# Patient Record
Sex: MALE | Race: WHITE | Employment: OTHER | URBAN - METROPOLITAN AREA
[De-identification: names, ages, dates, MRNs, and addresses within clinical notes are randomized per-mention and may not be internally consistent; named-entity substitution may affect disease eponyms.]

---

## 2017-12-02 ENCOUNTER — HOSPITAL ENCOUNTER (OUTPATIENT)
Age: 62
Setting detail: OBSERVATION
Discharge: HOME | End: 2017-12-03
Attending: EMERGENCY MEDICINE | Admitting: SURGERY
Payer: COMMERCIAL

## 2017-12-02 ENCOUNTER — APPOINTMENT (OUTPATIENT)
Dept: GENERAL RADIOLOGY | Age: 62
End: 2017-12-02
Payer: COMMERCIAL

## 2017-12-02 ENCOUNTER — APPOINTMENT (OUTPATIENT)
Dept: CT IMAGING | Age: 62
End: 2017-12-02
Payer: COMMERCIAL

## 2017-12-02 DIAGNOSIS — S01.81XA FACIAL LACERATION, INITIAL ENCOUNTER: ICD-10-CM

## 2017-12-02 DIAGNOSIS — F10.920 ACUTE ALCOHOLIC INTOXICATION WITHOUT COMPLICATION (HCC): Primary | ICD-10-CM

## 2017-12-02 PROBLEM — F10.220 ALCOHOL INTOXICATION IN ALCOHOLISM WITH BLOOD LEVEL OVER 0.3 WITHOUT COMPLICATION (HCC): Status: ACTIVE | Noted: 2017-12-02

## 2017-12-02 LAB
ETHANOL PERCENT: 0.34 %
ETHANOL: 344 MG/DL

## 2017-12-02 PROCEDURE — 71020 XR CHEST STANDARD TWO VW: CPT

## 2017-12-02 PROCEDURE — 99285 EMERGENCY DEPT VISIT HI MDM: CPT

## 2017-12-02 PROCEDURE — 12011 RPR F/E/E/N/L/M 2.5 CM/<: CPT

## 2017-12-02 PROCEDURE — G0378 HOSPITAL OBSERVATION PER HR: HCPCS

## 2017-12-02 PROCEDURE — 72125 CT NECK SPINE W/O DYE: CPT

## 2017-12-02 PROCEDURE — G0480 DRUG TEST DEF 1-7 CLASSES: HCPCS

## 2017-12-02 PROCEDURE — 36415 COLL VENOUS BLD VENIPUNCTURE: CPT

## 2017-12-02 PROCEDURE — 90471 IMMUNIZATION ADMIN: CPT | Performed by: EMERGENCY MEDICINE

## 2017-12-02 PROCEDURE — 90715 TDAP VACCINE 7 YRS/> IM: CPT | Performed by: EMERGENCY MEDICINE

## 2017-12-02 PROCEDURE — 2500000003 HC RX 250 WO HCPCS: Performed by: EMERGENCY MEDICINE

## 2017-12-02 PROCEDURE — 70450 CT HEAD/BRAIN W/O DYE: CPT

## 2017-12-02 PROCEDURE — 70486 CT MAXILLOFACIAL W/O DYE: CPT

## 2017-12-02 PROCEDURE — 6360000002 HC RX W HCPCS: Performed by: EMERGENCY MEDICINE

## 2017-12-02 RX ORDER — SODIUM CHLORIDE 0.9 % (FLUSH) 0.9 %
10 SYRINGE (ML) INJECTION EVERY 12 HOURS SCHEDULED
Status: DISCONTINUED | OUTPATIENT
Start: 2017-12-02 | End: 2017-12-03 | Stop reason: HOSPADM

## 2017-12-02 RX ORDER — METOPROLOL SUCCINATE 50 MG/1
200 TABLET, EXTENDED RELEASE ORAL DAILY
COMMUNITY

## 2017-12-02 RX ORDER — ACETAMINOPHEN 325 MG/1
650 TABLET ORAL EVERY 4 HOURS PRN
Status: DISCONTINUED | OUTPATIENT
Start: 2017-12-02 | End: 2017-12-03 | Stop reason: HOSPADM

## 2017-12-02 RX ORDER — LIDOCAINE HYDROCHLORIDE 10 MG/ML
20 INJECTION, SOLUTION INFILTRATION; PERINEURAL ONCE
Status: COMPLETED | OUTPATIENT
Start: 2017-12-02 | End: 2017-12-02

## 2017-12-02 RX ORDER — SODIUM CHLORIDE 0.9 % (FLUSH) 0.9 %
10 SYRINGE (ML) INJECTION PRN
Status: DISCONTINUED | OUTPATIENT
Start: 2017-12-02 | End: 2017-12-03 | Stop reason: HOSPADM

## 2017-12-02 RX ADMIN — TETANUS TOXOID, REDUCED DIPHTHERIA TOXOID AND ACELLULAR PERTUSSIS VACCINE, ADSORBED 0.5 ML: 5; 2.5; 8; 8; 2.5 SUSPENSION INTRAMUSCULAR at 20:53

## 2017-12-02 RX ADMIN — LIDOCAINE HYDROCHLORIDE 20 ML: 10 INJECTION, SOLUTION INFILTRATION; PERINEURAL at 19:42

## 2017-12-02 ASSESSMENT — ENCOUNTER SYMPTOMS
ABDOMINAL PAIN: 0
COUGH: 0
NAUSEA: 0
SHORTNESS OF BREATH: 0
SORE THROAT: 0
VOMITING: 0

## 2017-12-02 ASSESSMENT — PAIN SCALES - GENERAL
PAINLEVEL_OUTOF10: 6
PAINLEVEL_OUTOF10: 0

## 2017-12-02 NOTE — ED PROVIDER NOTES
[facility]  eMERGENCY dEPARTMENT eNCOUnter   Attending Attestation     Pt Name: Flavio Ngo  MRN: [id number]  [place]frhonda 1955  Date of evaluation: 12/2/17       Flavio Ngo is a 65 y.o. male who presents with Fall (head lac)      History:   Trip and fall, prior to arrival striking his head, complains of intermittent episodes of generalized weakness, not lose consciousness. Only complaining of laceration to the forehead, denies any neck or back pain. Social History     Tobacco History     Smoking Status  Never Smoker    Smokeless Tobacco Use  Never Used          Alcohol History     Alcohol Use Status  Yes Drinks/Week  6 Cans of beer per week Amount  3.6 oz alcohol/wk Comment  pt states he drinks daily          Drug Use     Drug Use Status  No          Sexual Activity     Sexually Active  Not Asked                No current facility-administered medications for this encounter. Current Outpatient Prescriptions   Medication Sig Dispense Refill    metoprolol succinate (TOPROL XL) 50 MG extended release tablet Take 200 mg by mouth daily          Past Medical History:   Diagnosis Date    Hypertension        History reviewed. No pertinent surgical history. Exam: Vitals:   Vitals:    12/02/17 2031 12/02/17 2112 12/03/17 0046 12/03/17 0711   BP: (!) 160/92 (!) 182/83 133/84 (!) 156/82   Pulse: 57 57 61 71   Resp: 18 16 16 20   Temp: 98.3 °F (36.8 °C) 97.9 °F (36.6 °C) 98.4 °F (36.9 °C) 99.1 °F (37.3 °C)   TempSrc: Oral Oral Oral Oral   SpO2: 96% 98% 95% 96%   Weight:       Height:         Plan:  CT head neck, chest x-ray. There is some alcohol use although says is much earlier this morning, but there is concern for alcoholism based on frequent falls, per wife patient is acting at baseline. CT scan of face neck cervical spine are nondiagnostic, no evidence of fracture. Chest x-ray shows no acute process.     Alcohol level at 344, decision to admit in due to mechanism and age, recommend admission for

## 2017-12-02 NOTE — ED PROVIDER NOTES
Start Date End Date Taking? Authorizing Provider   cephALEXin (KEFLEX) 500 MG capsule Take 1 capsule by mouth 4 times daily for 7 days 12/3/17 12/10/17 Yes Terrell Ryan MD   metoprolol succinate (TOPROL XL) 50 MG extended release tablet Take 200 mg by mouth daily    Yes Historical Provider, MD       REVIEW OF SYSTEMS    (2-9 systems for level 4, 10 or more for level 5)      Review of Systems   Constitutional: Negative for chills and fever. HENT: Negative for sore throat. Eyes: Negative for visual disturbance. Respiratory: Negative for cough and shortness of breath. Cardiovascular: Negative for chest pain. Gastrointestinal: Negative for abdominal pain, nausea and vomiting. Genitourinary: Negative for difficulty urinating. Musculoskeletal: Negative for arthralgias and myalgias. Skin: Positive for wound. Neurological: Negative for weakness, numbness and headaches. Psychiatric/Behavioral: Negative for behavioral problems. PHYSICAL EXAM   (up to 7 for level 4, 8 or more for level 5)      INITIAL VITALS:   BP (!) 156/82   Pulse 71   Temp 99.1 °F (37.3 °C) (Oral)   Resp 20   Ht 5' 8\" (1.727 m)   Wt 180 lb (81.6 kg)   SpO2 96%   BMI 27.37 kg/m²     Physical Exam   Constitutional: He is oriented to person, place, and time. He appears well-developed and well-nourished. No distress. HENT:   Head: Normocephalic. Mouth/Throat: Oropharynx is clear and moist. No oropharyngeal exudate. Eyes: EOM are normal. Pupils are equal, round, and reactive to light. Neck: Normal range of motion. No midline tenderness palpation of the C-spine; no step-offs or deformities   Cardiovascular: Normal rate and regular rhythm. Pulmonary/Chest: Effort normal. He has no wheezes. He has no rales. Abdominal: Soft. There is no tenderness. There is no rebound and no guarding. Musculoskeletal: Normal range of motion.    No midline tenderness palpation of the thoracic or lumbar spine; no step-offs or deformities felt; 5 out of 5 strength of upper and lower extremities; no gross sensory deficits; strong distal pulses with good cap refill   Neurological: He is alert and oriented to person, place, and time. He has normal strength. No cranial nerve deficit or sensory deficit. GCS eye subscore is 4. GCS verbal subscore is 5. GCS motor subscore is 6. Skin:   Soft tissue swelling with surrounding ecchymosis above the left eyebrow with associated 2 cm linear laceration   Psychiatric: His behavior is normal.       DIFFERENTIAL  DIAGNOSIS     PLAN (LABS / IMAGING / EKG):  Orders Placed This Encounter   Procedures    CT Head WO Contrast    CT Facial Bones WO Contrast    CT CERVICAL SPINE WO CONTRAST    XR CHEST STANDARD (2 VW)    Ethanol    Cervical collar    Notify physician    Inpatient consult to Trauma Surgery    Inpatient consult to Trauma Surgery    Insert peripheral IV    PATIENT STATUS (DIRECT) Observation    PATIENT STATUS (FROM ED OR OR/PROCEDURAL) Observation    Discharge patient       MEDICATIONS ORDERED:  Orders Placed This Encounter   Medications    lidocaine 1 % injection 20 mL    DISCONTD: sodium chloride flush 0.9 % injection 10 mL    DISCONTD: sodium chloride flush 0.9 % injection 10 mL    DISCONTD: acetaminophen (TYLENOL) tablet 650 mg    Tetanus-Diphth-Acell Pertussis (BOOSTRIX) injection 0.5 mL    cephALEXin (KEFLEX) 500 MG capsule     Sig: Take 1 capsule by mouth 4 times daily for 7 days     Dispense:  28 capsule     Refill:  0       DIAGNOSTIC RESULTS / EMERGENCY DEPARTMENT COURSE / MDM     LABS:  Results for orders placed or performed during the hospital encounter of 12/02/17   Ethanol   Result Value Ref Range    Ethanol 344 (HH) <10 mg/dL    Ethanol percent 0.344 %       IMPRESSION: Patient presents with mechanical fall. States that he landed on his hands and face. He does appear clinically intoxicated even though he states that he only had 'a few drinks' this morning.  Denies any loss of consciousness. Patient is afebrile and hemodynamically stable. He is healthy and nontoxic in appearance. He is neurologically intact. He has no gross motor or sensory deficits. Cranial nerves are intact. He does have some soft tissue swelling to the left eyebrow with surrounding ecchymosis as well as a 2 centimeter laceration that is well approximated. Given his presentation and his age, we'll plan to obtain a CT of his head, neck and face to rule out any acute osseous injury. We'll plan to irrigate his wound and close it with sutures as well. RADIOLOGY:  Xr Chest Standard (2 Vw)    Result Date: 12/2/2017  EXAMINATION: TWO VIEWS OF THE CHEST 12/2/2017 6:31 pm COMPARISON: None. HISTORY: ORDERING SYSTEM PROVIDED HISTORY: fall, mechanism TECHNOLOGIST PROVIDED HISTORY: Reason for exam:->fall, mechanism Ordering Physician Provided Reason for Exam: fall Acuity: Acute Type of Exam: Initial FINDINGS: Cardiomediastinal contour is within normal limits. No focal consolidation. No significant pleural effusion. 1. No acute cardiopulmonary disease. Ct Head Wo Contrast    Result Date: 12/2/2017  EXAMINATION: CT OF THE HEAD WITHOUT CONTRAST  12/2/2017 6:12 pm TECHNIQUE: CT of the head was performed without the administration of intravenous contrast. Dose modulation, iterative reconstruction, and/or weight based adjustment of the mA/kV was utilized to reduce the radiation dose to as low as reasonably achievable. COMPARISON: None. HISTORY: ORDERING SYSTEM PROVIDED HISTORY: fall TECHNOLOGIST PROVIDED HISTORY: Ordering Physician Provided Reason for Exam: PATIENT STATES HE FELL TODAY, LACERATION TO LEFT EYE Acuity: Acute Type of Exam: Initial FINDINGS: BRAIN/VENTRICLES: There is prominence of the ventricles and cortical sulci representing cerebral atrophy. No midline shift. Basal cisterns are normally outlined. There is no evidence for acute infarct. No acute intra or extra-axial hemorrhage.  ORBITS: The 12/2/2017  EXAMINATION: CT OF THE CERVICAL SPINE WITHOUT CONTRAST 12/2/2017 6:12 pm TECHNIQUE: CT of the cervical spine was performed without the administration of intravenous contrast. Multiplanar reformatted images are provided for review. Dose modulation, iterative reconstruction, and/or weight based adjustment of the mA/kV was utilized to reduce the radiation dose to as low as reasonably achievable. COMPARISON: None. HISTORY: ORDERING SYSTEM PROVIDED HISTORY: fall TECHNOLOGIST PROVIDED HISTORY: Ordering Physician Provided Reason for Exam: PATIENT STATES HE FELL TODAY, LACERATION TO LEFT EYE Acuity: Acute Type of Exam: Initial FINDINGS: BONES/ALIGNMENT: No subluxation or dislocation. Straightening of the cervical spine may be due to muscle spasm or positioning. No acute fracture. DEGENERATIVE CHANGES: Multilevel mild disc space narrowing with vertebral body osteophytes. SOFT TISSUES: There is no prevertebral soft tissue swelling. No acute abnormality of the cervical spine. Mild degenerative changes. EKG  None    All EKG's are interpreted by the Emergency Department Physician who either signs or Co-signs this chart in the absence of a cardiologist.    EMERGENCY DEPARTMENT COURSE:  Patient was updated on lab results and imaging studies. Discussed that his alcohol level was over 300. When this value was seen, a cervical collar was placed around the patient's neck. Discussed that his imaging studies were negative for any acute osseous injury. Discussed that given his presentation, we would like to have him admitted to the trauma service for reevaluation at a sober time make sure he doesn't have any cervical injury. Patient is agreeable with the plan and stable for admission. Awaiting transfer to the floor. Patient tolerated laceration repair without difficulty. PROCEDURES:  Laceration Repair Procedure Note    Indication: Laceration    Procedure:  The patient was placed in the appropriate position and

## 2017-12-03 VITALS
HEART RATE: 71 BPM | WEIGHT: 180 LBS | HEIGHT: 68 IN | DIASTOLIC BLOOD PRESSURE: 82 MMHG | RESPIRATION RATE: 20 BRPM | TEMPERATURE: 99.1 F | OXYGEN SATURATION: 96 % | BODY MASS INDEX: 27.28 KG/M2 | SYSTOLIC BLOOD PRESSURE: 156 MMHG
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
## 2017-12-03 PROCEDURE — 2580000003 HC RX 258: Performed by: SURGERY

## 2017-12-03 PROCEDURE — G0378 HOSPITAL OBSERVATION PER HR: HCPCS

## 2017-12-03 RX ORDER — CEPHALEXIN 500 MG/1
500 CAPSULE ORAL 4 TIMES DAILY
Qty: 28 CAPSULE | Refills: 0 | Status: SHIPPED | OUTPATIENT
Start: 2017-12-03 | End: 2017-12-10

## 2017-12-03 RX ADMIN — Medication 10 ML: at 08:22

## 2017-12-03 NOTE — ED NOTES
Report called to floor, received by Sapna Butler. Report pt laceration on left eyebrow was cleaned and had 5 stitches put in and bacitracin and gauze applied. Abrasion on left cheek and right thumb cleaned and bacitracin applied. Report pt ETOH 344, aspen collar applied to pt. Pt used urinal and output was 400 mL. Pt family at bedside. RN verbalized understanding.      Laurie Condon RN  12/02/17 [id number]

## 2017-12-03 NOTE — H&P
General Surgery Consult      Pt Name: Flavia Zambrano  MRN: [id number]  YOB: 1955  Date of evaluation: 12/3/2017  Primary Care Physician: Derrick Sinclair   Patient evaluated at the request of  Dr. Donnell Veras  Reason for evaluation: History of fall    SUBJECTIVE:   History of Chief Complaint:    Flavia Zambrano is a 65 y.o. male who presents with History of fall patient without intoxication patient was walking and fell hitting left side of his face. Patient had a small laceration above the left eye that was sutured in the emergency room. Patient denied any loss of consciousness. When he was interviewed today patient denied any significant complaints. Was tolerating diet no chest pain no abdominal pain some swelling around the left thigh but invasion was not affected. Patient wanted to go home. He was able to ambulate without any difficulty. Patient had workup in the emergency room including CT scan of the head and neck and facial bones which was essentially negative. Some left periorbital swelling noted. Patient's I'll call level was quite high and he was admitted for overnight observation. . Symptom onset has been acute for a time period of few hour(s). Severity is described as mild-moderate. Course of his symptoms over time is acute. Past Medical History   has a past medical history of Hypertension. Past Surgical History   has no past surgical history on file. Medications  Prior to Admission medications    Medication Sig Start Date End Date Taking? Authorizing Provider   cephALEXin (KEFLEX) 500 MG capsule Take 1 capsule by mouth 4 times daily for 7 days 12/3/17 12/10/17 Yes Song Guy MD   metoprolol succinate (TOPROL XL) 50 MG extended release tablet Take 200 mg by mouth daily    Yes Historical Provider, MD     Allergies  has No Known Allergies. Family History  family history is not on file. Social History   reports that he has never smoked.  He has never used smokeless tobacco. He reports that he deficits. CN II-XII are grossly intact. EXTREMITIES: no cyanosis, no clubbing and no edema    LABS:   CBC with Differential:  No results found for: WBC, RBC, HGB, HCT, PLT, MCV, MCH, MCHC, RDW, NRBC, SEGSPCT, BANDSPCT, BLASTSPCT, METASPCT, LYMPHOPCT, PROMYELOPCT, MONOPCT, MYELOPCT, EOSPCT, BASOPCT, ATYLYMREL, MONOSABS, LYMPHSABS, EOSABS, BASOSABS, DIFFTYPE  BMP:  No results found for: NA, K, CL, CO2, BUN, LABALBU, CREATININE, CALCIUM, GFRAA, LABGLOM, GLUCOSE  Hepatic Function Panel:  No results found for: ALKPHOS, ALT, AST, PROT, BILITOT, BILIDIR, IBILI, LABALBU  Calcium:  No results found for: CALCIUM  Magnesium:  No results found for: MG  Phosphorus:  No results found for: PHOS  PT/INR:  No results found for: PROTIME, INR  ABG:  No results found for: PHART, PH, SMR7MVF, PCO2, PO2ART, PO2, NRL4HVZ, HCO3, BEART, BE, THGBART, THB, CBC4PDT, C4HJKEYQ, O2SAT  Urine Culture:  No components found for: CURINE  Blood Culture:  No components found for: CBLOOD, CFUNGUSBL  Stool Culture:  No components found for: CSTOOL    RADIOLOGY:   I have personally reviewed the following films:  Xr Chest Standard (2 Vw)    Result Date: 12/2/2017  EXAMINATION: TWO VIEWS OF THE CHEST 12/2/2017 6:31 pm COMPARISON: None. HISTORY: ORDERING SYSTEM PROVIDED HISTORY: fall, mechanism TECHNOLOGIST PROVIDED HISTORY: Reason for exam:->fall, mechanism Ordering Physician Provided Reason for Exam: fall Acuity: Acute Type of Exam: Initial FINDINGS: Cardiomediastinal contour is within normal limits. No focal consolidation. No significant pleural effusion. 1. No acute cardiopulmonary disease.      Ct Head Wo Contrast    Result Date: 12/2/2017  EXAMINATION: CT OF THE HEAD WITHOUT CONTRAST  12/2/2017 6:12 pm TECHNIQUE: CT of the head was performed without the administration of intravenous contrast. Dose modulation, iterative reconstruction, and/or weight based adjustment of the mA/kV was utilized to reduce the radiation dose to as low as reasonably tissue swelling. SINUSES/MASTOIDS:  Quite prominent mucosal thickening of the maxillary sinuses left greater than right reflective of chronic sinus inflammatory disease. Mastoid air cells are well aerated. No acute fracture is seen. SOFT TISSUES:  Mild left periorbital soft tissue swelling. No acute traumatic injury of the facial bones. Mild left periorbital soft tissue swelling. Chronic bilateral maxillary sinus inflammatory disease. Ct Cervical Spine Wo Contrast    Result Date: 12/2/2017  EXAMINATION: CT OF THE CERVICAL SPINE WITHOUT CONTRAST 12/2/2017 6:12 pm TECHNIQUE: CT of the cervical spine was performed without the administration of intravenous contrast. Multiplanar reformatted images are provided for review. Dose modulation, iterative reconstruction, and/or weight based adjustment of the mA/kV was utilized to reduce the radiation dose to as low as reasonably achievable. COMPARISON: None. HISTORY: ORDERING SYSTEM PROVIDED HISTORY: fall TECHNOLOGIST PROVIDED HISTORY: Ordering Physician Provided Reason for Exam: PATIENT STATES HE FELL TODAY, LACERATION TO LEFT EYE Acuity: Acute Type of Exam: Initial FINDINGS: BONES/ALIGNMENT: No subluxation or dislocation. Straightening of the cervical spine may be due to muscle spasm or positioning. No acute fracture. DEGENERATIVE CHANGES: Multilevel mild disc space narrowing with vertebral body osteophytes. SOFT TISSUES: There is no prevertebral soft tissue swelling. No acute abnormality of the cervical spine. Mild degenerative changes. IMPRESSION:   1. History of EtOH intoxication with history of fall. Small laceration above the left eyebrow with some left periorbital swelling. No evidence of acute fracture. Negative CT scan of the head and neck and the facial bones. does not have any pertinent problems on file. PLAN:   1. No acute general surgical issues. C-collar was discontinued.  No evidence of radiologic or clinical evidence of C-spine trauma. patient will be discharged home today. Patient will follow-up with his family physician towards the end of this coming week and those sutures can be removed and his family [de-identified] office. Follow-up with me as needed. Thank you for this interesting consult and for allowing us to participate in the care of this patient. If you have any questions please don't hesitate to call.           Electronically signed by Kay Qiunones MD  on 12/3/2017 at 6:36 PM
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
## 2022-01-01 ENCOUNTER — APPOINTMENT (OUTPATIENT)
Dept: GENERAL RADIOLOGY | Age: 67
DRG: 377 | End: 2022-01-01
Payer: COMMERCIAL

## 2022-01-01 ENCOUNTER — HOSPITAL ENCOUNTER (INPATIENT)
Age: 67
LOS: 4 days | DRG: 377 | End: 2022-04-01
Attending: EMERGENCY MEDICINE
Payer: COMMERCIAL

## 2022-01-01 ENCOUNTER — APPOINTMENT (OUTPATIENT)
Dept: ULTRASOUND IMAGING | Age: 67
DRG: 377 | End: 2022-01-01
Payer: COMMERCIAL

## 2022-01-01 ENCOUNTER — APPOINTMENT (OUTPATIENT)
Dept: MRI IMAGING | Age: 67
DRG: 377 | End: 2022-01-01
Payer: COMMERCIAL

## 2022-01-01 ENCOUNTER — APPOINTMENT (OUTPATIENT)
Dept: CT IMAGING | Age: 67
DRG: 377 | End: 2022-01-01
Payer: COMMERCIAL

## 2022-01-01 VITALS
TEMPERATURE: 97.5 F | RESPIRATION RATE: 34 BRPM | SYSTOLIC BLOOD PRESSURE: 88 MMHG | HEIGHT: 69 IN | OXYGEN SATURATION: 79 % | BODY MASS INDEX: 38.04 KG/M2 | WEIGHT: 256.84 LBS | DIASTOLIC BLOOD PRESSURE: 54 MMHG | HEART RATE: 128 BPM

## 2022-01-01 DIAGNOSIS — I46.9 CARDIAC ARREST (HCC): Primary | ICD-10-CM

## 2022-01-01 LAB
-: ABNORMAL
ABO/RH: NORMAL
ABSOLUTE EOS #: 0 K/UL (ref 0–0.4)
ABSOLUTE EOS #: 0.13 K/UL (ref 0–0.4)
ABSOLUTE EOS #: 0.19 K/UL (ref 0–0.4)
ABSOLUTE EOS #: <0.03 K/UL (ref 0–0.44)
ABSOLUTE IMMATURE GRANULOCYTE: 0 K/UL (ref 0–0.3)
ABSOLUTE IMMATURE GRANULOCYTE: 0 K/UL (ref 0–0.3)
ABSOLUTE IMMATURE GRANULOCYTE: 0.04 K/UL (ref 0–0.3)
ABSOLUTE IMMATURE GRANULOCYTE: 0.51 K/UL (ref 0–0.3)
ABSOLUTE LYMPH #: 1.03 K/UL (ref 1.1–3.7)
ABSOLUTE LYMPH #: 1.41 K/UL (ref 1–4.8)
ABSOLUTE LYMPH #: 1.51 K/UL (ref 1–4.8)
ABSOLUTE LYMPH #: 4.48 K/UL (ref 1–4.8)
ABSOLUTE MONO #: 0.86 K/UL (ref 0.1–1.2)
ABSOLUTE MONO #: 0.9 K/UL (ref 0.1–0.8)
ABSOLUTE MONO #: 1.03 K/UL (ref 0.1–0.8)
ABSOLUTE MONO #: 1.06 K/UL (ref 0.1–0.8)
ACETAMINOPHEN LEVEL: <5 UG/ML (ref 10–30)
ALBUMIN FLUID: 0.8 G/DL
ALBUMIN SERPL-MCNC: 2.1 G/DL (ref 3.5–5.2)
ALBUMIN SERPL-MCNC: 2.3 G/DL (ref 3.5–5.2)
ALBUMIN SERPL-MCNC: 2.5 G/DL (ref 3.5–5.2)
ALBUMIN SERPL-MCNC: 2.5 G/DL (ref 3.5–5.2)
ALBUMIN SERPL-MCNC: 2.6 G/DL (ref 3.5–5.2)
ALBUMIN/GLOBULIN RATIO: 0.7 (ref 1–2.5)
ALBUMIN/GLOBULIN RATIO: 0.8 (ref 1–2.5)
ALLEN TEST: ABNORMAL
ALP BLD-CCNC: 101 U/L (ref 40–129)
ALP BLD-CCNC: 58 U/L (ref 40–129)
ALP BLD-CCNC: 63 U/L (ref 40–129)
ALP BLD-CCNC: 82 U/L (ref 40–129)
ALP BLD-CCNC: 99 U/L (ref 40–129)
ALT SERPL-CCNC: 16 U/L (ref 5–41)
ALT SERPL-CCNC: 33 U/L (ref 5–41)
ALT SERPL-CCNC: 35 U/L (ref 5–41)
ALT SERPL-CCNC: 37 U/L (ref 5–41)
ALT SERPL-CCNC: 41 U/L (ref 5–41)
AMMONIA: 64 UMOL/L (ref 16–60)
AMMONIA: 65 UMOL/L (ref 16–60)
ANION GAP SERPL CALCULATED.3IONS-SCNC: 10 MMOL/L (ref 9–17)
ANION GAP SERPL CALCULATED.3IONS-SCNC: 13 MMOL/L (ref 9–17)
ANION GAP SERPL CALCULATED.3IONS-SCNC: 13 MMOL/L (ref 9–17)
ANION GAP SERPL CALCULATED.3IONS-SCNC: 14 MMOL/L (ref 9–17)
ANION GAP SERPL CALCULATED.3IONS-SCNC: 16 MMOL/L (ref 9–17)
ANION GAP SERPL CALCULATED.3IONS-SCNC: 16 MMOL/L (ref 9–17)
ANION GAP SERPL CALCULATED.3IONS-SCNC: 19 MMOL/L (ref 9–17)
ANION GAP SERPL CALCULATED.3IONS-SCNC: 20 MMOL/L (ref 9–17)
ANION GAP: 17 MMOL/L (ref 7–16)
ANION GAP: 21 MMOL/L (ref 7–16)
ANTIBODY SCREEN: NEGATIVE
ARM BAND NUMBER: NORMAL
AST SERPL-CCNC: 38 U/L
AST SERPL-CCNC: 81 U/L
AST SERPL-CCNC: 85 U/L
AST SERPL-CCNC: 88 U/L
AST SERPL-CCNC: 92 U/L
BASOPHILS # BLD: 0 % (ref 0–2)
BASOPHILS # BLD: 1 % (ref 0–2)
BASOPHILS ABSOLUTE: 0 K/UL (ref 0–0.2)
BASOPHILS ABSOLUTE: 0 K/UL (ref 0–0.2)
BASOPHILS ABSOLUTE: 0.13 K/UL (ref 0–0.2)
BASOPHILS ABSOLUTE: <0.03 K/UL (ref 0–0.2)
BILIRUB SERPL-MCNC: 0.89 MG/DL (ref 0.3–1.2)
BILIRUB SERPL-MCNC: 1.34 MG/DL (ref 0.3–1.2)
BILIRUB SERPL-MCNC: 1.47 MG/DL (ref 0.3–1.2)
BILIRUB SERPL-MCNC: 1.53 MG/DL (ref 0.3–1.2)
BILIRUB SERPL-MCNC: 2.08 MG/DL (ref 0.3–1.2)
BILIRUBIN DIRECT: 0.78 MG/DL
BILIRUBIN DIRECT: 0.87 MG/DL
BILIRUBIN DIRECT: 0.89 MG/DL
BILIRUBIN DIRECT: 0.93 MG/DL
BILIRUBIN URINE: NEGATIVE
BILIRUBIN, INDIRECT: 0.47 MG/DL (ref 0–1)
BILIRUBIN, INDIRECT: 0.58 MG/DL (ref 0–1)
BILIRUBIN, INDIRECT: 0.6 MG/DL (ref 0–1)
BILIRUBIN, INDIRECT: 1.3 MG/DL (ref 0–1)
BLD PROD TYP BPU: NORMAL
BLOOD BANK BLOOD PRODUCT EXPIRATION DATE: NORMAL
BLOOD BANK ISBT PRODUCT BLOOD TYPE: 5100
BLOOD BANK ISBT PRODUCT BLOOD TYPE: 5100
BLOOD BANK ISBT PRODUCT BLOOD TYPE: 9500
BLOOD BANK ISBT PRODUCT BLOOD TYPE: 9500
BLOOD BANK UNIT TYPE AND RH: NORMAL
BPU ID: NORMAL
BUN BLDV-MCNC: 12 MG/DL (ref 8–23)
BUN BLDV-MCNC: 13 MG/DL (ref 8–23)
BUN BLDV-MCNC: 15 MG/DL (ref 8–23)
BUN BLDV-MCNC: 18 MG/DL (ref 8–23)
BUN BLDV-MCNC: 23 MG/DL (ref 8–23)
BUN BLDV-MCNC: 29 MG/DL (ref 8–23)
BUN BLDV-MCNC: 33 MG/DL (ref 8–23)
BUN BLDV-MCNC: 34 MG/DL (ref 8–23)
CALCIUM IONIZED: 0.98 MMOL/L (ref 1.13–1.33)
CALCIUM IONIZED: 0.99 MMOL/L (ref 1.13–1.33)
CALCIUM IONIZED: 1.01 MMOL/L (ref 1.13–1.33)
CALCIUM IONIZED: 1.05 MMOL/L (ref 1.13–1.33)
CALCIUM SERPL-MCNC: 7 MG/DL (ref 8.6–10.4)
CALCIUM SERPL-MCNC: 7.2 MG/DL (ref 8.6–10.4)
CALCIUM SERPL-MCNC: 7.3 MG/DL (ref 8.6–10.4)
CALCIUM SERPL-MCNC: 7.3 MG/DL (ref 8.6–10.4)
CALCIUM SERPL-MCNC: 7.5 MG/DL (ref 8.6–10.4)
CALCIUM SERPL-MCNC: 8 MG/DL (ref 8.6–10.4)
CARBOXYHEMOGLOBIN: 2.3 % (ref 0–5)
CASE NUMBER:: NORMAL
CASTS UA: ABNORMAL /LPF (ref 0–8)
CHLORIDE BLD-SCNC: 100 MMOL/L (ref 98–107)
CHLORIDE BLD-SCNC: 101 MMOL/L (ref 98–107)
CHLORIDE BLD-SCNC: 103 MMOL/L (ref 98–107)
CHLORIDE BLD-SCNC: 104 MMOL/L (ref 98–107)
CHLORIDE BLD-SCNC: 104 MMOL/L (ref 98–107)
CHLORIDE BLD-SCNC: 105 MMOL/L (ref 98–107)
CHLORIDE BLD-SCNC: 108 MMOL/L (ref 98–107)
CHLORIDE BLD-SCNC: 99 MMOL/L (ref 98–107)
CO2: 10 MMOL/L (ref 20–31)
CO2: 12 MMOL/L (ref 20–31)
CO2: 14 MMOL/L (ref 20–31)
CO2: 18 MMOL/L (ref 20–31)
CO2: 22 MMOL/L (ref 20–31)
CO2: 24 MMOL/L (ref 20–31)
COLOR: ABNORMAL
CREAT SERPL-MCNC: 0.96 MG/DL (ref 0.7–1.2)
CREAT SERPL-MCNC: 1.12 MG/DL (ref 0.7–1.2)
CREAT SERPL-MCNC: 1.62 MG/DL (ref 0.7–1.2)
CREAT SERPL-MCNC: 2.08 MG/DL (ref 0.7–1.2)
CREAT SERPL-MCNC: 2.72 MG/DL (ref 0.7–1.2)
CREAT SERPL-MCNC: 3.23 MG/DL (ref 0.7–1.2)
CREAT SERPL-MCNC: 3.96 MG/DL (ref 0.7–1.2)
CREAT SERPL-MCNC: 4.09 MG/DL (ref 0.7–1.2)
CROSSMATCH RESULT: NORMAL
CULTURE: ABNORMAL
CULTURE: ABNORMAL
DIRECT EXAM: ABNORMAL
DISPENSE STATUS BLOOD BANK: NORMAL
EKG ATRIAL RATE: 64 BPM
EKG P AXIS: 17 DEGREES
EKG P-R INTERVAL: 176 MS
EKG Q-T INTERVAL: 452 MS
EKG QRS DURATION: 98 MS
EKG QTC CALCULATION (BAZETT): 466 MS
EKG R AXIS: -19 DEGREES
EKG T AXIS: 96 DEGREES
EKG VENTRICULAR RATE: 64 BPM
EOSINOPHILS RELATIVE PERCENT: 0 % (ref 1–4)
EOSINOPHILS RELATIVE PERCENT: 0 % (ref 1–4)
EOSINOPHILS RELATIVE PERCENT: 1 % (ref 1–4)
EOSINOPHILS RELATIVE PERCENT: 2 % (ref 1–4)
EPITHELIAL CELLS UA: ABNORMAL /HPF (ref 0–5)
ETHANOL PERCENT: <0.01 %
ETHANOL: <10 MG/DL
EXPIRATION DATE: NORMAL
FIO2: 100
FIO2: 100
FIO2: 45
FIO2: 50
FIO2: 60
FIO2: ABNORMAL
GFR AFRICAN AMERICAN: 18 ML/MIN
GFR AFRICAN AMERICAN: 19 ML/MIN
GFR AFRICAN AMERICAN: 23 ML/MIN
GFR AFRICAN AMERICAN: 29 ML/MIN
GFR AFRICAN AMERICAN: 39 ML/MIN
GFR AFRICAN AMERICAN: 52 ML/MIN
GFR AFRICAN AMERICAN: >60 ML/MIN
GFR AFRICAN AMERICAN: >60 ML/MIN
GFR NON-AFRICAN AMERICAN: 15 ML/MIN
GFR NON-AFRICAN AMERICAN: 15 ML/MIN
GFR NON-AFRICAN AMERICAN: 19 ML/MIN
GFR NON-AFRICAN AMERICAN: 24 ML/MIN
GFR NON-AFRICAN AMERICAN: 32 ML/MIN
GFR NON-AFRICAN AMERICAN: 43 ML/MIN
GFR NON-AFRICAN AMERICAN: 59 ML/MIN
GFR NON-AFRICAN AMERICAN: >60 ML/MIN
GFR SERPL CREATININE-BSD FRML MDRD: >60 ML/MIN
GFR SERPL CREATININE-BSD FRML MDRD: >60 ML/MIN
GFR SERPL CREATININE-BSD FRML MDRD: ABNORMAL ML/MIN/{1.73_M2}
GFR SERPL CREATININE-BSD FRML MDRD: NORMAL ML/MIN/{1.73_M2}
GLUCOSE BLD-MCNC: 100 MG/DL (ref 75–110)
GLUCOSE BLD-MCNC: 103 MG/DL (ref 75–110)
GLUCOSE BLD-MCNC: 106 MG/DL (ref 75–110)
GLUCOSE BLD-MCNC: 107 MG/DL (ref 74–100)
GLUCOSE BLD-MCNC: 110 MG/DL (ref 70–99)
GLUCOSE BLD-MCNC: 113 MG/DL (ref 75–110)
GLUCOSE BLD-MCNC: 116 MG/DL (ref 75–110)
GLUCOSE BLD-MCNC: 117 MG/DL (ref 70–99)
GLUCOSE BLD-MCNC: 119 MG/DL (ref 75–110)
GLUCOSE BLD-MCNC: 124 MG/DL (ref 75–110)
GLUCOSE BLD-MCNC: 127 MG/DL (ref 75–110)
GLUCOSE BLD-MCNC: 129 MG/DL (ref 70–99)
GLUCOSE BLD-MCNC: 130 MG/DL (ref 70–99)
GLUCOSE BLD-MCNC: 130 MG/DL (ref 74–100)
GLUCOSE BLD-MCNC: 131 MG/DL (ref 74–100)
GLUCOSE BLD-MCNC: 133 MG/DL (ref 75–110)
GLUCOSE BLD-MCNC: 143 MG/DL (ref 74–100)
GLUCOSE BLD-MCNC: 151 MG/DL (ref 70–99)
GLUCOSE BLD-MCNC: 174 MG/DL (ref 75–110)
GLUCOSE BLD-MCNC: 180 MG/DL (ref 75–110)
GLUCOSE BLD-MCNC: 195 MG/DL (ref 70–99)
GLUCOSE BLD-MCNC: 200 MG/DL (ref 74–100)
GLUCOSE BLD-MCNC: 208 MG/DL (ref 75–110)
GLUCOSE BLD-MCNC: 221 MG/DL (ref 70–99)
GLUCOSE BLD-MCNC: 238 MG/DL (ref 74–100)
GLUCOSE BLD-MCNC: 82 MG/DL (ref 75–110)
GLUCOSE BLD-MCNC: 89 MG/DL (ref 75–110)
GLUCOSE BLD-MCNC: 93 MG/DL (ref 75–110)
GLUCOSE BLD-MCNC: 98 MG/DL (ref 70–99)
GLUCOSE URINE: NEGATIVE
HCO3 VENOUS: 12.9 MMOL/L (ref 24–30)
HCO3 VENOUS: 14.1 MMOL/L (ref 22–29)
HCT VFR BLD CALC: 20 % (ref 40.7–50.3)
HCT VFR BLD CALC: 20.4 % (ref 40.7–50.3)
HCT VFR BLD CALC: 22 % (ref 40.7–50.3)
HCT VFR BLD CALC: 22.1 % (ref 40.7–50.3)
HCT VFR BLD CALC: 22.2 % (ref 40.7–50.3)
HCT VFR BLD CALC: 22.9 % (ref 40.7–50.3)
HCT VFR BLD CALC: 23.3 % (ref 40.7–50.3)
HCT VFR BLD CALC: 23.6 % (ref 40.7–50.3)
HCT VFR BLD CALC: 24.9 % (ref 40.7–50.3)
HEMOGLOBIN: 5.6 G/DL (ref 13–17)
HEMOGLOBIN: 6.6 G/DL (ref 13–17)
HEMOGLOBIN: 6.9 G/DL (ref 13–17)
HEMOGLOBIN: 7.1 G/DL (ref 13–17)
HEMOGLOBIN: 7.3 G/DL (ref 13–17)
HEMOGLOBIN: 7.4 G/DL (ref 13–17)
HEMOGLOBIN: 7.6 G/DL (ref 13–17)
HEMOGLOBIN: 7.8 G/DL (ref 13–17)
HEMOGLOBIN: 8.3 G/DL (ref 13–17)
IMMATURE GRANULOCYTES: 0 %
IMMATURE GRANULOCYTES: 4 %
INR BLD: 1.6
INR BLD: 2
KETONES, URINE: NEGATIVE
LACTATE DEHYDROGENASE: 426 U/L (ref 135–225)
LACTIC ACID, WHOLE BLOOD: 11.2 MMOL/L (ref 0.7–2.1)
LACTIC ACID, WHOLE BLOOD: 11.4 MMOL/L (ref 0.7–2.1)
LACTIC ACID, WHOLE BLOOD: 2 MMOL/L (ref 0.7–2.1)
LACTIC ACID, WHOLE BLOOD: 2.2 MMOL/L (ref 0.7–2.1)
LACTIC ACID, WHOLE BLOOD: 2.3 MMOL/L (ref 0.7–2.1)
LACTIC ACID, WHOLE BLOOD: 3.7 MMOL/L (ref 0.7–2.1)
LACTIC ACID, WHOLE BLOOD: 5.3 MMOL/L (ref 0.7–2.1)
LEUKOCYTE ESTERASE, URINE: ABNORMAL
LV EF: 65 %
LVEF MODALITY: NORMAL
LYMPHOCYTES # BLD: 10 % (ref 24–43)
LYMPHOCYTES # BLD: 10 % (ref 24–44)
LYMPHOCYTES # BLD: 15 % (ref 24–44)
LYMPHOCYTES # BLD: 35 % (ref 24–44)
MAGNESIUM: 1.8 MG/DL (ref 1.6–2.6)
MAGNESIUM: 1.8 MG/DL (ref 1.6–2.6)
MAGNESIUM: 1.9 MG/DL (ref 1.6–2.6)
MAGNESIUM: 2.2 MG/DL (ref 1.6–2.6)
MCH RBC QN AUTO: 21.5 PG (ref 25.2–33.5)
MCH RBC QN AUTO: 24 PG (ref 25.2–33.5)
MCH RBC QN AUTO: 25.3 PG (ref 25.2–33.5)
MCH RBC QN AUTO: 25.8 PG (ref 25.2–33.5)
MCHC RBC AUTO-ENTMCNC: 28 G/DL (ref 28.4–34.8)
MCHC RBC AUTO-ENTMCNC: 32.3 G/DL (ref 28.4–34.8)
MCHC RBC AUTO-ENTMCNC: 33.2 G/DL (ref 28.4–34.8)
MCHC RBC AUTO-ENTMCNC: 33.8 G/DL (ref 28.4–34.8)
MCV RBC AUTO: 74.3 FL (ref 82.6–102.9)
MCV RBC AUTO: 74.7 FL (ref 82.6–102.9)
MCV RBC AUTO: 76.6 FL (ref 82.6–102.9)
MCV RBC AUTO: 77.6 FL (ref 82.6–102.9)
MODE: ABNORMAL
MONOCYTES # BLD: 11 % (ref 1–7)
MONOCYTES # BLD: 7 % (ref 1–7)
MONOCYTES # BLD: 7 % (ref 1–7)
MONOCYTES # BLD: 9 % (ref 3–12)
MORPHOLOGY: ABNORMAL
MRSA, DNA, NASAL: NEGATIVE
NEGATIVE BASE EXCESS, ART: 12 (ref 0–2)
NEGATIVE BASE EXCESS, ART: 13 (ref 0–2)
NEGATIVE BASE EXCESS, ART: 15 (ref 0–2)
NEGATIVE BASE EXCESS, ART: 3 (ref 0–2)
NEGATIVE BASE EXCESS, ART: 6 (ref 0–2)
NEGATIVE BASE EXCESS, VEN: 19 MMOL/L (ref 0–2)
NEGATIVE BASE EXCESS, VEN: 20 (ref 0–2)
NEURON SPEC ENOLASE: 18.7 NG/ML
NITRITE, URINE: NEGATIVE
NRBC AUTOMATED: 0 PER 100 WBC
NRBC AUTOMATED: 0 PER 100 WBC
NRBC AUTOMATED: 0.1 PER 100 WBC
NRBC AUTOMATED: 0.8 PER 100 WBC
NUCLEATED RED BLOOD CELLS: 1 PER 100 WBC
O2 DEVICE/FLOW/%: ABNORMAL
O2 SAT, VEN: 34 % (ref 60–85)
O2 SAT, VEN: 85.9 % (ref 60–85)
PARTIAL THROMBOPLASTIN TIME: 50.9 SEC (ref 20.5–30.5)
PARTIAL THROMBOPLASTIN TIME: 81.1 SEC (ref 20.5–30.5)
PATIENT TEMP: 34.5
PATIENT TEMP: 35
PATIENT TEMP: 35.4
PATIENT TEMP: 36.3
PATIENT TEMP: 37
PATIENT TEMP: 38
PATIENT TEMP: 38.3
PCO2, VEN: 78.2 (ref 39–55)
PCO2, VEN: 96.4 MM HG (ref 41–51)
PDW BLD-RTO: 17.9 % (ref 11.8–14.4)
PDW BLD-RTO: 18.6 % (ref 11.8–14.4)
PDW BLD-RTO: 19.9 % (ref 11.8–14.4)
PDW BLD-RTO: 21.2 % (ref 11.8–14.4)
PH UA: 5.5 (ref 5–8)
PH VENOUS: 6.77 (ref 7.32–7.43)
PH VENOUS: 6.85 (ref 7.32–7.42)
PHOSPHORUS: 3.7 MG/DL (ref 2.5–4.5)
PLATELET # BLD: 145 K/UL (ref 138–453)
PLATELET # BLD: 157 K/UL (ref 138–453)
PLATELET # BLD: 81 K/UL (ref 138–453)
PLATELET # BLD: 92 K/UL (ref 138–453)
PMV BLD AUTO: 10.3 FL (ref 8.1–13.5)
PMV BLD AUTO: 10.5 FL (ref 8.1–13.5)
PMV BLD AUTO: 10.7 FL (ref 8.1–13.5)
PMV BLD AUTO: 11.2 FL (ref 8.1–13.5)
PO2, VEN: 40.4 MM HG (ref 30–50)
PO2, VEN: 92.2 (ref 30–50)
POC BUN: 11 MG/DL (ref 8–26)
POC BUN: 12 MG/DL (ref 8–26)
POC CHLORIDE: 106 MMOL/L (ref 98–107)
POC CHLORIDE: 108 MMOL/L (ref 98–107)
POC CREATININE: 1.19 MG/DL (ref 0.51–1.19)
POC CREATININE: 1.22 MG/DL (ref 0.51–1.19)
POC HCO3: 12.5 MMOL/L (ref 21–28)
POC HCO3: 13.4 MMOL/L (ref 21–28)
POC HCO3: 13.7 MMOL/L (ref 21–28)
POC HCO3: 18.2 MMOL/L (ref 21–28)
POC HCO3: 21.3 MMOL/L (ref 21–28)
POC HCO3: 22.4 MMOL/L (ref 21–28)
POC HCO3: 25.5 MMOL/L (ref 21–28)
POC HEMATOCRIT: 21 % (ref 41–53)
POC HEMATOCRIT: 23 % (ref 41–53)
POC HEMOGLOBIN: 7.2 G/DL (ref 13.5–17.5)
POC HEMOGLOBIN: 7.7 G/DL (ref 13.5–17.5)
POC IONIZED CALCIUM: 1.14 MMOL/L (ref 1.15–1.33)
POC IONIZED CALCIUM: 1.22 MMOL/L (ref 1.15–1.33)
POC LACTIC ACID: 10.45 MMOL/L (ref 0.56–1.39)
POC LACTIC ACID: 10.97 MMOL/L (ref 0.56–1.39)
POC LACTIC ACID: 11.73 MMOL/L (ref 0.56–1.39)
POC LACTIC ACID: 6.1 MMOL/L (ref 0.56–1.39)
POC O2 SATURATION: 100 % (ref 94–98)
POC O2 SATURATION: 87 % (ref 94–98)
POC O2 SATURATION: 94 % (ref 94–98)
POC O2 SATURATION: 95 % (ref 94–98)
POC O2 SATURATION: 95 % (ref 94–98)
POC O2 SATURATION: 97 % (ref 94–98)
POC O2 SATURATION: 98 % (ref 94–98)
POC PCO2 TEMP: 23 MM HG
POC PCO2 TEMP: 25 MM HG
POC PCO2 TEMP: 25 MM HG
POC PCO2 TEMP: 30 MM HG
POC PCO2 TEMP: 35 MM HG
POC PCO2: 25.1 MM HG (ref 35–48)
POC PCO2: 26.6 MM HG (ref 35–48)
POC PCO2: 26.8 MM HG (ref 35–48)
POC PCO2: 28.9 MM HG (ref 35–48)
POC PCO2: 33.1 MM HG (ref 35–48)
POC PCO2: 39.8 MM HG (ref 35–48)
POC PCO2: 44.3 MM HG (ref 35–48)
POC PH TEMP: 7.33
POC PH TEMP: 7.34
POC PH TEMP: 7.39
POC PH TEMP: 7.4
POC PH TEMP: 7.56
POC PH: 7.1 (ref 7.35–7.45)
POC PH: 7.3 (ref 7.35–7.45)
POC PH: 7.31 (ref 7.35–7.45)
POC PH: 7.41 (ref 7.35–7.45)
POC PH: 7.42 (ref 7.35–7.45)
POC PH: 7.42 (ref 7.35–7.45)
POC PH: 7.53 (ref 7.35–7.45)
POC PO2 TEMP: 100 MM HG
POC PO2 TEMP: 363 MM HG
POC PO2 TEMP: 54 MM HG
POC PO2 TEMP: 81 MM HG
POC PO2 TEMP: 86 MM HG
POC PO2: 376.2 MM HG (ref 83–108)
POC PO2: 61.4 MM HG (ref 83–108)
POC PO2: 71.5 MM HG (ref 83–108)
POC PO2: 72.7 MM HG (ref 83–108)
POC PO2: 74.2 MM HG (ref 83–108)
POC PO2: 93.7 MM HG (ref 83–108)
POC PO2: 95.2 MM HG (ref 83–108)
POC POTASSIUM: 3.6 MMOL/L (ref 3.5–4.5)
POC POTASSIUM: 4.1 MMOL/L (ref 3.5–4.5)
POC SODIUM: 141 MMOL/L (ref 138–146)
POC SODIUM: 141 MMOL/L (ref 138–146)
POC TCO2: 15 MMOL/L (ref 22–30)
POC TCO2: 17 MMOL/L (ref 22–30)
POSITIVE BASE EXCESS, ART: 0 (ref 0–3)
POSITIVE BASE EXCESS, ART: 1 (ref 0–3)
POTASSIUM SERPL-SCNC: 3.1 MMOL/L (ref 3.7–5.3)
POTASSIUM SERPL-SCNC: 3.5 MMOL/L (ref 3.7–5.3)
POTASSIUM SERPL-SCNC: 3.5 MMOL/L (ref 3.7–5.3)
POTASSIUM SERPL-SCNC: 3.6 MMOL/L (ref 3.7–5.3)
POTASSIUM SERPL-SCNC: 3.6 MMOL/L (ref 3.7–5.3)
POTASSIUM SERPL-SCNC: 3.7 MMOL/L (ref 3.7–5.3)
POTASSIUM SERPL-SCNC: 3.7 MMOL/L (ref 3.7–5.3)
POTASSIUM SERPL-SCNC: 4.1 MMOL/L (ref 3.7–5.3)
PRO-BNP: 1512 PG/ML
PROTEIN UA: ABNORMAL
PROTHROMBIN TIME: 16.3 SEC (ref 9.1–12.3)
PROTHROMBIN TIME: 20 SEC (ref 9.1–12.3)
RBC # BLD: 2.61 M/UL (ref 4.21–5.77)
RBC # BLD: 2.73 M/UL (ref 4.21–5.77)
RBC # BLD: 2.95 M/UL (ref 4.21–5.77)
RBC # BLD: 2.96 M/UL (ref 4.21–5.77)
RBC # BLD: ABNORMAL 10*6/UL
RBC UA: ABNORMAL /HPF (ref 0–4)
SALICYLATE LEVEL: <1 MG/DL (ref 3–10)
SAMPLE SITE: ABNORMAL
SARS-COV-2, RAPID: NOT DETECTED
SEG NEUTROPHILS: 52 % (ref 36–66)
SEG NEUTROPHILS: 72 % (ref 36–66)
SEG NEUTROPHILS: 80 % (ref 36–65)
SEG NEUTROPHILS: 83 % (ref 36–66)
SEGMENTED NEUTROPHILS ABSOLUTE COUNT: 12.53 K/UL (ref 1.8–7.7)
SEGMENTED NEUTROPHILS ABSOLUTE COUNT: 6.65 K/UL (ref 1.8–7.7)
SEGMENTED NEUTROPHILS ABSOLUTE COUNT: 6.77 K/UL (ref 1.8–7.7)
SEGMENTED NEUTROPHILS ABSOLUTE COUNT: 7.95 K/UL (ref 1.5–8.1)
SODIUM BLD-SCNC: 131 MMOL/L (ref 135–144)
SODIUM BLD-SCNC: 134 MMOL/L (ref 135–144)
SODIUM BLD-SCNC: 134 MMOL/L (ref 135–144)
SODIUM BLD-SCNC: 136 MMOL/L (ref 135–144)
SODIUM BLD-SCNC: 137 MMOL/L (ref 135–144)
SODIUM BLD-SCNC: 138 MMOL/L (ref 135–144)
SODIUM BLD-SCNC: 141 MMOL/L (ref 135–144)
SODIUM BLD-SCNC: 142 MMOL/L (ref 135–144)
SPECIFIC GRAVITY UA: 1.02 (ref 1–1.03)
SPECIMEN DESCRIPTION: ABNORMAL
SPECIMEN DESCRIPTION: ABNORMAL
SPECIMEN DESCRIPTION: NORMAL
SPECIMEN DESCRIPTION: NORMAL
SPECIMEN TYPE: NORMAL
SPECIMEN TYPE: NORMAL
SURGICAL PATHOLOGY REPORT: NORMAL
TOTAL PROTEIN, BODY FLUID: 1.3 G/DL
TOTAL PROTEIN: 5.1 G/DL (ref 6.4–8.3)
TOTAL PROTEIN: 5.2 G/DL (ref 6.4–8.3)
TOTAL PROTEIN: 5.7 G/DL (ref 6.4–8.3)
TOTAL PROTEIN: 5.7 G/DL (ref 6.4–8.3)
TOTAL PROTEIN: 6 G/DL (ref 6.4–8.3)
TOXIC TRICYCLIC SC,BLOOD: NEGATIVE
TRANSFUSION STATUS: NORMAL
TRIGL SERPL-MCNC: 50 MG/DL
TROPONIN, HIGH SENSITIVITY: 108 NG/L (ref 0–22)
TROPONIN, HIGH SENSITIVITY: 12 NG/L (ref 0–22)
TROPONIN, HIGH SENSITIVITY: 42 NG/L (ref 0–22)
TROPONIN, HIGH SENSITIVITY: 83 NG/L (ref 0–22)
TROPONIN, HIGH SENSITIVITY: 94 NG/L (ref 0–22)
TURBIDITY: ABNORMAL
UNIT DIVISION: 0
UNIT ISSUE DATE/TIME: NORMAL
URINE HGB: ABNORMAL
UROBILINOGEN, URINE: NORMAL
WBC # BLD: 12.8 K/UL (ref 3.5–11.3)
WBC # BLD: 15.1 K/UL (ref 3.5–11.3)
WBC # BLD: 9.4 K/UL (ref 3.5–11.3)
WBC # BLD: 9.9 K/UL (ref 3.5–11.3)
WBC UA: ABNORMAL /HPF (ref 0–5)

## 2022-01-01 PROCEDURE — 6360000002 HC RX W HCPCS: Performed by: STUDENT IN AN ORGANIZED HEALTH CARE EDUCATION/TRAINING PROGRAM

## 2022-01-01 PROCEDURE — 82330 ASSAY OF CALCIUM: CPT

## 2022-01-01 PROCEDURE — 99291 CRITICAL CARE FIRST HOUR: CPT | Performed by: INTERNAL MEDICINE

## 2022-01-01 PROCEDURE — 93306 TTE W/DOPPLER COMPLETE: CPT

## 2022-01-01 PROCEDURE — 80307 DRUG TEST PRSMV CHEM ANLYZR: CPT

## 2022-01-01 PROCEDURE — 82803 BLOOD GASES ANY COMBINATION: CPT

## 2022-01-01 PROCEDURE — 85025 COMPLETE CBC W/AUTO DIFF WBC: CPT

## 2022-01-01 PROCEDURE — C9113 INJ PANTOPRAZOLE SODIUM, VIA: HCPCS | Performed by: STUDENT IN AN ORGANIZED HEALTH CARE EDUCATION/TRAINING PROGRAM

## 2022-01-01 PROCEDURE — 99223 1ST HOSP IP/OBS HIGH 75: CPT | Performed by: INTERNAL MEDICINE

## 2022-01-01 PROCEDURE — P9041 ALBUMIN (HUMAN),5%, 50ML: HCPCS

## 2022-01-01 PROCEDURE — 87075 CULTR BACTERIA EXCEPT BLOOD: CPT

## 2022-01-01 PROCEDURE — 3609017100 HC EGD: Performed by: INTERNAL MEDICINE

## 2022-01-01 PROCEDURE — 74018 RADEX ABDOMEN 1 VIEW: CPT

## 2022-01-01 PROCEDURE — 99231 SBSQ HOSP IP/OBS SF/LOW 25: CPT | Performed by: INTERNAL MEDICINE

## 2022-01-01 PROCEDURE — 2580000003 HC RX 258: Performed by: STUDENT IN AN ORGANIZED HEALTH CARE EDUCATION/TRAINING PROGRAM

## 2022-01-01 PROCEDURE — 2700000000 HC OXYGEN THERAPY PER DAY

## 2022-01-01 PROCEDURE — 36430 TRANSFUSION BLD/BLD COMPNT: CPT

## 2022-01-01 PROCEDURE — 83880 ASSAY OF NATRIURETIC PEPTIDE: CPT

## 2022-01-01 PROCEDURE — 2500000003 HC RX 250 WO HCPCS: Performed by: STUDENT IN AN ORGANIZED HEALTH CARE EDUCATION/TRAINING PROGRAM

## 2022-01-01 PROCEDURE — 82565 ASSAY OF CREATININE: CPT

## 2022-01-01 PROCEDURE — 83735 ASSAY OF MAGNESIUM: CPT

## 2022-01-01 PROCEDURE — 74176 CT ABD & PELVIS W/O CONTRAST: CPT

## 2022-01-01 PROCEDURE — 6360000002 HC RX W HCPCS

## 2022-01-01 PROCEDURE — 0BH18EZ INSERTION OF ENDOTRACHEAL AIRWAY INTO TRACHEA, VIA NATURAL OR ARTIFICIAL OPENING ENDOSCOPIC: ICD-10-PCS | Performed by: STUDENT IN AN ORGANIZED HEALTH CARE EDUCATION/TRAINING PROGRAM

## 2022-01-01 PROCEDURE — 99222 1ST HOSP IP/OBS MODERATE 55: CPT | Performed by: NURSE PRACTITIONER

## 2022-01-01 PROCEDURE — 93005 ELECTROCARDIOGRAM TRACING: CPT | Performed by: STUDENT IN AN ORGANIZED HEALTH CARE EDUCATION/TRAINING PROGRAM

## 2022-01-01 PROCEDURE — 95720 EEG PHY/QHP EA INCR W/VEEG: CPT | Performed by: PSYCHIATRY & NEUROLOGY

## 2022-01-01 PROCEDURE — 2000000000 HC ICU R&B

## 2022-01-01 PROCEDURE — 2580000003 HC RX 258: Performed by: INTERNAL MEDICINE

## 2022-01-01 PROCEDURE — 80076 HEPATIC FUNCTION PANEL: CPT

## 2022-01-01 PROCEDURE — 6360000002 HC RX W HCPCS: Performed by: INTERNAL MEDICINE

## 2022-01-01 PROCEDURE — 99024 POSTOP FOLLOW-UP VISIT: CPT | Performed by: PHYSICIAN ASSISTANT

## 2022-01-01 PROCEDURE — 83605 ASSAY OF LACTIC ACID: CPT

## 2022-01-01 PROCEDURE — 85018 HEMOGLOBIN: CPT

## 2022-01-01 PROCEDURE — 80051 ELECTROLYTE PANEL: CPT

## 2022-01-01 PROCEDURE — 95714 VEEG EA 12-26 HR UNMNTR: CPT

## 2022-01-01 PROCEDURE — 87635 SARS-COV-2 COVID-19 AMP PRB: CPT

## 2022-01-01 PROCEDURE — 0W9G3ZZ DRAINAGE OF PERITONEAL CAVITY, PERCUTANEOUS APPROACH: ICD-10-PCS | Performed by: RADIOLOGY

## 2022-01-01 PROCEDURE — 93010 ELECTROCARDIOGRAM REPORT: CPT | Performed by: INTERNAL MEDICINE

## 2022-01-01 PROCEDURE — 80048 BASIC METABOLIC PNL TOTAL CA: CPT

## 2022-01-01 PROCEDURE — 99222 1ST HOSP IP/OBS MODERATE 55: CPT | Performed by: INTERNAL MEDICINE

## 2022-01-01 PROCEDURE — 70450 CT HEAD/BRAIN W/O DYE: CPT

## 2022-01-01 PROCEDURE — 86901 BLOOD TYPING SEROLOGIC RH(D): CPT

## 2022-01-01 PROCEDURE — 6370000000 HC RX 637 (ALT 250 FOR IP): Performed by: INTERNAL MEDICINE

## 2022-01-01 PROCEDURE — 5A1945Z RESPIRATORY VENTILATION, 24-96 CONSECUTIVE HOURS: ICD-10-PCS | Performed by: STUDENT IN AN ORGANIZED HEALTH CARE EDUCATION/TRAINING PROGRAM

## 2022-01-01 PROCEDURE — 94002 VENT MGMT INPAT INIT DAY: CPT

## 2022-01-01 PROCEDURE — 6370000000 HC RX 637 (ALT 250 FOR IP): Performed by: NURSE PRACTITIONER

## 2022-01-01 PROCEDURE — 82140 ASSAY OF AMMONIA: CPT

## 2022-01-01 PROCEDURE — 82947 ASSAY GLUCOSE BLOOD QUANT: CPT

## 2022-01-01 PROCEDURE — 87070 CULTURE OTHR SPECIMN AEROBIC: CPT

## 2022-01-01 PROCEDURE — 86920 COMPATIBILITY TEST SPIN: CPT

## 2022-01-01 PROCEDURE — 71045 X-RAY EXAM CHEST 1 VIEW: CPT

## 2022-01-01 PROCEDURE — 87205 SMEAR GRAM STAIN: CPT

## 2022-01-01 PROCEDURE — 84100 ASSAY OF PHOSPHORUS: CPT

## 2022-01-01 PROCEDURE — 95700 EEG CONT REC W/VID EEG TECH: CPT

## 2022-01-01 PROCEDURE — 36620 INSERTION CATHETER ARTERY: CPT | Performed by: INTERNAL MEDICINE

## 2022-01-01 PROCEDURE — 37799 UNLISTED PX VASCULAR SURGERY: CPT

## 2022-01-01 PROCEDURE — 80143 DRUG ASSAY ACETAMINOPHEN: CPT

## 2022-01-01 PROCEDURE — 86900 BLOOD TYPING SEROLOGIC ABO: CPT

## 2022-01-01 PROCEDURE — P9016 RBC LEUKOCYTES REDUCED: HCPCS

## 2022-01-01 PROCEDURE — 03HY32Z INSERTION OF MONITORING DEVICE INTO UPPER ARTERY, PERCUTANEOUS APPROACH: ICD-10-PCS | Performed by: INTERNAL MEDICINE

## 2022-01-01 PROCEDURE — 84484 ASSAY OF TROPONIN QUANT: CPT

## 2022-01-01 PROCEDURE — 94003 VENT MGMT INPAT SUBQ DAY: CPT

## 2022-01-01 PROCEDURE — 99232 SBSQ HOSP IP/OBS MODERATE 35: CPT | Performed by: INTERNAL MEDICINE

## 2022-01-01 PROCEDURE — 86850 RBC ANTIBODY SCREEN: CPT

## 2022-01-01 PROCEDURE — 84157 ASSAY OF PROTEIN OTHER: CPT

## 2022-01-01 PROCEDURE — 87086 URINE CULTURE/COLONY COUNT: CPT

## 2022-01-01 PROCEDURE — 84520 ASSAY OF UREA NITROGEN: CPT

## 2022-01-01 PROCEDURE — 87186 SC STD MICRODIL/AGAR DIL: CPT

## 2022-01-01 PROCEDURE — 6360000002 HC RX W HCPCS: Performed by: NURSE PRACTITIONER

## 2022-01-01 PROCEDURE — 36620 INSERTION CATHETER ARTERY: CPT

## 2022-01-01 PROCEDURE — 49083 ABD PARACENTESIS W/IMAGING: CPT

## 2022-01-01 PROCEDURE — 82805 BLOOD GASES W/O2 SATURATION: CPT

## 2022-01-01 PROCEDURE — 94761 N-INVAS EAR/PLS OXIMETRY MLT: CPT

## 2022-01-01 PROCEDURE — 6370000000 HC RX 637 (ALT 250 FOR IP): Performed by: STUDENT IN AN ORGANIZED HEALTH CARE EDUCATION/TRAINING PROGRAM

## 2022-01-01 PROCEDURE — 84478 ASSAY OF TRIGLYCERIDES: CPT

## 2022-01-01 PROCEDURE — 80053 COMPREHEN METABOLIC PANEL: CPT

## 2022-01-01 PROCEDURE — 74174 CTA ABD&PLVS W/CONTRAST: CPT

## 2022-01-01 PROCEDURE — 43235 EGD DIAGNOSTIC BRUSH WASH: CPT | Performed by: INTERNAL MEDICINE

## 2022-01-01 PROCEDURE — A4216 STERILE WATER/SALINE, 10 ML: HCPCS | Performed by: STUDENT IN AN ORGANIZED HEALTH CARE EDUCATION/TRAINING PROGRAM

## 2022-01-01 PROCEDURE — 88305 TISSUE EXAM BY PATHOLOGIST: CPT

## 2022-01-01 PROCEDURE — 71275 CT ANGIOGRAPHY CHEST: CPT

## 2022-01-01 PROCEDURE — G0480 DRUG TEST DEF 1-7 CLASSES: HCPCS

## 2022-01-01 PROCEDURE — 80179 DRUG ASSAY SALICYLATE: CPT

## 2022-01-01 PROCEDURE — 70551 MRI BRAIN STEM W/O DYE: CPT

## 2022-01-01 PROCEDURE — 99232 SBSQ HOSP IP/OBS MODERATE 35: CPT | Performed by: NURSE PRACTITIONER

## 2022-01-01 PROCEDURE — 1200000000 HC SEMI PRIVATE

## 2022-01-01 PROCEDURE — 99284 EMERGENCY DEPT VISIT MOD MDM: CPT

## 2022-01-01 PROCEDURE — 2580000003 HC RX 258

## 2022-01-01 PROCEDURE — 87077 CULTURE AEROBIC IDENTIFY: CPT

## 2022-01-01 PROCEDURE — 95711 VEEG 2-12 HR UNMONITORED: CPT

## 2022-01-01 PROCEDURE — 51798 US URINE CAPACITY MEASURE: CPT

## 2022-01-01 PROCEDURE — 71250 CT THORAX DX C-: CPT

## 2022-01-01 PROCEDURE — 99222 1ST HOSP IP/OBS MODERATE 55: CPT | Performed by: FAMILY MEDICINE

## 2022-01-01 PROCEDURE — 0DJ08ZZ INSPECTION OF UPPER INTESTINAL TRACT, VIA NATURAL OR ARTIFICIAL OPENING ENDOSCOPIC: ICD-10-PCS | Performed by: INTERNAL MEDICINE

## 2022-01-01 PROCEDURE — 83615 LACTATE (LD) (LDH) ENZYME: CPT

## 2022-01-01 PROCEDURE — 36415 COLL VENOUS BLD VENIPUNCTURE: CPT

## 2022-01-01 PROCEDURE — 87641 MR-STAPH DNA AMP PROBE: CPT

## 2022-01-01 PROCEDURE — 85014 HEMATOCRIT: CPT

## 2022-01-01 PROCEDURE — 99221 1ST HOSP IP/OBS SF/LOW 40: CPT | Performed by: OTOLARYNGOLOGY

## 2022-01-01 PROCEDURE — 86316 IMMUNOASSAY TUMOR OTHER: CPT

## 2022-01-01 PROCEDURE — 85730 THROMBOPLASTIN TIME PARTIAL: CPT

## 2022-01-01 PROCEDURE — 72125 CT NECK SPINE W/O DYE: CPT

## 2022-01-01 PROCEDURE — 06HY33Z INSERTION OF INFUSION DEVICE INTO LOWER VEIN, PERCUTANEOUS APPROACH: ICD-10-PCS | Performed by: STUDENT IN AN ORGANIZED HEALTH CARE EDUCATION/TRAINING PROGRAM

## 2022-01-01 PROCEDURE — 6360000004 HC RX CONTRAST MEDICATION: Performed by: STUDENT IN AN ORGANIZED HEALTH CARE EDUCATION/TRAINING PROGRAM

## 2022-01-01 PROCEDURE — 88112 CYTOPATH CELL ENHANCE TECH: CPT

## 2022-01-01 PROCEDURE — 81001 URINALYSIS AUTO W/SCOPE: CPT

## 2022-01-01 PROCEDURE — 85610 PROTHROMBIN TIME: CPT

## 2022-01-01 PROCEDURE — 82042 OTHER SOURCE ALBUMIN QUAN EA: CPT

## 2022-01-01 PROCEDURE — 76775 US EXAM ABDO BACK WALL LIM: CPT

## 2022-01-01 RX ORDER — MIDAZOLAM HYDROCHLORIDE 2 MG/2ML
10 INJECTION, SOLUTION INTRAMUSCULAR; INTRAVENOUS ONCE
Status: COMPLETED | OUTPATIENT
Start: 2022-01-01 | End: 2022-01-01

## 2022-01-01 RX ORDER — POTASSIUM CHLORIDE 29.8 MG/ML
20 INJECTION INTRAVENOUS
Status: COMPLETED | OUTPATIENT
Start: 2022-01-01 | End: 2022-01-01

## 2022-01-01 RX ORDER — CALCIUM GLUCONATE 20 MG/ML
1000 INJECTION, SOLUTION INTRAVENOUS ONCE
Status: COMPLETED | OUTPATIENT
Start: 2022-01-01 | End: 2022-01-01

## 2022-01-01 RX ORDER — FENTANYL CITRATE 50 UG/ML
INJECTION, SOLUTION INTRAMUSCULAR; INTRAVENOUS
Status: COMPLETED
Start: 2022-01-01 | End: 2022-01-01

## 2022-01-01 RX ORDER — PROPOFOL 10 MG/ML
5-50 INJECTION, EMULSION INTRAVENOUS CONTINUOUS
Status: DISCONTINUED | OUTPATIENT
Start: 2022-01-01 | End: 2022-01-01

## 2022-01-01 RX ORDER — MIDAZOLAM HYDROCHLORIDE 2 MG/2ML
2 INJECTION, SOLUTION INTRAMUSCULAR; INTRAVENOUS ONCE
Status: COMPLETED | OUTPATIENT
Start: 2022-01-01 | End: 2022-01-01

## 2022-01-01 RX ORDER — PROPOFOL 10 MG/ML
INJECTION, EMULSION INTRAVENOUS
Status: COMPLETED
Start: 2022-01-01 | End: 2022-01-01

## 2022-01-01 RX ORDER — SODIUM CHLORIDE 9 MG/ML
INJECTION, SOLUTION INTRAVENOUS CONTINUOUS
Status: DISCONTINUED | OUTPATIENT
Start: 2022-01-01 | End: 2022-01-01

## 2022-01-01 RX ORDER — MORPHINE SULFATE 2 MG/ML
2 INJECTION, SOLUTION INTRAMUSCULAR; INTRAVENOUS
Status: DISCONTINUED | OUTPATIENT
Start: 2022-01-01 | End: 2022-01-01

## 2022-01-01 RX ORDER — NOREPINEPHRINE BIT/0.9 % NACL 16MG/250ML
1-100 INFUSION BOTTLE (ML) INTRAVENOUS CONTINUOUS
Status: DISCONTINUED | OUTPATIENT
Start: 2022-01-01 | End: 2022-01-01

## 2022-01-01 RX ORDER — LIDOCAINE HYDROCHLORIDE 20 MG/ML
15 SOLUTION OROPHARYNGEAL ONCE
Status: COMPLETED | OUTPATIENT
Start: 2022-01-01 | End: 2022-01-01

## 2022-01-01 RX ORDER — ALBUMIN, HUMAN INJ 5% 5 %
25 SOLUTION INTRAVENOUS ONCE
Status: COMPLETED | OUTPATIENT
Start: 2022-01-01 | End: 2022-01-01

## 2022-01-01 RX ORDER — 0.9 % SODIUM CHLORIDE 0.9 %
1000 INTRAVENOUS SOLUTION INTRAVENOUS ONCE
Status: COMPLETED | OUTPATIENT
Start: 2022-01-01 | End: 2022-01-01

## 2022-01-01 RX ORDER — FENTANYL CITRATE 50 UG/ML
50 INJECTION, SOLUTION INTRAMUSCULAR; INTRAVENOUS ONCE
Status: COMPLETED | OUTPATIENT
Start: 2022-01-01 | End: 2022-01-01

## 2022-01-01 RX ORDER — MIDAZOLAM HYDROCHLORIDE 1 MG/ML
INJECTION INTRAMUSCULAR; INTRAVENOUS
Status: COMPLETED
Start: 2022-01-01 | End: 2022-01-01

## 2022-01-01 RX ORDER — POTASSIUM CHLORIDE 7.45 MG/ML
20 INJECTION INTRAVENOUS ONCE
Status: COMPLETED | OUTPATIENT
Start: 2022-01-01 | End: 2022-01-01

## 2022-01-01 RX ORDER — SODIUM CHLORIDE 9 MG/ML
INJECTION, SOLUTION INTRAVENOUS PRN
Status: DISCONTINUED | OUTPATIENT
Start: 2022-01-01 | End: 2022-01-01

## 2022-01-01 RX ORDER — DEXTROSE MONOHYDRATE 25 G/50ML
12.5 INJECTION, SOLUTION INTRAVENOUS PRN
Status: DISCONTINUED | OUTPATIENT
Start: 2022-01-01 | End: 2022-01-01

## 2022-01-01 RX ORDER — FENTANYL CITRATE 50 UG/ML
50 INJECTION, SOLUTION INTRAMUSCULAR; INTRAVENOUS
Status: DISCONTINUED | OUTPATIENT
Start: 2022-01-01 | End: 2022-01-01 | Stop reason: HOSPADM

## 2022-01-01 RX ORDER — NICOTINE POLACRILEX 4 MG
15 LOZENGE BUCCAL PRN
Status: DISCONTINUED | OUTPATIENT
Start: 2022-01-01 | End: 2022-01-01

## 2022-01-01 RX ORDER — ROCURONIUM BROMIDE 10 MG/ML
INJECTION, SOLUTION INTRAVENOUS
Status: DISCONTINUED
Start: 2022-01-01 | End: 2022-01-01

## 2022-01-01 RX ORDER — LEVETIRACETAM 10 MG/ML
1000 INJECTION INTRAVASCULAR EVERY 12 HOURS
Status: DISCONTINUED | OUTPATIENT
Start: 2022-01-01 | End: 2022-01-01

## 2022-01-01 RX ORDER — METOCLOPRAMIDE HYDROCHLORIDE 5 MG/ML
10 INJECTION INTRAMUSCULAR; INTRAVENOUS ONCE
Status: COMPLETED | OUTPATIENT
Start: 2022-01-01 | End: 2022-01-01

## 2022-01-01 RX ORDER — ROCURONIUM BROMIDE 10 MG/ML
50 INJECTION, SOLUTION INTRAVENOUS
Status: DISCONTINUED | OUTPATIENT
Start: 2022-01-01 | End: 2022-01-01

## 2022-01-01 RX ORDER — ONDANSETRON 2 MG/ML
4 INJECTION INTRAMUSCULAR; INTRAVENOUS EVERY 6 HOURS PRN
Status: DISCONTINUED | OUTPATIENT
Start: 2022-01-01 | End: 2022-01-01 | Stop reason: HOSPADM

## 2022-01-01 RX ORDER — POTASSIUM CHLORIDE 29.8 MG/ML
20 INJECTION INTRAVENOUS PRN
Status: DISCONTINUED | OUTPATIENT
Start: 2022-01-01 | End: 2022-01-01

## 2022-01-01 RX ORDER — GLYCOPYRROLATE 0.2 MG/ML
0.2 INJECTION INTRAMUSCULAR; INTRAVENOUS EVERY 4 HOURS PRN
Status: DISCONTINUED | OUTPATIENT
Start: 2022-01-01 | End: 2022-01-01 | Stop reason: HOSPADM

## 2022-01-01 RX ORDER — DOPAMINE HYDROCHLORIDE 160 MG/100ML
1-20 INJECTION, SOLUTION INTRAVENOUS CONTINUOUS
Status: DISCONTINUED | OUTPATIENT
Start: 2022-01-01 | End: 2022-01-01

## 2022-01-01 RX ORDER — FENTANYL CITRATE 50 UG/ML
INJECTION, SOLUTION INTRAMUSCULAR; INTRAVENOUS
Status: DISPENSED
Start: 2022-01-01 | End: 2022-01-01

## 2022-01-01 RX ORDER — MORPHINE SULFATE 10 MG/ML
5 INJECTION, SOLUTION INTRAMUSCULAR; INTRAVENOUS
Status: DISCONTINUED | OUTPATIENT
Start: 2022-01-01 | End: 2022-01-01 | Stop reason: HOSPADM

## 2022-01-01 RX ORDER — ONDANSETRON 4 MG/1
4 TABLET, ORALLY DISINTEGRATING ORAL EVERY 8 HOURS PRN
Status: DISCONTINUED | OUTPATIENT
Start: 2022-01-01 | End: 2022-01-01

## 2022-01-01 RX ORDER — MORPHINE SULFATE 4 MG/ML
4 INJECTION, SOLUTION INTRAMUSCULAR; INTRAVENOUS
Status: DISCONTINUED | OUTPATIENT
Start: 2022-01-01 | End: 2022-01-01

## 2022-01-01 RX ORDER — LORAZEPAM 2 MG/ML
0.5 INJECTION INTRAMUSCULAR
Status: DISCONTINUED | OUTPATIENT
Start: 2022-01-01 | End: 2022-01-01 | Stop reason: HOSPADM

## 2022-01-01 RX ORDER — DEXTROSE MONOHYDRATE 50 MG/ML
100 INJECTION, SOLUTION INTRAVENOUS PRN
Status: DISCONTINUED | OUTPATIENT
Start: 2022-01-01 | End: 2022-01-01

## 2022-01-01 RX ORDER — POLYETHYLENE GLYCOL 3350 17 G/17G
17 POWDER, FOR SOLUTION ORAL DAILY PRN
Status: DISCONTINUED | OUTPATIENT
Start: 2022-01-01 | End: 2022-01-01

## 2022-01-01 RX ADMIN — SODIUM BICARBONATE: 84 INJECTION, SOLUTION INTRAVENOUS at 11:04

## 2022-01-01 RX ADMIN — LORAZEPAM 0.5 MG: 2 INJECTION INTRAMUSCULAR; INTRAVENOUS at 00:31

## 2022-01-01 RX ADMIN — MIDAZOLAM HYDROCHLORIDE 2 MG: 2 INJECTION, SOLUTION INTRAMUSCULAR; INTRAVENOUS at 02:27

## 2022-01-01 RX ADMIN — LEVETIRACETAM 1000 MG: 10 INJECTION INTRAVENOUS at 18:28

## 2022-01-01 RX ADMIN — SODIUM CHLORIDE 3000 MG: 900 INJECTION INTRAVENOUS at 01:39

## 2022-01-01 RX ADMIN — LORAZEPAM 0.5 MG: 2 INJECTION INTRAMUSCULAR; INTRAVENOUS at 17:11

## 2022-01-01 RX ADMIN — Medication 4 MG/HR: at 18:35

## 2022-01-01 RX ADMIN — LEVETIRACETAM 4000 MG: 100 INJECTION, SOLUTION INTRAVENOUS at 18:54

## 2022-01-01 RX ADMIN — MORPHINE SULFATE 4 MG: 4 INJECTION INTRAVENOUS at 03:34

## 2022-01-01 RX ADMIN — FENTANYL CITRATE 50 MCG: 50 INJECTION, SOLUTION INTRAMUSCULAR; INTRAVENOUS at 01:11

## 2022-01-01 RX ADMIN — Medication 2 MG/HR: at 15:59

## 2022-01-01 RX ADMIN — SODIUM CHLORIDE: 9 INJECTION, SOLUTION INTRAVENOUS at 20:59

## 2022-01-01 RX ADMIN — POTASSIUM CHLORIDE 20 MEQ: 29.8 INJECTION, SOLUTION INTRAVENOUS at 11:26

## 2022-01-01 RX ADMIN — LORAZEPAM 0.5 MG: 2 INJECTION INTRAMUSCULAR; INTRAVENOUS at 02:51

## 2022-01-01 RX ADMIN — MIDAZOLAM HYDROCHLORIDE 2 MG: 1 INJECTION, SOLUTION INTRAMUSCULAR; INTRAVENOUS at 15:21

## 2022-01-01 RX ADMIN — MIDAZOLAM HYDROCHLORIDE 2 MG: 1 INJECTION, SOLUTION INTRAMUSCULAR; INTRAVENOUS at 02:27

## 2022-01-01 RX ADMIN — GLYCOPYRROLATE 0.2 MG: 0.2 INJECTION INTRAMUSCULAR; INTRAVENOUS at 10:37

## 2022-01-01 RX ADMIN — FENTANYL CITRATE 50 MCG: 50 INJECTION, SOLUTION INTRAMUSCULAR; INTRAVENOUS at 12:19

## 2022-01-01 RX ADMIN — MORPHINE SULFATE 2 MG: 2 INJECTION, SOLUTION INTRAMUSCULAR; INTRAVENOUS at 22:53

## 2022-01-01 RX ADMIN — SODIUM CHLORIDE 3000 MG: 900 INJECTION INTRAVENOUS at 02:07

## 2022-01-01 RX ADMIN — RIFAXIMIN 200 MG: 200 TABLET ORAL at 04:28

## 2022-01-01 RX ADMIN — RIFAXIMIN 200 MG: 200 TABLET ORAL at 14:41

## 2022-01-01 RX ADMIN — MORPHINE SULFATE 2 MG: 2 INJECTION, SOLUTION INTRAMUSCULAR; INTRAVENOUS at 22:34

## 2022-01-01 RX ADMIN — RIFAXIMIN 200 MG: 200 TABLET ORAL at 14:04

## 2022-01-01 RX ADMIN — MORPHINE SULFATE 2 MG: 2 INJECTION, SOLUTION INTRAMUSCULAR; INTRAVENOUS at 19:19

## 2022-01-01 RX ADMIN — RIFAXIMIN 200 MG: 200 TABLET ORAL at 09:30

## 2022-01-01 RX ADMIN — SODIUM CHLORIDE 3000 MG: 900 INJECTION INTRAVENOUS at 14:32

## 2022-01-01 RX ADMIN — MORPHINE SULFATE 2 MG: 2 INJECTION, SOLUTION INTRAMUSCULAR; INTRAVENOUS at 00:47

## 2022-01-01 RX ADMIN — SODIUM CHLORIDE, PRESERVATIVE FREE 40 MG: 5 INJECTION INTRAVENOUS at 16:12

## 2022-01-01 RX ADMIN — MORPHINE SULFATE 2 MG: 2 INJECTION, SOLUTION INTRAMUSCULAR; INTRAVENOUS at 23:21

## 2022-01-01 RX ADMIN — MORPHINE SULFATE 4 MG: 4 INJECTION INTRAVENOUS at 02:25

## 2022-01-01 RX ADMIN — INSULIN LISPRO 6 UNITS: 100 INJECTION, SOLUTION INTRAVENOUS; SUBCUTANEOUS at 20:49

## 2022-01-01 RX ADMIN — MIDAZOLAM HYDROCHLORIDE 2 MG: 1 INJECTION, SOLUTION INTRAMUSCULAR; INTRAVENOUS at 14:13

## 2022-01-01 RX ADMIN — LORAZEPAM 0.5 MG: 2 INJECTION INTRAMUSCULAR; INTRAVENOUS at 20:15

## 2022-01-01 RX ADMIN — LORAZEPAM 0.5 MG: 2 INJECTION INTRAMUSCULAR; INTRAVENOUS at 01:17

## 2022-01-01 RX ADMIN — POTASSIUM CHLORIDE 20 MEQ: 7.46 INJECTION, SOLUTION INTRAVENOUS at 20:39

## 2022-01-01 RX ADMIN — CALCIUM GLUCONATE 1000 MG: 20 INJECTION, SOLUTION INTRAVENOUS at 20:42

## 2022-01-01 RX ADMIN — MORPHINE SULFATE 2 MG: 2 INJECTION, SOLUTION INTRAMUSCULAR; INTRAVENOUS at 21:03

## 2022-01-01 RX ADMIN — INSULIN LISPRO 3 UNITS: 100 INJECTION, SOLUTION INTRAVENOUS; SUBCUTANEOUS at 14:29

## 2022-01-01 RX ADMIN — MIDAZOLAM HYDROCHLORIDE 2 MG: 2 INJECTION, SOLUTION INTRAMUSCULAR; INTRAVENOUS at 15:17

## 2022-01-01 RX ADMIN — SODIUM CHLORIDE 3000 MG: 900 INJECTION INTRAVENOUS at 14:51

## 2022-01-01 RX ADMIN — GLYCOPYRROLATE 0.2 MG: 0.2 INJECTION INTRAMUSCULAR; INTRAVENOUS at 21:04

## 2022-01-01 RX ADMIN — MORPHINE SULFATE 5 MG: 10 INJECTION INTRAVENOUS at 10:37

## 2022-01-01 RX ADMIN — MORPHINE SULFATE 5 MG: 10 INJECTION INTRAVENOUS at 09:49

## 2022-01-01 RX ADMIN — SODIUM BICARBONATE: 84 INJECTION, SOLUTION INTRAVENOUS at 09:49

## 2022-01-01 RX ADMIN — ALBUMIN (HUMAN) 25 G: 12.5 INJECTION, SOLUTION INTRAVENOUS at 16:11

## 2022-01-01 RX ADMIN — LORAZEPAM 0.5 MG: 2 INJECTION INTRAMUSCULAR; INTRAVENOUS at 22:59

## 2022-01-01 RX ADMIN — MORPHINE SULFATE 2 MG: 2 INJECTION, SOLUTION INTRAMUSCULAR; INTRAVENOUS at 00:27

## 2022-01-01 RX ADMIN — SODIUM CHLORIDE, PRESERVATIVE FREE 40 MG: 5 INJECTION INTRAVENOUS at 16:53

## 2022-01-01 RX ADMIN — LEVETIRACETAM 1000 MG: 10 INJECTION INTRAVENOUS at 18:39

## 2022-01-01 RX ADMIN — PROPOFOL 10 MCG/KG/MIN: 10 INJECTION, EMULSION INTRAVENOUS at 12:31

## 2022-01-01 RX ADMIN — OCTREOTIDE ACETATE 25 MCG/HR: 500 INJECTION, SOLUTION INTRAVENOUS; SUBCUTANEOUS at 13:32

## 2022-01-01 RX ADMIN — FUROSEMIDE 40 MG/HR: 10 INJECTION, SOLUTION INTRAMUSCULAR; INTRAVENOUS at 18:30

## 2022-01-01 RX ADMIN — LORAZEPAM 0.5 MG: 2 INJECTION INTRAMUSCULAR; INTRAVENOUS at 07:32

## 2022-01-01 RX ADMIN — LEVETIRACETAM 1000 MG: 10 INJECTION INTRAVENOUS at 04:09

## 2022-01-01 RX ADMIN — SODIUM BICARBONATE: 84 INJECTION, SOLUTION INTRAVENOUS at 16:04

## 2022-01-01 RX ADMIN — LEVETIRACETAM 1000 MG: 10 INJECTION INTRAVENOUS at 05:57

## 2022-01-01 RX ADMIN — IOPAMIDOL 75 ML: 755 INJECTION, SOLUTION INTRAVENOUS at 11:41

## 2022-01-01 RX ADMIN — DOPAMINE HYDROCHLORIDE 4 MCG/KG/MIN: 160 INJECTION, SOLUTION INTRAVENOUS at 16:05

## 2022-01-01 RX ADMIN — SODIUM CHLORIDE 3000 MG: 900 INJECTION INTRAVENOUS at 08:05

## 2022-01-01 RX ADMIN — MIDAZOLAM HYDROCHLORIDE 2 MG: 1 INJECTION, SOLUTION INTRAMUSCULAR; INTRAVENOUS at 15:17

## 2022-01-01 RX ADMIN — SODIUM CHLORIDE 3000 MG: 900 INJECTION INTRAVENOUS at 08:17

## 2022-01-01 RX ADMIN — MIDAZOLAM HYDROCHLORIDE 2 MG: 1 INJECTION, SOLUTION INTRAMUSCULAR; INTRAVENOUS at 01:50

## 2022-01-01 RX ADMIN — SODIUM CHLORIDE, PRESERVATIVE FREE 40 MG: 5 INJECTION INTRAVENOUS at 03:32

## 2022-01-01 RX ADMIN — GLYCOPYRROLATE 0.2 MG: 0.2 INJECTION INTRAMUSCULAR; INTRAVENOUS at 17:06

## 2022-01-01 RX ADMIN — LEVETIRACETAM 1000 MG: 10 INJECTION INTRAVENOUS at 04:26

## 2022-01-01 RX ADMIN — SODIUM CHLORIDE 8 MG/HR: 9 INJECTION, SOLUTION INTRAVENOUS at 12:49

## 2022-01-01 RX ADMIN — METOCLOPRAMIDE 10 MG: 5 INJECTION, SOLUTION INTRAMUSCULAR; INTRAVENOUS at 18:42

## 2022-01-01 RX ADMIN — POTASSIUM CHLORIDE 20 MEQ: 29.8 INJECTION, SOLUTION INTRAVENOUS at 12:32

## 2022-01-01 RX ADMIN — FENTANYL CITRATE 50 MCG: 50 INJECTION, SOLUTION INTRAMUSCULAR; INTRAVENOUS at 14:10

## 2022-01-01 RX ADMIN — LORAZEPAM 0.5 MG: 2 INJECTION INTRAMUSCULAR; INTRAVENOUS at 22:42

## 2022-01-01 RX ADMIN — RIFAXIMIN 200 MG: 200 TABLET ORAL at 14:32

## 2022-01-01 RX ADMIN — LORAZEPAM 0.5 MG: 2 INJECTION INTRAMUSCULAR; INTRAVENOUS at 03:11

## 2022-01-01 RX ADMIN — DOPAMINE HYDROCHLORIDE 6 MCG/KG/MIN: 160 INJECTION, SOLUTION INTRAVENOUS at 23:31

## 2022-01-01 RX ADMIN — FUROSEMIDE 40 MG/HR: 10 INJECTION, SOLUTION INTRAMUSCULAR; INTRAVENOUS at 16:05

## 2022-01-01 RX ADMIN — MORPHINE SULFATE 5 MG: 10 INJECTION INTRAVENOUS at 08:42

## 2022-01-01 RX ADMIN — LORAZEPAM 0.5 MG: 2 INJECTION INTRAMUSCULAR; INTRAVENOUS at 14:38

## 2022-01-01 RX ADMIN — FENTANYL CITRATE 50 MCG: 50 INJECTION, SOLUTION INTRAMUSCULAR; INTRAVENOUS at 07:09

## 2022-01-01 RX ADMIN — SODIUM CHLORIDE 3000 MG: 900 INJECTION INTRAVENOUS at 20:40

## 2022-01-01 RX ADMIN — MORPHINE SULFATE 5 MG: 10 INJECTION INTRAVENOUS at 13:18

## 2022-01-01 RX ADMIN — MORPHINE SULFATE 5 MG: 10 INJECTION INTRAVENOUS at 14:10

## 2022-01-01 RX ADMIN — MORPHINE SULFATE 5 MG: 10 INJECTION INTRAVENOUS at 07:32

## 2022-01-01 RX ADMIN — MORPHINE SULFATE 5 MG: 10 INJECTION INTRAVENOUS at 09:10

## 2022-01-01 RX ADMIN — DOPAMINE HYDROCHLORIDE 4 MCG/KG/MIN: 160 INJECTION, SOLUTION INTRAVENOUS at 12:38

## 2022-01-01 RX ADMIN — AMPICILLIN SODIUM AND SULBACTAM SODIUM 3000 MG: 2; 1 INJECTION, POWDER, FOR SOLUTION INTRAMUSCULAR; INTRAVENOUS at 20:18

## 2022-01-01 RX ADMIN — LORAZEPAM 0.5 MG: 2 INJECTION INTRAMUSCULAR; INTRAVENOUS at 02:33

## 2022-01-01 RX ADMIN — INSULIN LISPRO 3 UNITS: 100 INJECTION, SOLUTION INTRAVENOUS; SUBCUTANEOUS at 23:52

## 2022-01-01 RX ADMIN — POLYETHYLENE GLYCOL 3350, SODIUM SULFATE ANHYDROUS, SODIUM BICARBONATE, SODIUM CHLORIDE, POTASSIUM CHLORIDE 4000 ML: 236; 22.74; 6.74; 5.86; 2.97 POWDER, FOR SOLUTION ORAL at 05:13

## 2022-01-01 RX ADMIN — MIDAZOLAM HYDROCHLORIDE 2 MG: 2 INJECTION, SOLUTION INTRAMUSCULAR; INTRAVENOUS at 01:50

## 2022-01-01 RX ADMIN — SODIUM CHLORIDE, PRESERVATIVE FREE 40 MG: 5 INJECTION INTRAVENOUS at 18:41

## 2022-01-01 RX ADMIN — FENTANYL CITRATE 50 MCG: 50 INJECTION, SOLUTION INTRAMUSCULAR; INTRAVENOUS at 15:17

## 2022-01-01 RX ADMIN — FUROSEMIDE 20 MG/HR: 10 INJECTION, SOLUTION INTRAMUSCULAR; INTRAVENOUS at 11:29

## 2022-01-01 RX ADMIN — FUROSEMIDE 40 MG/HR: 10 INJECTION, SOLUTION INTRAMUSCULAR; INTRAVENOUS at 08:18

## 2022-01-01 RX ADMIN — EPINEPHRINE 5 MCG/MIN: 1 INJECTION PARENTERAL at 10:34

## 2022-01-01 RX ADMIN — MORPHINE SULFATE 5 MG: 10 INJECTION INTRAVENOUS at 12:01

## 2022-01-01 RX ADMIN — FENTANYL CITRATE 50 MCG: 50 INJECTION, SOLUTION INTRAMUSCULAR; INTRAVENOUS at 20:31

## 2022-01-01 RX ADMIN — LORAZEPAM 0.5 MG: 2 INJECTION INTRAMUSCULAR; INTRAVENOUS at 12:01

## 2022-01-01 RX ADMIN — DOPAMINE HYDROCHLORIDE 1 MCG/KG/MIN: 160 INJECTION, SOLUTION INTRAVENOUS at 08:16

## 2022-01-01 RX ADMIN — MORPHINE SULFATE 5 MG: 10 INJECTION INTRAVENOUS at 04:15

## 2022-01-01 RX ADMIN — LORAZEPAM 0.5 MG: 2 INJECTION INTRAMUSCULAR; INTRAVENOUS at 04:18

## 2022-01-01 RX ADMIN — GLYCOPYRROLATE 0.2 MG: 0.2 INJECTION INTRAMUSCULAR; INTRAVENOUS at 02:21

## 2022-01-01 RX ADMIN — LORAZEPAM 0.5 MG: 2 INJECTION INTRAMUSCULAR; INTRAVENOUS at 19:11

## 2022-01-01 RX ADMIN — RIFAXIMIN 200 MG: 200 TABLET ORAL at 19:24

## 2022-01-01 RX ADMIN — DOPAMINE HYDROCHLORIDE 5 MCG/KG/MIN: 160 INJECTION, SOLUTION INTRAVENOUS at 15:07

## 2022-01-01 RX ADMIN — AMPICILLIN SODIUM AND SULBACTAM SODIUM 3000 MG: 2; 1 INJECTION, POWDER, FOR SOLUTION INTRAMUSCULAR; INTRAVENOUS at 08:14

## 2022-01-01 RX ADMIN — INSULIN LISPRO 6 UNITS: 100 INJECTION, SOLUTION INTRAVENOUS; SUBCUTANEOUS at 16:32

## 2022-01-01 RX ADMIN — RIFAXIMIN 200 MG: 200 TABLET ORAL at 20:17

## 2022-01-01 RX ADMIN — MORPHINE SULFATE 2 MG: 2 INJECTION, SOLUTION INTRAMUSCULAR; INTRAVENOUS at 01:11

## 2022-01-01 RX ADMIN — MORPHINE SULFATE 4 MG: 4 INJECTION INTRAVENOUS at 03:07

## 2022-01-01 RX ADMIN — SODIUM CHLORIDE: 9 INJECTION, SOLUTION INTRAVENOUS at 11:02

## 2022-01-01 RX ADMIN — SODIUM CHLORIDE 1000 ML: 9 INJECTION, SOLUTION INTRAVENOUS at 10:45

## 2022-01-01 RX ADMIN — RIFAXIMIN 200 MG: 200 TABLET ORAL at 18:40

## 2022-01-01 RX ADMIN — MORPHINE SULFATE 4 MG: 4 INJECTION INTRAVENOUS at 02:46

## 2022-01-01 RX ADMIN — LORAZEPAM 0.5 MG: 2 INJECTION INTRAMUSCULAR; INTRAVENOUS at 03:38

## 2022-01-01 RX ADMIN — LORAZEPAM 0.5 MG: 2 INJECTION INTRAMUSCULAR; INTRAVENOUS at 14:10

## 2022-01-01 RX ADMIN — SODIUM CHLORIDE, PRESERVATIVE FREE 40 MG: 5 INJECTION INTRAVENOUS at 03:10

## 2022-01-01 RX ADMIN — LORAZEPAM 0.5 MG: 2 INJECTION INTRAMUSCULAR; INTRAVENOUS at 23:30

## 2022-01-01 RX ADMIN — Medication 2 MG/HR: at 09:13

## 2022-01-01 RX ADMIN — MORPHINE SULFATE 2 MG: 2 INJECTION, SOLUTION INTRAMUSCULAR; INTRAVENOUS at 17:06

## 2022-01-01 RX ADMIN — SODIUM CHLORIDE 80 MG: 9 INJECTION, SOLUTION INTRAVENOUS at 11:53

## 2022-01-01 RX ADMIN — LORAZEPAM 0.5 MG: 2 INJECTION INTRAMUSCULAR; INTRAVENOUS at 08:43

## 2022-01-01 RX ADMIN — SODIUM BICARBONATE: 84 INJECTION, SOLUTION INTRAVENOUS at 19:57

## 2022-01-01 RX ADMIN — LORAZEPAM 0.5 MG: 2 INJECTION INTRAMUSCULAR; INTRAVENOUS at 19:42

## 2022-01-01 RX ADMIN — MIDAZOLAM HYDROCHLORIDE 10 MG: 1 INJECTION, SOLUTION INTRAMUSCULAR; INTRAVENOUS at 11:12

## 2022-01-01 RX ADMIN — LORAZEPAM 0.5 MG: 2 INJECTION INTRAMUSCULAR; INTRAVENOUS at 10:36

## 2022-01-01 RX ADMIN — LORAZEPAM 0.5 MG: 2 INJECTION INTRAMUSCULAR; INTRAVENOUS at 09:10

## 2022-01-01 RX ADMIN — LORAZEPAM 0.5 MG: 2 INJECTION INTRAMUSCULAR; INTRAVENOUS at 09:49

## 2022-01-01 RX ADMIN — LORAZEPAM 0.5 MG: 2 INJECTION INTRAMUSCULAR; INTRAVENOUS at 00:50

## 2022-01-01 RX ADMIN — SODIUM CHLORIDE, PRESERVATIVE FREE 40 MG: 5 INJECTION INTRAVENOUS at 03:33

## 2022-01-01 RX ADMIN — LORAZEPAM 0.5 MG: 2 INJECTION INTRAMUSCULAR; INTRAVENOUS at 13:18

## 2022-01-01 RX ADMIN — MORPHINE SULFATE 2 MG: 2 INJECTION, SOLUTION INTRAMUSCULAR; INTRAVENOUS at 18:18

## 2022-01-01 RX ADMIN — MORPHINE SULFATE 2 MG: 2 INJECTION, SOLUTION INTRAMUSCULAR; INTRAVENOUS at 19:42

## 2022-01-01 RX ADMIN — MORPHINE SULFATE 5 MG: 10 INJECTION INTRAVENOUS at 14:38

## 2022-01-01 RX ADMIN — Medication 50 MCG/HR: at 23:04

## 2022-01-01 RX ADMIN — SODIUM CHLORIDE 3000 MG: 900 INJECTION INTRAVENOUS at 19:24

## 2022-01-01 RX ADMIN — RIFAXIMIN 200 MG: 200 TABLET ORAL at 08:14

## 2022-01-01 RX ADMIN — RIFAXIMIN 200 MG: 200 TABLET ORAL at 08:09

## 2022-01-01 RX ADMIN — LIDOCAINE HYDROCHLORIDE 15 ML: 20 SOLUTION ORAL; TOPICAL at 03:28

## 2022-01-01 RX ADMIN — MORPHINE SULFATE 2 MG: 2 INJECTION, SOLUTION INTRAMUSCULAR; INTRAVENOUS at 20:14

## 2022-01-01 ASSESSMENT — PULMONARY FUNCTION TESTS
PIF_VALUE: 38
PIF_VALUE: 34
PIF_VALUE: 27
PIF_VALUE: 40
PIF_VALUE: 38
PIF_VALUE: 43
PIF_VALUE: 30
PIF_VALUE: 34
PIF_VALUE: 35
PIF_VALUE: 35
PIF_VALUE: 28
PIF_VALUE: 34
PIF_VALUE: 29
PIF_VALUE: 31
PIF_VALUE: 27
PIF_VALUE: 36
PIF_VALUE: 22
PIF_VALUE: 36
PIF_VALUE: 39
PIF_VALUE: 30
PIF_VALUE: 35
PIF_VALUE: 39
PIF_VALUE: 26
PIF_VALUE: 34
PIF_VALUE: 28
PIF_VALUE: 37
PIF_VALUE: 41
PIF_VALUE: 36
PIF_VALUE: 29
PIF_VALUE: 35
PIF_VALUE: 38
PIF_VALUE: 41
PIF_VALUE: 25
PIF_VALUE: 34
PIF_VALUE: 25
PIF_VALUE: 25
PIF_VALUE: 45
PIF_VALUE: 36
PIF_VALUE: 28
PIF_VALUE: 44
PIF_VALUE: 43
PIF_VALUE: 28
PIF_VALUE: 33
PIF_VALUE: 39
PIF_VALUE: 28
PIF_VALUE: 38
PIF_VALUE: 22
PIF_VALUE: 19
PIF_VALUE: 35
PIF_VALUE: 38
PIF_VALUE: 45
PIF_VALUE: 26
PIF_VALUE: 34
PIF_VALUE: 42
PIF_VALUE: 34
PIF_VALUE: 22
PIF_VALUE: 43
PIF_VALUE: 46
PIF_VALUE: 35
PIF_VALUE: 31
PIF_VALUE: 20
PIF_VALUE: 37
PIF_VALUE: 35
PIF_VALUE: 45
PIF_VALUE: 38
PIF_VALUE: 36
PIF_VALUE: 35
PIF_VALUE: 36
PIF_VALUE: 40
PIF_VALUE: 31
PIF_VALUE: 26

## 2022-01-01 ASSESSMENT — ENCOUNTER SYMPTOMS: TACHYPNEA: 1

## 2022-03-28 PROBLEM — I46.9 CARDIAC ARREST (HCC): Status: ACTIVE | Noted: 2022-01-01

## 2022-03-28 NOTE — H&P
Critical Care - History and Physical Examination    Patient's name:  El Arango  Medical Record Number: [id number]  Patient's account/billing number: [de-identified]  Patient's YOB: 1955  Age: 78 y.o. Date of Admission: 3/28/2022 10:29 AM  Date of History and Physical Examination: 3/28/2022    Primary Care Physician: Dennis Mcelroy  Attending Physician: Analisa Hurt MD     Code Status: Full Code    Chief complaint:   Chief Complaint   Patient presents with    Cardiac Arrest       HISTORY OF PRESENT ILLNESS:   El Arango is a 78 y.o. male with a history of alcohol use and hypertension, presented to the emergency department by EMS as a post arrest. Patient goes to the grocery store daily and today others noticed that he appeared ill. He was reported to have collapsed to the ground. Down time per EMS was about 40 minutes. He was found prone and unresponsive. No bystander CPR was done till EMS arrived. Patient was found to be in PEA arrest. I gel was used to established airway. He was given 4 rounds of epinephrine and 30 minutes of CPR before arrival. Had ROSC on arrival. He then became bradycardic and CPR was once again initiated. ROSC was achieved with one round of CPR and 1 mg of epinephrine. He then had a normal heart rate and strong pulses. He was started on an epinephrine drip at 5mcg/min. Intubated and found to have bilateral breath sounds. He was found to have bilateral leg edema that is symmetrical, distended abdomen, good cardiac contractility, no obvious RV dilation, no pericardial effusion. OG was placed with brown/pink return. POC Blood gas showed a pH of 6.7 and CO2 of 95. Patient lost pulses again after becoming bradycardic and received one more round of CPR. His IV access was lost and a right tibial IO was placed. He was given 1 amp of bicarb. ROSC was once again obtained.  Hemoglobin was found to be 5.6, patient was given one unit of blood CT head, neck, CTA chest abdomen pelvis were ordered. He was also noted to have bright red blood per rectum. CXR showed bilateral airspace disease that may represent inflammatory process or multifocal infection. CT head was unremarkable. Ct cervical showed pleural effusions in the apices of the lungs. CTA chest showed traumatic nondisplaced rib fractures and a traumatic nondisplaced fracture of the body of the sternum. A substernal hematoma with small focus of active extravasation of contrast suggesting arterial bleed and multiple hematomas in the subpleural space with active extravasation suggesting bleeding from the right internal thoracic artery. Ground glass opacities suggesting pulmonary contusions with upper abdominal ascites. Past Medical History:        Diagnosis Date    Hypertension        Past Surgical History:  History reviewed. No pertinent surgical history. Allergies:    No Known Allergies      Home Meds:   Prior to Admission medications    Medication Sig Start Date End Date Taking? Authorizing Provider   metoprolol succinate (TOPROL XL) 50 MG extended release tablet Take 200 mg by mouth daily     Historical Provider, MD       Social History:   TOBACCO:   reports that he has never smoked. He has never used smokeless tobacco.  ETOH:   reports current alcohol use of about 6.0 standard drinks of alcohol per week. DRUGS:  reports no history of drug use. OCCUPATION:      Family History:   History reviewed. No pertinent family history. REVIEW OF SYSTEMS (ROS):  Could not be evaluated      Physical Exam:    Vitals: BP (!) 162/53   Pulse 51   Temp 92.7 °F (33.7 °C)   Resp 27   Wt 244 lb 11.4 oz (111 kg)   SpO2 96%   BMI 37.21 kg/m²     Last Body weight:   Wt Readings from Last 3 Encounters:   03/28/22 244 lb 11.4 oz (111 kg)   12/02/17 180 lb (81.6 kg)       Body Mass Index : Body mass index is 37.21 kg/m².       PHYSICAL EXAMINATION :  Constitutional: Intubated, sedated  EENT: Pinpoint pupils, sclera clear, anicteric, oropharynx clear, no lesions, neck supple with midline trachea. Neck: Supple, symmetrical, no adenopathy, no jvd skin normal  Respiratory: Mechanical breath sounds. Bruseing and deformity to the ribs  Cardiovascular: regular rate and rhythm, normal S1, S2, no murmur noted and 2+ pulses throughout  Abdomen: soft, nontender, distended, no masses or organomegaly  Extremities:  peripheral pulses normal, no pedal edema, no clubbing or cyanosis    MEDICATIONS:  Scheduled Meds:   insulin lispro  0-18 Units SubCUTAneous Q4H    insulin lispro  0-9 Units SubCUTAneous Nightly    ampicillin-sulbactam  3,000 mg IntraVENous Q6H       Continuous Infusions:   EPINEPHrine infusion (NON-WEIGHT-BASED) 20 mcg/min (03/28/22 1241)    sodium bicarbonate infusion 50 mL/hr at 03/28/22 1104    pantoprazole 8 mg/hr (03/28/22 1249)    propofol 10 mcg/kg/min (03/28/22 1231)    dextrose      octreotide (SandoSTATIN) infusion 25 mcg/hr (03/28/22 1332)    DOPamine 5 mcg/kg/min (03/28/22 1507)       PRN Meds:   ondansetron, 4 mg, Q8H PRN   Or  ondansetron, 4 mg, Q6H PRN  polyethylene glycol, 17 g, Daily PRN  glucose, 15 g, PRN  dextrose, 12.5 g, PRN  glucagon (rDNA), 1 mg, PRN  dextrose, 100 mL/hr, PRN        SUPPORT DEVICES: [x] Ventilator [] BIPAP  [] Nasal Cannula [] Room Air    VENT SETTINGS (Comprehensive) (if applicable):   PRVC mode, FiO2 100%, PEEP 8, Respiratory Rate 20, Tidal Volume 560  Vent Information  $Ventilation: $Initial Day  Skin Assessment: Clean, dry, & intact  Vent Type: Servo i  Vent Mode: PRVC  Vt Ordered: 560 mL  Rate Set: 20 bmp  FiO2 : 100 %  SpO2: 96 %  SpO2/FiO2 ratio: 96  Sensitivity: 3  PEEP/CPAP: 8  I Time/ I Time %: 0.7 s  Humidification Source: HME  Additional Respiratory  Assessments  Pulse: 51  Resp: 27  SpO2: 96 %  End Tidal CO2: 21 (%)  Position: Semi-Pennington's  Humidification Source: HME  Oral Care Completed?: Yes  Oral Care: Mouthwash,Mouth suctioned  Subglottic Suction Done?: Yes  No results found for: IFIO2, MODE, SETTIDVOL, SETPEEP    LABS:   Venous Blood Gas result:  pH 6.850; pCO2 78.2; pO2 92.2; HCO3 12.9; BE19; %O2 Sat 85.9. No results found for: PH, PCO2, PO2, HCO3, O2SAT     CBC:   Recent Labs     03/28/22  1042   WBC 12.8*   HGB 5.6*        BMP:    Recent Labs     03/28/22  1042 03/28/22  1042 03/28/22  1148 03/28/22  1404      < >  --  134*   K 4.1   < >  --  3.5*   *   < >  --  104   CO2 12*   < >  --  10*   BUN 12   < >  --  13   CREATININE 0.96  --  1.19 1.12   GLUCOSE 130*   < >  --  195*    < > = values in this interval not displayed. S. Calcium:  Recent Labs     03/28/22  1404   CALCIUM 7.5*     S. Ionized Calcium:No results for input(s): IONCA in the last 72 hours. S. Magnesium:No results for input(s): MG in the last 72 hours. S. Phosphorus:No results for input(s): PHOS in the last 72 hours. S. Glucose:  Recent Labs     03/28/22  1036 03/28/22  1148 03/28/22  1424   POCGLU 130* 131* 180*     Glycosylated hemoglobin A1C: No results for input(s): LABA1C in the last 72 hours. INR:   Recent Labs     03/28/22  1404   INR 2.0     Hepatic functions:   Recent Labs     03/28/22  1404   ALKPHOS 99   ALT 35   AST 88*   PROT 5.7*   BILITOT 1.34*   BILIDIR 0.87*   LABALBU 2.5*     Pancreatic functions:No results for input(s): LACTA, AMYLASE in the last 72 hours. S. Lactic Acid: No results for input(s): LACTA in the last 72 hours. Cardiac enzymes:No results for input(s): CKTOTAL, CKMB, CKMBINDEX, TROPONINI in the last 72 hours. BNP:No results for input(s): BNP in the last 72 hours. Lipid profile: No results for input(s): CHOL, TRIG, HDL, LDL, LDLCALC in the last 72 hours.   Blood Gases: No results found for: PH, PCO2, PO2, HCO3, O2SAT  Thyroid functions: No results found for: T4, TSH     Urinalysis:   Recent Labs     03/28/22  1240   COLORU Dark Yellow*   PHUR 5.5   WBCUA 10 TO 20   RBCUA 2 TO 5   SPECGRAV 1.022   LEUKOCYTESUR TRACE*   UROBILINOGEN Normal   BILIRUBINUR NEGATIVE RADIOLOGY:  CTA CHEST W WO CONTRAST   Final Result   Acute traumatic nondisplaced to minimally displaced fractures of the right   anterior 3rd, 4th, 7th and 8th ribs, left anterior 2nd, 3rd, 4th, 5th, 6th   and 7th ribs as well as the right anterior 4th costochondral cartilage. Acute traumatic nondisplaced fracture of the body of the sternum. Substernal hematoma with small focus of active extravasation of contrast   suggesting arterial bleeding. Hematomas in the subpleural space deep to the right costochondral cartilage   fractures and anterior rib fractures with foci of active extravasation of   contrast suggesting active bleeding from the right internal thoracic artery. Bilateral pleural effusions. Right perihilar upper lobe and lower lobe ground-glass opacities suggesting   pulmonary contusions. No pneumothorax. No acute traumatic injury of the thoracic aorta. Upper abdominal ascites. CT Head WO Contrast   Final Result   No acute intracranial abnormality. CT Cervical Spine WO Contrast   Final Result   No acute traumatic injury of the cervical spine. Mild multilevel   degenerative changes. Pleural effusions in the lung apices. XR CHEST PORTABLE   Final Result   1. Endotracheal tube terminates 2 cm above the matthew. Enteric tube   terminates at the body of the stomach. 2.  Bilateral airspace disease, which may represent inflammatory process or   multifocal infection. CTA ABDOMEN PELVIS W CONTRAST    (Results Pending)       CARDIOLOGY:  Recent Cardiac Catheterization summary:   No results found for this or any previous visit. Last Echocardiogram findings:   No results found for this or any previous visit. No results found for this or any previous visit.       Assessment and Plan     Patient Active Problem List   Diagnosis    Alcohol intoxication in alcoholism with blood level over 0.3 without complication ([address]    Cardiac arrest [address] [address]       PLAN/MEDICAL DECISION MAKING:  Neurologic:   · CT head and neck unremarkable  · Neuro checks per protocol  · propofol d/c  · Palliative care consulted  · Neuro crit care consult    Cardiovascular:  · Hemodynamically stable  · MAP goal 65  · D/c epinephrine drip, start dopamine  · Add levophed if needed  · Trend trops   · Repeat HH  · F/u echo  · F/u repeat CT chest  · Coags elevated   · Cardiology consult  · CT surgery consult  · ENT consult, no packing needed at this time    Pulmonary:  · Maintain oxygen sats >92%  · Pulmonary toilet  Vent Information  $Ventilation: $Initial Day  Skin Assessment: Clean, dry, & intact  Vent Type: Servo i  Vent Mode: PRVC  Vt Ordered: 560 mL  Rate Set: 20 bmp  FiO2 : 100 %  SpO2: 96 %  SpO2/FiO2 ratio: 96  Sensitivity: 3  PEEP/CPAP: 8  I Time/ I Time %: 0.7 s  Humidification Source: HME   F/u abg     GI/Nutrition  · Protonix bolus and gtt  D/c  · Octreotide gtt d/c   · F/u lactic acid   · Elevated ammonia, start rifaximin   · Ulcer Prophylaxis: PPI BID  · Diet:Diet NPO   · GI consult, active retropharyngeal bleeding seen on EGD     Renal/Fluid/Electrolyte  · Bicarb drip   ·   ID  WBC:   Lab Results   Component Value Date    WBC 12.8 (H) 2022     Tmax: Temp (24hrs), Av.7 °F (33.7 °C), Min:92.7 °F (33.7 °C), Max:92.7 °F (33.7 °C)  · Unasyn   Hematology:  Recent Labs     22  1042   HGB 5.6*   ·  2 units PRBC given  Endocrine:   glucose controlled - most recent BGL is   Recent Labs     22  1042 22  1404   GLUCOSE 130* 195*     DVT Prophylaxis  · SCD sleeves -thigh high  Discharge Needs:  PT, OT, ST, SW and Case Management            CODE STATUS: Full Code    DISPOSITION:  [x] To remain ICU: Intubated    [] OK for out of ICU from [address] [place] [address], MD  Emergency Medicine Resident  Critical Care Service  :11 PM

## 2022-03-28 NOTE — FLOWSHEET NOTE
[facility] [address]     Emergency/Trauma Note    PATIENT NAME: Patti Parker    Shift date: 3/28/2022    Shift day: Monday   Shift # 1    Room # 13/13   Name: Patti Parker            Age: 78 y.o. Gender: male          Alevism: None   Place of Pentecostal:     Trauma/Incident type: Full Arrest  Admit Date & Time: 3/28/2022 10:29 AM  TRAUMA NAME:     ADVANCE DIRECTIVES IN CHART? No    NAME OF DECISION MAKER:     RELATIONSHIP OF DECISION MAKER TO PATIENT:     PATIENT/EVENT DESCRIPTION:  Patti Parker is a 78 y.o. male who arrived via (transport) from (scene/accident/event) as a (type/level of trauma). (Description of injuries/condition/event). Pt to be admitted to 13/13. SPIRITUAL ASSESSMENT/INTERVENTION:  Pt was was unresponsive upon entering, no spiritual assessment was made. Chaplains (Patience Clement) spoke with many family members to connect with next of kin. [facility] - [phone])       PATIENT BELONGINGS:  With patient    ANY BELONGINGS OF SIGNIFICANT VALUE NOTED:  Money clip with $45 ( cash)  Id, medical cards, etc.    REGISTRATION STAFF NOTIFIED? Yes      WHAT IS YOUR SPIRITUAL CARE PLAN FOR THIS PATIENT?:  Chaplains Will wait for the arrival of family members. Lidya Aaron and Lizeth Hancock ([phone])  Chaplains will remain available to offer spiritual and emotional support as needed.         Electronically signed by Oxana Hensley, on 3/28/2022 at 11:58 AM.  [place]  [phone]       03/28/22 1154   Encounter Summary   Services provided to: Family   Referral/Consult From: Multi-disciplinary team   Support System Family members   Continue Visiting   (3/28/22)   Complexity of Encounter High   Length of Encounter 2 hours   Spiritual Assessment Completed   (No, unable to respond- connecting with family)   Crisis   Type Code   Assessment Unable to respond   Intervention Sustaining presence/ Ministry of presence Outcome Did not respond  (Connected with family.  En route)

## 2022-03-28 NOTE — CONSULTS
CONSULTING SERVICE: Otolaryngology-Head and Neck Surgery    REASON FOR CONSULT:  El Arango is a 78 y.o. male seen today for   Chief Complaint   Patient presents with    Cardiac Arrest       HISTORY OF PRESENT ILLNESS:   77 yo male with history of ETOH abuse found in cardiac arrest with multiple CPR and intubated with oropharyngeal bleeding without obvious source  EGD did not reveal varices or obvious lesion but some concern for pharyngeal/retropharyngeal bleeding    REVIEW OF SYSTEMS:   Intubated  No family at bedside    PAST HISTORY:   Past Medical History:   Diagnosis Date    Hypertension      History reviewed. No pertinent surgical history. History reviewed. No pertinent family history.       Social Connections:     Frequency of Communication with Friends and Family: Not on file    Frequency of Social Gatherings with Friends and Family: Not on file    Attends Jainism Services: Not on file    Active Member of Clubs or Organizations: Not on file    Attends Club or Organization Meetings: Not on file    Marital Status: Not on file        MEDICATIONS:   Current Facility-Administered Medications   Medication Dose Route Frequency Provider Last Rate Last Admin    sodium bicarbonate 150 mEq in dextrose 5 % 1,000 mL infusion   IntraVENous Continuous Arroyo Ailyn, DO 50 mL/hr at 03/28/22 1104 New Bag at 03/28/22 1104    ondansetron (ZOFRAN-ODT) disintegrating tablet 4 mg  4 mg Oral Q8H PRN Analisa Hurt MD        Or    ondansetron (ZOFRAN) injection 4 mg  4 mg IntraVENous Q6H PRN Analisa Hurt MD        polyethylene glycol (GLYCOLAX) packet 17 g  17 g Oral Daily PRN Analisa Hurt MD        insulin lispro (HUMALOG) injection vial 0-18 Units  0-18 Units SubCUTAneous Q4H Eben Sims MD   6 Units at 03/28/22 1632    glucose (GLUTOSE) 40 % oral gel 15 g  15 g Oral PRN Analisa Hurt MD        dextrose 50 % IV solution  12.5 g IntraVENous PRN Analisa Hurt MD        glucagon (rDNA) injection 1 mg  1 mg IntraMUSCular PRN Delonte Monroy MD        dextrose 5 % solution  100 mL/hr IntraVENous PRN Delonte Monroy MD        ampicillin-sulbactam (UNASYN) 3000 mg ivpb minibag  3,000 mg IntraVENous Q6H Blake Potts MD   Stopped at 03/28/22 1556    DOPamine (INTROPIN) 400 mg in dextrose 5 % 250 mL infusion  1-20 mcg/kg/min IntraVENous Continuous Blake Potts MD 20.8 mL/hr at 03/28/22 1507 5 mcg/kg/min at 03/28/22 1507    metoclopramide (REGLAN) injection 10 mg  10 mg IntraVENous Once Jason Liu MD        fentaNYL (SUBLIMAZE) 100 MCG/2ML injection             pantoprazole (PROTONIX) 40 mg in sodium chloride (PF) 10 mL injection  40 mg IntraVENous Q12H Rosalia Chen MD        potassium chloride 20 mEq/50 mL IVPB (Central Line)  20 mEq IntraVENous PRN Blake Potts MD        rifaximin Marygovinde Hey) tablet 200 mg  200 mg Per NG tube TID Blake Potts MD        midazolam (VERSED) 1 mg/mL in D5W infusion  1-10 mg/hr IntraVENous Continuous Blake Potts MD        levETIRAcetam (KEPPRA) 4,000 mg in sodium chloride 0.9 % 100 mL IVPB  4,000 mg IntraVENous Once Levonne America, MD Ardyth Moritz [START ON 3/29/2022] levETIRAcetam (KEPPRA) 1000 mg/100 mL IVPB  1,000 mg IntraVENous Q12H Devang Justin MD        polyethylene glycol (GoLYTELY) solution 4,000 mL  4,000 mL Oral Once KATIE Aguilera CNP        bisacodyl (DULCOLAX) EC tablet 20 mg  20 mg Oral Once KATIE Delgado CNP            ALLERGIES:   No Known Allergies     PHYSICAL EXAM:  Vitals:    03/28/22 1630 03/28/22 1645 03/28/22 1700 03/28/22 1756   BP: (!) 76/41 (!) 61/37 (!) 102/56    Pulse: 73 69 66 63   Resp: 27 27 26 26   Temp:       TempSrc:       SpO2: 100% 100% 98% 100%   Weight:       Height:          GENERAL:Intubated on vent, mild bleeding from oropharynx    MOUTH/THROAT: poor dentition, several areas of trauma on tongue, dark blood suctioned without any obvious source, no active bleeding noted  NECK: no mass and no focal lymphadenopathy  RESPIRATORY: on vent  NEUROLOGICAL:  No response    RELEVANT LABS/STUDIES:  See chart    IMPRESSION:  Acute upper airway bleeding without obvious source      RECOMMENDATIONS/PLAN:  No intervention at this time as patient prognosis is guarded and not active bleeding noted on exam. Oropharyngeal and nasal pack is reasonable if necessary and active bleed noted    --------------------------------------------------  Siri Phan MD  Pediatric Otolaryngology-Head and Neck Surgery  [address] [place] [address] Otolaryngology group    Office  ph# [phone]    Also available in [facility]

## 2022-03-28 NOTE — PROCEDURES
PROCEDURE NOTE - ARTERIAL LINE PLACEMENT    PATIENT NAME: Dick Hollingsworth  MEDICAL RECORD NO. [id number]  DATE: 3/28/2022  ATTENDING PHYSICIAN:  Dr. Sita Aquino DIAGNOSIS:  Need for blood pressure monitoring  POSTOPERATIVE DIAGNOSIS:  Same  PROCEDURE PERFORMED: Right Radial Arterial Line Insertion  PERFORMING PHYSICIAN:  Katie Mendoza MD  ESTIMATED BLOOD LOSS:  Less than 25 ml  COMPLICATIONS:  None immediately appreciated. DISCUSSION:  Dick Hollingsworth is a 78 y.o. male who requires invasive pressure monitoring. The history and physical examination were reviewed and confirmed. CONSENT: Unable to be obtained due to patient's condition. PROCEDURE:  A timeout was initiated by the bedside nurse and was confirmed by those present. The patient was placed in a supine position. The skin overlying the Right Radial was prepped with chlorhexadine. Through this region, the introducer needle through catheter was inserted into the right radial artery until pulsatile bright blood was seen in collection tubing. Guidewire was advanced with no resistance. Catheter was advanced into the artery, wire was pulled with brisk bleeding noted. Pressure monitoring setup was connected to the catheter, it aspirated and flushed easily. The catheter was secured to the wrist with 3-0 silk. No immediate complication was evident. All sponge, instrument and needle counts were correct at the completion of the procedure. Katie Mendoza MD  1:49 PM, 3/28/22  Attending Physician Statement  I have discussed the care of Dick Hollingsworth, including pertinent history and exam findings,  with the resident. I have seen and examined the patient and the key elements of all parts of the encounter have been performed by me. I agree with the assessment, plan and orders as documented by the resident with additions . Treatment plan Discussed with nursing staff in detail , all questions answered .    Electronically signed by Mango May MD JACKSON on   3/28/22 at 3:00 PM EDT    Please note that this chart was generated using voice recognition Dragon dictation software. Although every effort was made to ensure the accuracy of this automated transcription, some errors in transcription may have occurred.

## 2022-03-28 NOTE — CONSULTS
[facility]  History and Physical    Pt Name: Anthony Landrum  MRN: [id number]  [place]: 1955  Date of evaluation: 3/28/2022  Primary Care Physician: Asim Restrepo  Patient evaluated at the request of  Dr. Fernando Bolaños  Reason for evaluation: retrosternal hemotoma    History of Present Illness:  Taken from chart and nurse     Anthony Landrum is a 78y.o. year old, ,  male who was found down at grocery store. Unwitnessed. EMS called CPR not started til EMS arrived, patient was PEA. Patient resuscitated with >30 minutes of CPR and epi. Coded 2 more times in ER. We've been asked CT scan chest showed retrosternal hematoma with questionable bleed from [address] We've been asked to evaluate for questionable bleed from [address] with retrosternal hematoma. I'm seeing the patient in concert with Dr. Tomasa Schwartz. All films and records available have been reviewed. Review of Systems:     Unable to obtain due to intubated and sedated    Past Medical History         Diagnosis Date    Hypertension        Past Surgical History     History reviewed. No pertinent surgical history. Medications     Prior to Admission medications    Medication Sig Start Date End Date Taking? Authorizing Provider   metoprolol succinate (TOPROL XL) 50 MG extended release tablet Take 200 mg by mouth daily     Historical Provider, MD        Allergies     has No Known Allergies. Family History     family history is not on file. Social History      reports that he has never smoked. He has never used smokeless tobacco.   reports current alcohol use of about 6.0 standard drinks of alcohol per week. reports no history of drug use. OBJECTIVE:     VITALS:  weight is 244 lb 11.4 oz (111 kg). His temperature is 92.7 °F (33.7 °C). His blood pressure is 162/53 (abnormal) and his pulse is 51. His respiration is 27 and oxygen saturation is 96%.    CONSTITUTIONAL: intubated sedated  HEENT: Head is normocephalic, atraumatic  NECK: collar  LUNGS: diminished mv breath sounds  CARDIOVASCULAR: sinus  ABDOMEN: obese soft Scars are consistent with previous surgical history. LABS:     Recent Labs     03/28/22  1042 03/28/22  1148 03/28/22  1240 03/28/22  1404   WBC 12.8*  --   --   --    HGB 5.6*  --   --   --    HCT 20.0*  --   --   --      --   --   --      --   --  134*   K 4.1  --   --  3.5*   *  --   --  104   CO2 12*  --   --  10*   BUN 12  --   --  13   CREATININE 0.96 1.19  --  1.12   MG  --   --   --  2.2   CALCIUM 8.0*  --   --  7.5*   INR 1.6  --   --  2.0   AST 38  --   --  88*   ALT 16  --   --  35   BILITOT 0.89  --   --  1.34*   BILIDIR  --   --   --  0.87*   NITRU  --   --  NEGATIVE  --    COLORU  --   --  Dark Yellow*  --      Recent Labs     03/28/22  1404   ALKPHOS 99   ALT 35   AST 88*   BILITOT 1.34*   BILIDIR 0.87*   LABALBU 2.5*       RADIOLOGY:     CTA scan chest:  Acute traumatic nondisplaced to minimally displaced fractures of the right anterior 3rd, 4th, 7th and 8th ribs, left anterior 2nd, 3rd, 4th, 5th, 6th and 7th ribs as well as the right anterior 4th costochondral cartilage. Acute traumatic nondisplaced fracture of the body of the sternum. Substernal hematoma with small focus of active extravasation of contrast suggesting arterial bleeding. Hematomas in the subpleural space deep to the right costochondral cartilage fractures and anterior rib fractures with foci of active extravasation of contrast suggesting active bleeding from the right internal thoracic artery. Bilateral pleural effusions. Right perihilar upper lobe and lower lobe ground-glass opacities suggesting pulmonary contusions. No pneumothorax. No acute traumatic injury of the thoracic aorta. Assessment:     Retrosternal Hematoma S/P CPR > 30 minutes    Plan:     Okay to proceed with EGD.  Serial Hgb and CT scan chest    Agree with the above  Spent 30 minutes examining the patient  Patient noted to have cardiac arrest brought in after CPR  Patient nonresponsive intubated with ongoing support  Vital signs stable   heart regular with PVCs   lungs rhonchorous   abdomen obese   neurologically sedated    Plan to repeat CT of chest in 3 to 4 days  GI may do scope  Repeat CBC and chemistry  Guarded prognosis  Do appreciate the consult and happy to assist in the patient's care    JAVED Longoria PA-C  Electronically signed 3/28/2022 at 3:55 PM

## 2022-03-28 NOTE — ED NOTES
Pt. Still being ventillated by bag. ET tube in place.  Pt pulse of [facility]  03/28/22 1037

## 2022-03-28 NOTE — FLOWSHEET NOTE
SPIRITUAL CARE DEPARTMENT - [facility]  PROGRESS NOTE    Shift date: 3/28/2022  Shift day: Monday   Shift # 1    Room # 0119/0119-01   Name: Jez Lorenzana            Age: 78 y.o. Gender: male          Yazidi: [employer]: Brother states that he may be an inactive member of [employer]    Referral: Family needed assistance to ICU. Admit Date & Time: 3/28/2022 10:29 AM    PATIENT/EVENT DESCRIPTION:  Jez Lorenzana is a 78 y.o. male who suffered an arrest this morning. Patient has 7 siblings of which only a few live locally. Brother Rochelle Beal is the spokesperson for the siblings. Patient's step-son lives with patient. SPIRITUAL ASSESSMENT/INTERVENTION:  Assessment: Present were patient's brothers, Rochelle Beal and Jose Cruz Lee, sister, [facility], and step-son, Carina Nuñez. All were fairly calm. Rochelle Beal expressed concern that brother, Jose Cruz Lee, would \"hang around as long as he can\" and \"cause problems\". He expressed a hope that Russel's visiting time would be limited. The two brothers and step-son went to patient's room. Rochelle Beal stated that he and patient had been very close at one time. Rochelle Co also stated that patient was Worship and had been involved at [address] [place] [address] and school when his son attended. Patient's son and wife  in  and , respectively. Rochelle Co feels that patient has really struggled since then. Intervention:  walked family to ICU waiting room and notified nurse that they were present.  provided support as they visited patient, including anticipatory grief support to Rochelle Beal. Per request of brother,  made referral for  to anoint patient tomorrow morning. Outcome: Family expressed their appreciation for support. Rochelle Beal was able to reminisce and express feelings about patient. SPIRITUAL CARE FOLLOW-UP PLAN:  Chaplains will remain available to offer spiritual and emotional support as needed.       Electronically signed by Neo Flores on 3/28/2022 at 3:35 PM.  [address] [employer]  [phone]       03/28/22 1533   Encounter Summary   Services provided to: Family   Referral/Consult From: Other    Continue Visiting   (3/28/22)   Complexity of Encounter Moderate   Length of Encounter 30 minutes   Routine   Type Follow up   Assessment Approachable;Coping   Intervention Active listening;Explored feelings, thoughts, concerns; Discussed illness/injury and it's impact; Discussed belief system/[employer] practices/quintin   Outcome Expressed gratitude;Comfort;Engaged in conversation;Expressed feelings/needs/concerns; Shared reminiscences

## 2022-03-28 NOTE — ED NOTES
1 of epi given IV     Blaire Banda RN  03/28/22 [facility]  03/28/22 0906

## 2022-03-28 NOTE — PROGRESS NOTES
[facility]  Documentation Note                Admission Dx: Cardiac arrest [facility]) [I46.9]    Past Medical History:   has a past medical history of Hypertension. Previous Testing:     ECHO 10/12/16: EF 55-60%, mild MR. Previous office/hospital visit:   None? ?      Joseph Dailey [facility]

## 2022-03-28 NOTE — PROCEDURES
Central Line Placement Procedure Note    Performed by: Margy Benton MD,     Indication: centrally administered medications    Consent: Unable to be obtained due to patient's condition. Time out performed: Immediately prior to the procedure a \"time out\" was called to verify the correct patient, the correct procedure, equipment, support staff and site/side marked as required. All elements of maximal sterile barrier techniques were followed. Procedure: The patient was positioned appropriately and the skin over the left femoral vein was prepped with betadine and draped in a sterile fashion. Local anesthesia was not performed due to the emergent nature of this procedure. A large bore needle was used to identify the vein. A guide wire was then inserted into the vein through the needle. A triple lumen catheter was then inserted into the vessel over the guide wire using the Seldinger technique. All ports showed good, free flowing blood return and were flushed with saline solution. The catheter was then securely fastened to the skin with sutures and with an adhesive dressing and covered with a sterile dressing. A post procedure X-ray was not indicated. The patient tolerated the procedure well.     Complications: bleeding, 6cc blood loss, first attempted appeared to be arterial, needle was retracted and pressure was held for 5 minutes

## 2022-03-28 NOTE — PROCEDURES
Intubation Procedure Note    Performed by: Francesca Sawyer MD,    Indication: Respiratory failure    Consent: Unable to be obtained due to patient's condition. Time out performed: Immediately prior to the procedure a \"time out\" was called to verify the correct patient, the correct procedure, equipment, support staff and site/side marked as required. Medications Used: None    Procedure: The patient was placed in the appropriate position. Intubation was performed by indirect laryngoscopy using a bronchoscope andusing indirect laryngoscopy with Glidescope, an 8.0 cuffed endotracheal tube. The cuff was then inflated and the tube was secured appropriately at a distance of 22 cm to the dental ridge. Initial confirmation of placement included bilateral breath sounds, an end tidal CO2 detector, absence of sounds over the stomach, tube fogging and adequate chest rise. A chest x-ray to verify correct placement of the tube showed the ET tube at the matthew, was retracted at request of Dr. Doll Sol     The patient tolerated the procedure well.      Complications: None

## 2022-03-28 NOTE — ED PROVIDER NOTES
[facility] ED  Emergency Department Encounter  Emergency Medicine Resident     Pt Name: Sunil Hernandez  MRN: [id number]  Eliseo 1955  Date of evaluation: 3/28/22  PCP:  Hever Murguia Dr       Chief Complaint   Patient presents with    Cardiac Arrest       HISTORY [facility]  (Location/Symptom, Timing/Onset, Context/Setting, Quality, Duration, Modifying Factors,Severity.)      Sunil Hernandez is a 78 y. o.yo male who presents with EMS as a post arrest.  Patient was reportedly walking to the grocery store for which he does every day when the members noticed that he appeared ill. Patient reportedly collapsed to the ground. Downtime per EMS was roughly 40 minutes, multiple rounds of CPR, he was PEA the entire time. I gel was placed in the field patient received 3 rounds of epinephrine prior to arrival.  On arrival patient did have ROSC. He was bradycardic in the 40s and epinephrine drip was started. Shortly after arrival patient lost pulses and ACLS was started. Patient does have history of alcoholism otherwise unsure of any other medical problems. PAST MEDICAL / SURGICAL / SOCIAL / FAMILY HISTORY      has a past medical history of Hypertension. has no past surgical history on file. Social History     Socioeconomic History    Marital status:      Spouse name: Not on file    Number of children: Not on file    Years of education: Not on file    Highest education level: Not on file   Occupational History    Not on file   Tobacco Use    Smoking status: Never Smoker    Smokeless tobacco: Never Used   Substance and Sexual Activity    Alcohol use:  Yes     Alcohol/week: 6.0 standard drinks     Types: 6 Cans of beer per week     Comment: pt states he drinks daily    Drug use: No    Sexual activity: Not on file   Other Topics Concern    Not on file   Social History Narrative    Not on file     Social Determinants of Health     Financial Resource Strain:  Difficulty of Paying Living Expenses: Not on file   Food Insecurity:     Worried About Running Out of Food in the Last Year: Not on file    Ran Out of Food in the Last Year: Not on file   Transportation Needs:     Lack of Transportation (Medical): Not on file    Lack of Transportation (Non-Medical): Not on file   Physical Activity:     Days of Exercise per Week: Not on file    Minutes of Exercise per Session: Not on file   Stress:     Feeling of Stress : Not on file   Social Connections:     Frequency of Communication with Friends and Family: Not on file    Frequency of Social Gatherings with Friends and Family: Not on file    Attends Protestant Services: Not on file    Active Member of [address] [place] [address] [employer] or Organizations: Not on file    Attends Club or Organization Meetings: Not on file    Marital Status: Not on file   Intimate Partner Violence:     Fear of Current or Ex-Partner: Not on file    Emotionally Abused: Not on file    Physically Abused: Not on file    Sexually Abused: Not on file   Housing Stability:     Unable to Pay for Housing in the Last Year: Not on file    Number of [place] in the Last Year: Not on file    Unstable Housing in the Last Year: Not on file       History reviewed. No pertinent family history. Allergies:  Patient has no known allergies. Home Medications:  Prior to Admission medications    Medication Sig Start Date End Date Taking?  Authorizing Provider   metoprolol succinate (TOPROL XL) 50 MG extended release tablet Take 200 mg by mouth daily     Historical Provider, MD       REVIEW OFSYSTEMS    (2-9 systems for level 4, 10 or more for level 5)      Review of Systems   Unable to perform ROS: Patient unresponsive       PHYSICAL EXAM   (up to 7 for level 4, 8 or more forlevel 5)      INITIAL VITALS:   ED Triage Vitals   BP Temp Temp src Pulse Resp SpO2 Height Weight   03/28/22 1034 -- -- 03/28/22 1031 03/28/22 1034 03/28/22 1034 -- --   (!) 194/115   (!) 35 23 (!) 70 % Critical Care    Venous Blood Gas, POC    Creatinine W/GFR Point of Care    POCT urea (BUN)    Lactic Acid, POC    POCT Glucose    Arterial Blood Gas, POC    Creatinine W/GFR Point of Care    POCT urea (BUN)    Lactic Acid, POC    POCT Glucose    EKG 12 Lead    TYPE AND SCREEN    EMERGENT PREPARE RBCS, 2 Units       MEDICATIONS ORDERED:  Orders Placed This Encounter   Medications    0.9 % sodium chloride bolus    EPINEPHrine (EPINEPHrine HCL) 5 mg in dextrose 5 % 250 mL infusion     Order Specific Question:   Titrate Infusion? Answer:   Yes     Order Specific Question:   Initial Infusion Dose: Answer:   1 mcg/min     Order Specific Question:   Goal of Therapy is: Answer:   MAP greater than 65 mmHg     Order Specific Question:   Goal of Therapy is: Answer: Other     Order Specific Question:   Other Goal:     Answer:   HR >65     Order Specific Question:   Contact Provider if:     Answer:   Patient is receiving the maximum dose and is not achieving the goal of therapy    DISCONTD: sodium bicarbonate 50 mEq in dextrose 5 % 1,000 mL infusion    norepinephrine (LEVOPHED) 16 mg in sodium chloride 0.9 % 250 mL infusion (non-weight-based)     Order Specific Question:   Titrate Infusion? Answer:   Yes     Order Specific Question:   Initial Infusion Dose: Answer:   5 mcg/min     Order Specific Question:   Goal of Therapy is:      Answer:   MAP greater than 65 mmHg     Order Specific Question:   Contact Provider if:     Answer:   Patient is receiving the maximum dose and is not achieving the goal of therapy    sodium bicarbonate 150 mEq in dextrose 5 % 1,000 mL infusion    pantoprazole (PROTONIX) 80 mg in sodium chloride 0.9 % 50 mL bolus    pantoprazole (PROTONIX) 80 mg in sodium chloride 0.9 % 100 mL infusion    iopamidol (ISOVUE-370) 76 % injection 75 mL       DDX: Cardiac arrest    Initial MDM/Plan: 78 y.o. male who presents with EMS as a cardiac arrest.  Patient was found collapsed in a grocery store, prehospital CPR time was roughly 40 minutes with 3 rounds of epinephrine PEA arrest the entire time. On arrival patient did have ROSC, was bradycardic, epi was administered patient shortly lost after lost pulses. ACLS was initiated after 1 round of CPR he again achieved ROSC. I gel was exchanged for an ET tube. Patient was started on epi drip. He was unresponsive, did not require any paralytic or sedative for intubation. IV access was established, patient remained slightly bradycardic but hypertensive. Had strong cardiac motion on ultrasound. Shortly after patient began to bradycardia down and become hypotensive, he did lose pulses again. 2 more rounds of CPR were initiated, 1 amp of bicarb was given, patient was given a dose of Levophed as well as 1 more push dose epi. ROSC was achieved. Hemoglobin was noted to be 5.6, patient's pH was 6.6.  1 unit of uncrossed blood was given while awaiting more blood from the blood bank. There was concerns for roughly 100 cc of bloody output from the OG, bedside fast was positive. Patient was taken to CT scan for CT head neck as well as CTA chest abdomen pelvis. At this point prognosis is guarded, will  is attempting to reach out to patient's family.     DIAGNOSTIC RESULTS / EMERGENCYDEPARTMENT COURSE / MDM     LABS:  Labs Reviewed   CBC WITH AUTO DIFFERENTIAL - Abnormal; Notable for the following components:       Result Value    WBC 12.8 (*)     RBC 2.61 (*)     Hemoglobin 5.6 (*)     Hematocrit 20.0 (*)     MCV 76.6 (*)     MCH 21.5 (*)     MCHC 28.0 (*)     RDW 17.9 (*)     NRBC Automated 0.8 (*)     Immature Granulocytes 4 (*)     nRBC 1 (*)     Absolute Immature Granulocyte 0.51 (*)     Absolute Mono # 0.90 (*)     All other components within normal limits   COMPREHENSIVE METABOLIC PANEL W/ REFLEX TO MG FOR LOW K - Abnormal; Notable for the following components:    Glucose 130 (*)     Calcium 8.0 (*)     Chloride 108 (*) CO2 12 (*)     Total Protein 6.0 (*)     Albumin 2.6 (*)     Albumin/Globulin Ratio 0.8 (*)     All other components within normal limits   BRAIN NATRIURETIC PEPTIDE - Abnormal; Notable for the following components:    Pro-BNP 1,512 (*)     All other components within normal limits   PROTIME-INR - Abnormal; Notable for the following components:    Protime 16.3 (*)     All other components within normal limits   APTT - Abnormal; Notable for the following components:    PTT 81.1 (*)     All other components within normal limits   LACTIC ACID - Abnormal; Notable for the following components:    Lactic Acid, Whole Blood 11.2 (*)     All other components within normal limits   BLOOD GAS, VENOUS - Abnormal; Notable for the following components:    pH, Jez 6.850 (*)     pCO2, Jez 78.2 (*)     pO2, Jez 92.2 (*)     HCO3, Venous 12.9 (*)     Negative Base Excess, Jez 19.0 (*)     O2 Sat, Jez 85.9 (*)     All other components within normal limits   ELECTROLYTES PLUS - Abnormal; Notable for the following components:    POC Chloride 108 (*)     POC TCO2 17 (*)     Anion Gap 17 (*)     All other components within normal limits   HGB/HCT - Abnormal; Notable for the following components:    POC Hemoglobin 7.7 (*)     POC Hematocrit 23 (*)     All other components within normal limits   ELECTROLYTES PLUS - Abnormal; Notable for the following components:    POC TCO2 15 (*)     Anion Gap 21 (*)     All other components within normal limits   HGB/HCT - Abnormal; Notable for the following components:    POC Hemoglobin 7.2 (*)     POC Hematocrit 21 (*)     All other components within normal limits   CALCIUM, IONIC (POC) - Abnormal; Notable for the following components:    POC Ionized Calcium 1.14 (*)     All other components within normal limits   VENOUS BLOOD GAS, POINT OF CARE - Abnormal; Notable for the following components:    pH, Jez 6.772 (*)     pCO2, Jez 96.4 (*)     HCO3, Venous 14.1 (*)     Negative Base Excess, Jez 20 (*) O2 Sat, Jez 34 (*)     All other components within normal limits   CREATININE W/GFR POINT OF CARE - Abnormal; Notable for the following components:    POC Creatinine 1.22 (*)     GFR Non- 59 (*)     All other components within normal limits   LACTIC ACID,POINT OF CARE - Abnormal; Notable for the following components:    POC Lactic Acid 10.97 (*)     All other components within normal limits   POCT GLUCOSE - Abnormal; Notable for the following components:    POC Glucose 130 (*)     All other components within normal limits   ARTERIAL BLOOD GAS, POC - Abnormal; Notable for the following components:    POC pH 7.098 (*)     POC PO2 71.5 (*)     POC HCO3 13.7 (*)     Negative Base Excess, Art 15 (*)     POC O2 SAT 87 (*)     All other components within normal limits   LACTIC ACID,POINT OF CARE - Abnormal; Notable for the following components:    POC Lactic Acid 11.73 (*)     All other components within normal limits   POCT GLUCOSE - Abnormal; Notable for the following components:    POC Glucose 131 (*)     All other components within normal limits   CULTURE, URINE   COVID-19, RAPID   TROPONIN   CALCIUM, IONIC (POC)   URINALYSIS WITH MICROSCOPIC   TOX SCR, BLD, ED   UREA N (POC)   CREATININE W/GFR POINT OF CARE   UREA N (POC)   TYPE AND SCREEN   EMERGENT PREPARE RBCS         RADIOLOGY:  CT Head WO Contrast    Result Date: 3/28/2022  EXAMINATION: CT OF THE HEAD WITHOUT CONTRAST  3/28/2022 11:18 am TECHNIQUE: CT of the head was performed without the administration of intravenous contrast. Dose modulation, iterative reconstruction, and/or weight based adjustment of the mA/kV was utilized to reduce the radiation dose to as low as reasonably achievable.  COMPARISON: 12/02/2017 HISTORY: ORDERING SYSTEM PROVIDED HISTORY: Post are TECHNOLOGIST PROVIDED HISTORY: Post are Decision Support Exception - unselect if not a suspected or confirmed emergency medical condition->Emergency Medical Condition (MA) Reason for Exam: post arrest FINDINGS: BRAIN/VENTRICLES: There is no acute intracranial hemorrhage, mass effect or midline shift. No abnormal extra-axial fluid collection. The gray-white differentiation is maintained without evidence of an acute infarct. There is no evidence of hydrocephalus. There are nonspecific hypoattenuating foci in the subcortical and periventricular white matter that most likely represent chronic microangiopathic ischemic changes in a patient of this age. ORBITS: The visualized portion of the orbits demonstrate no acute abnormality. SINUSES: Mild chronic mucosal thickening of the maxillary sinuses. No air-fluid levels in the paranasal sinuses. Mastoid air cells are clear. SOFT TISSUES/SKULL:  No acute abnormality the visualized skull or scalp soft tissues. Enteric tube and ET tube are present. No acute intracranial abnormality. XR CHEST PORTABLE    Result Date: 3/28/2022  EXAMINATION: ONE XRAY VIEW OF THE CHEST 3/28/2022 10:52 am COMPARISON: 12/07/2017 HISTORY: ORDERING SYSTEM PROVIDED HISTORY: Intubation TECHNOLOGIST PROVIDED HISTORY: Intubation FINDINGS: The endotracheal tube terminates 2 cm above the matthew. The enteric tube terminates at the level of the body of the stomach. Shallow inflation. The cardiac silhouette appears enlarged as before. Perihilar opacities, left upper lung field opacity and right basilar opacity are noted. No evidence for pneumothorax or significant effusion. No acute osseous abnormality identified. 1.  Endotracheal tube terminates 2 cm above the matthew. Enteric tube terminates at the body of the stomach. 2.  Bilateral airspace disease, which may represent inflammatory process or multifocal infection.          EKG      All EKG's are interpreted by the Emergency Department Physicianwho either signs or Co-signs this chart in the absence of a cardiologist.    EMERGENCY DEPARTMENT COURSE:  ED Course as of 03/28/22 1257   Mon Mar 28, 2022   1104 Hemoglobin Quant(!!): 5.6 [CD]   1227 Lengthy discussion was had with patient's brother.  was present. There are 7 siblings, brother states that will likely be him and his 1 sister who will be making the decisions. Requesting to wait to see him until he gets upstairs as he is very emotional. [CD]   1248 Critical care team at bedside. Plan for admission. [CD]      ED Course User Index  [CD] Shara Mcmahan DO      Please see MDM for emergency department course. PROCEDURES:  None    CONSULTS:  IP CONSULT TO CRITICAL CARE    CRITICAL CARE:  60 minutes were spent in the patients room performing direct patient care    FINAL IMPRESSION      1. Cardiac arrest [facility])          DISPOSITION / PLAN     DISPOSITION Decision To Admit 03/28/2022 11:48:24 AM      PATIENT REFERRED TO:  No follow-up provider specified.     DISCHARGE MEDICATIONS:  New Prescriptions    No medications on file       Shara Mcmahan DO  Emergency Medicine Resident    (Please note that portions of this note were completed with a voice recognition program.Efforts were made to edit the dictations but occasionally words are mis-transcribed.)        Shara Mcmahan DO  Resident  03/28/22 [id number]

## 2022-03-28 NOTE — ED NOTES
CPR initiated.  Pt pulse miladis down to [address] [facility] ODALIS Luis  03/28/22 [id number]

## 2022-03-28 NOTE — PROGRESS NOTES
Date: 3/28/2022  Time: 1034  Patient identity confirmed:  Yes  Indications: cardiac arrest  Preoxygenation: yes    Laryngoscope size and type Glidescope  Airway introducer used: Yes  Evac: Yes  ETT size:an 8.0 cuffed  Number of attempts:1   Cords visualized:  [x] Clearly  [] Poorly  Breath sounds present bilaterally: Yes   ETCO2   [x] Positive   ETT secured at  22 (pulled back after CXR from 23 to 22 cm @ lip)    ETT secured with yari  Chest x-ray ordered: Yes     Difficult airway:    No       If yes, was red tape placed around ETT:   No    Was this a Code Situation:    Yes                Procedure performed by: Dr Izabel Leos RCP  12:10 PM

## 2022-03-28 NOTE — CONSULTS
[facility]  Consultation Note     . REASON FOR CONSULTATION:  Upper GI bleeding and bright red blood per rectum      HISTORY OF PRESENT ILLNESS:     This is a 78 y.o. male who presents after a cardiac arrest.  Patient is intubated and sedated at the time of examination so history was obtained from EMR. Per chart, patient was at the grocery store for his daily grocery shopping when he collapsed to the ground and was found prone and unresponsive. Per EMS, CPR was started after EMS arrived and patient was found to be in PEA arrest.  Airway was obtained in the field by EMS and he was given 4 rounds of epinephrine and 30 minutes of CPR before ROSC was achieved. Patient was brought to the ER and multiple imaging studies and labs were done that showed mixed metabolic acidosis with pH 6.7 and CO2 95. Patient again became bradycardic and lost pulses in the ER and CPR was again started, ROSC was achieved after 1 round of CPR and 1 mg of epinephrine. Patient was started on an epinephrine drip. After intubation, OG tube was placed that showed brown/pink bloody return. Patient lost pulse again and received 1 more round of CPR before ROSC was achieved. Initial Hgb 5.6, and was given 2 units of pRBC. Per chart, patient only has a past medical history of essential hypertension and no surgical history found on Epic. Patient was also reportedly heavy alcohol use per family. EGD was done at bedside that showed bright red blood in posterior nasopharynx and in retropharynx. A small tongue laceration noted on the lateral aspect of the tongue on the left side. EGD Findings: 3/28:   1. Bright red blood was seen in the posterior nasopharynx and also in the retropharynx. 2. There was a small tongue laceration noted on the lateral aspect of the tongue on the left side. 3. The esophagus appeared normal.  There was no evidence of bleeding or stigmata of bleeding found in the esophagus.   4. There is no evidence of esophageal varices. 5. The stomach mucosa was characterized by mild congestion suggestive of diffuse portal hypertensive gastropathy and multiple erosions most likely due to NG tube trauma. 6. Otherwise the stomach mucosa showed no signs of bleeding. 7. There is no evidence of gastric varices. 8. The examined duodenum appeared normal.    EGD Pre-Op Diagnosis: HEMATEMISIS     EGD Post-Op Diagnosis: Same,   · bleeding from nasopharynx,   · mild portal hypertensive gastropathy,   · normal duodenum,   · no evidence of esophageal or gastric varices      Significant labs:  Ammonia 64, albumin 2.5, AST 88<--38, Bilirubin 1.34, direct bili 0.87, Total protein 5.7, WBC 12.8  Hgb 5.6, Plt 145,  PT 20, INR 2,   Lactic acid 11.2    Imaging Studies:   CXR 3/28:   Impression   1.  Endotracheal tube terminates 2 cm above the matthew.  Enteric tube   terminates at the body of the stomach.       2.  Bilateral airspace disease, which may represent inflammatory process or   multifocal infection. CT Head 3/28:  Impression   No acute intracranial abnormality. CT Cervical Spine 3/28:   Impression   No acute traumatic injury of the cervical spine.  Mild multilevel   degenerative changes.       Pleural effusions in the lung apices.         CTA Chest 3/28:  Impression   Acute traumatic nondisplaced to minimally displaced fractures of the right   anterior 3rd, 4th, 7th and 8th ribs, left anterior 2nd, 3rd, 4th, 5th, 6th   and 7th ribs as well as the right anterior 4th costochondral cartilage.    Acute traumatic nondisplaced fracture of the body of the sternum.       Substernal hematoma with small focus of active extravasation of contrast   suggesting arterial bleeding.       Hematomas in the subpleural space deep to the right costochondral cartilage   fractures and anterior rib fractures with foci of active extravasation of   contrast suggesting active bleeding from the right internal thoracic artery.     Bilateral pleural effusions.       Right perihilar upper lobe and lower lobe ground-glass opacities suggesting   pulmonary contusions.  No pneumothorax.       No acute traumatic injury of the thoracic aorta.       Upper abdominal ascites. CTA Abdomen Pelvis 3/28:   Impression   No acute traumatic injury of the abdomen or pelvis.       Findings of hepatic cirrhosis with abdominal and pelvic ascites possibly   related to the cirrhosis.       Mildly prominent loops of bowel throughout the abdomen and pelvis with   mucosal enhancement.  This may be secondary to mild ileus or shock bowel.  No   evidence of bowel obstruction.       No acute traumatic injury of the aorta.  No active extravasation of contrast.       See separate CT of the chest for discussion of those findings.             Previous GI history:  No EGD or Colonoscopy found on [facility] or Care everywhere    PAST MEDICAL HISTORY:  Past Medical History:   Diagnosis Date    Hypertension      History reviewed. No pertinent surgical history. ALLERGIES:  No Known Allergies    HOME MEDICATIONS:  Prior to Admission medications    Medication Sig Start Date End Date Taking? Authorizing Provider   metoprolol succinate (TOPROL XL) 50 MG extended release tablet Take 200 mg by mouth daily     Historical Provider, MD     .  CURRENT MEDICATIONS:  Scheduled Meds:   insulin lispro  0-18 Units SubCUTAneous Q4H    insulin lispro  0-9 Units SubCUTAneous Nightly    ampicillin-sulbactam  3,000 mg IntraVENous Q6H    fentaNYL        midazolam        metoclopramide  10 mg IntraVENous Once    fentaNYL        pantoprazole (PROTONIX) 40 mg injection  40 mg IntraVENous Q12H     .   Continuous Infusions:   EPINEPHrine infusion (NON-WEIGHT-BASED) 20 mcg/min (03/28/22 1241)    sodium bicarbonate infusion 50 mL/hr at 03/28/22 1104    propofol 10 mcg/kg/min (03/28/22 1231)    dextrose      DOPamine 5 mcg/kg/min (03/28/22 1507)    midazolam       .  PRN Meds:ondansetron **OR** ondansetron, polyethylene glycol, glucose, dextrose, glucagon (rDNA), dextrose  . SOCIAL HISTORY:     Social History     Socioeconomic History    Marital status:      Spouse name: Not on file    Number of children: Not on file    Years of education: Not on file    Highest education level: Not on file   Occupational History    Not on file   Tobacco Use    Smoking status: Never Smoker    Smokeless tobacco: Never Used   Substance and Sexual Activity    Alcohol use: Yes     Alcohol/week: 6.0 standard drinks     Types: 6 Cans of beer per week     Comment: pt states he drinks daily    Drug use: No    Sexual activity: Not on file   Other Topics Concern    Not on file   Social History Narrative    Not on file     Social Determinants of Health     Financial Resource Strain:     Difficulty of Paying Living Expenses: Not on file   Food Insecurity:     Worried About Running Out of Food in the Last Year: Not on file    Haven of Food in the Last Year: Not on file   Transportation Needs:     Lack of Transportation (Medical): Not on file    Lack of Transportation (Non-Medical):  Not on file   Physical Activity:     Days of Exercise per Week: Not on file    Minutes of Exercise per Session: Not on file   Stress:     Feeling of Stress : Not on file   Social Connections:     Frequency of Communication with Friends and Family: Not on file    Frequency of Social Gatherings with Friends and Family: Not on file    Attends [employer] Services: Not on file    Active Member of Clubs or Organizations: Not on file    Attends Club or Organization Meetings: Not on file    Marital Status: Not on file   Intimate Partner Violence:     Fear of Current or Ex-Partner: Not on file    Emotionally Abused: Not on file    Physically Abused: Not on file    Sexually Abused: Not on file   Housing Stability:     Unable to Pay for Housing in the Last Year: Not on file    Number of [place] in the Last Year: Not on file    Unstable Housing in the Last Year: Not on file       FAMILY HISTORY:   History reviewed. No pertinent family history. REVIEW OF SYSTEMS:     Unable to perform due to patient being intubated and sedated. PHYSICAL EXAM:    BP (!) 162/53   Pulse 51   Temp 92.7 °F (33.7 °C)   Resp 27   Wt 244 lb 11.4 oz (111 kg)   SpO2 96%   BMI 37.21 kg/m²   . TMAX[24]    General: Intubated, sedated  Head:  Normocephalic, Atraumatic  EENT: EOMI, Sclera not icteric, OG tube in place  Neck:  Supple, Trachea midline  Lungs:CTA Bilaterally, intubated and sedated  Heart: RRR, No murmur, No rub, No gallop, PMI nondisplaced. Abdomen:Soft, Non tender, Not distended, BS WNL,  No masses. Ext:No clubbing. No cyanosis. No edema. Skin: No rashes. No jaundice. No stigmata of liver disease. Neuro:  Intubated, sedated, not responsive    LABS and IMAGING:     Hemotological labs:   ANEMIA STUDIES  No results for input(s): LABIRON, TIBC, FERRITIN, [id number], FOLATE, OCCULTBLD in the last 72 hours.     CBC  Recent Labs     03/28/22  1042   WBC 12.8*   HGB 5.6*   MCV 76.6*   RDW 17.9*          PT/INR  Recent Labs     03/28/22  1042 03/28/22  1404   PROTIME 16.3* 20.0*   INR 1.6 2.0       BMP  Recent Labs     03/28/22  1042 03/28/22  1148 03/28/22  1404     --  134*   K 4.1  --  3.5*   *  --  104   CO2 12*  --  10*   BUN 12  --  13   CREATININE 0.96 1.19 1.12   GLUCOSE 130*  --  195*   CALCIUM 8.0*  --  7.5*       LIVER WORK UP  Hepatitis Functional Panel:  Recent Labs     03/28/22  1404   ALKPHOS 99   ALT 35   AST 88*   PROT 5.7*   BILITOT 1.34*   BILIDIR 0.87*   LABALBU 2.5*       AMYLASE/LIPASE/AMMONIA  Recent Labs     03/28/22  1404   AMMONIA 64*       Acute Hepatitis Panel   No results found for: HEPBSAG, HEPCAB, HEPBIGM, HEPAIGM    HCV Genotype   No results found for: HEPATITISCGENOTYPE    HCV Quantitative   No results found for: HCVQNT    Metabolic work up:  Ceruloplasmin  AA work up:  Alpha 1 antitrypsin   No results found for: A1A  AMA:  No results found for: MITOAB    ASMA:  No results found for: SMOOTHMUSCAB    ANCA:    JUAN JOSÉ:  No results found for: JUAN JOSÉ    Anti - Liver/Kidney Ab:  No results found for: LIVER-KIDNEYMICROSOMALAB    Ceruloplasmin:  No results found for: CERULOPLSM    Celiac panel:  No results found for: TISSTRNTIIGG, TTGIGA, IGA    Cancer Markers:  CEA:    No results for input(s): CEA in the last 72 hours. Ca 125:   No results for input(s):  in the last 72 hours. Ca 19-9:     Invalid input(s):   AFP: No results for input(s): AFP in the last 72 hours. ASSESSMENT and PLAN:     78year old male presented after cardiac arrest - PEA arrest per EMS and being down for about 30-35 mins after multiple rounds of CPR and epinephrine. OG tube was placed and showed     Ammonia 64, albumin 2.5, AST 88<--38, Bilirubin 1.34, direct bili 0.87, Total protein 5.7, WBC 12.8  Hgb 5.6, Plt 145,  PT 20, INR 2,   Lactic acid 11.2    1. S/p cardiac arrest  2. Cardiogenic shock    3. Oropharyngeal bleeding  4. Acute hypoxic respiratory failure  5. Acute blood loss anemia  6. Metabolic acidosis    Plan:     1. EGD doesn't show esophageal varices or any significant upper GI active bleeding. EGD showed bright red blood in posterior nasopharynx and in retropharynx. A small tongue laceration noted on the lateral aspect of the tongue on the left side. 2. Continue Protonix 40 mg IV once a day. 3. Continue Hemoglobin and hematocrit q6 hours, transfuse pRBC as clinically indicated. 4. Trend LFTs daily   5. Will consider colonoscopy if continues any further GI bleeding episodes. 6. Rest of the management per critical care team.  7. Recommend ENT consult for evaluation of bleeding in the nasopharynx and posterior pharynx. 8. Discontinue IV PPI and Octreotide infusions. 9. We will follow with you. Plan will be updated after discussing with Dr. Chandrika Lopez.      Electronically signed

## 2022-03-28 NOTE — OP NOTE
Operative Note      Patient: Patti Parker  YOB: 1955  MRN: [id number]    Date of Procedure: 3/28/2022    Pre-Op Diagnosis: HEMATEMISIS    Post-Op Diagnosis: Same,   · bleeding from nasopharynx,   · mild portal hypertensive gastropathy,   · normal duodenum,   · no evidence of esophageal or gastric varices       Procedure(s):  ** BED SIDE** EGD ESOPHAGOGASTRODUODENOSCOPY    Surgeon(s):  Levi Ny MD    Assistant:   First Assistant: Italo Chandler RN    Anesthesia: Other    Estimated Blood Loss (mL): None    Complications: None    Specimens:   * No specimens in log *    Implants:  * No implants in log *      Drains:   NG/OG/NJ/NE Tube Orogastric 16 fr Right mouth (Active)   Surrounding Skin Dry; Intact 03/28/22 1300   Securement device Yes 03/28/22 1300   Status Suction-low intermittent 03/28/22 1300   Placement Verified by X-Ray (Initial);by External Catheter Length 03/28/22 1300   NG/OG/NJ/NE External Measurement (cm) 55 cm 03/28/22 1300   Drainage Appearance Bloody 03/28/22 1042       Findings:   1. Bright red blood was seen in the posterior nasopharynx and also in the retropharynx. 2. There was a small tongue laceration noted on the lateral aspect of the tongue on the left side. 3. The esophagus appeared normal.  There was no evidence of bleeding or stigmata of bleeding found in the esophagus. 4. There is no evidence of esophageal varices. 5. The stomach mucosa was characterized by mild congestion suggestive of diffuse portal hypertensive gastropathy and multiple erosions most likely due to NG tube trauma. 6. Otherwise the stomach mucosa showed no signs of bleeding. 7. There is no evidence of gastric varices. 8. The examined duodenum appeared normal.    Recommendations  1. Please consult ENT for evaluation of bleeding in the nasopharynx and posterior pharynx. 2. Discontinue IV pantoprazole and IV octreotide infusions. 3. Change to IV Protonix 40 mg once daily.   4. May use NG tube for feeding. 5. Monitor H&H and transfuse as clinically indicated. 6. Continue to monitor clinical course in the ICU. 7. If patient shows signs of continued GI bleeding will consider colonoscopy. Detailed Description of Procedure:   Informed consent was obtained from the patient's brother after explanation of the procedure including indications, description of the procedure,  benefits and possible risks and complications of the procedure, and alternatives. Questions were answered. The patient's history was reviewed and a directed physical examination was performed prior to the procedure. Patient was monitored throughout the procedure with pulse oximetry and periodic assessment of vital signs. Patient was sedated as noted above. With the patient in the left lateral decubitus position, the Olympus videoendoscope was placed in the patient's mouth and under direct visualization passed into the esophagus. Visualization of the esophagus, stomach, and duodenum was performed during both introduction and withdrawal of the endoscope and retroflexed view of the proximal stomach was obtained. The scope was passed to the 3rd portion of the duodenum. The patient tolerated the procedure well and was taken to the recovery area in good condition. The patient  was taken to the recovery area in good condition.     Electronically signed by Dale Mobley MD on 3/28/2022 at 3:58 PM

## 2022-03-28 NOTE — ED NOTES
Pt. swtiched from bag ventillation to mechanical ventillation.       Leatha Cheema, RN  03/28/22 1835

## 2022-03-28 NOTE — H&P
Attending Physician Statement  I have discussed the care of Trinidad Cornell, including pertinent history and exam findings,  with the resident. I have seen and examined the patient and the key elements of all parts of the encounter have been performed by me. I agree with the assessment, plan and orders as documented by the resident with additions . Seen in the ER, chart reviewed and imaging reviewed. PEA arrest.  Cardiogenic shock  Acute hypoxic respiratory failure  Prolonged CPR  Multiple rib fractures and sternal fractures due to CPR  Retrosternal hematoma due to prolonged CPR  Question upper GI bleed -rule out esophageal variceal bleed due to presence of cirrhosis of liver with ascites. Acute anemia of blood loss. Cirrhosis of liver with ascites  Chronic alcohol use  Anion gap metabolic acidosis with respiratory acidosis  Aspiration pneumonia bilaterally  Evolving ROMAN  Plan  Appreciate cardiothoracic surgery input. EGD completed by GI no variceal bleeding. Bleeding from nasopharynx noted. If continues will need Rhino Rocket's. Continue ventilatory support with ICU ventilation protocol  Sedation with propofol  Will need neuro evaluation for prognostication and for anoxic brain injury. Unasyn. Panculture. Continue bicarb drip. Serial lactic acid  Monitor ABG  Supportive care  Due to prolonged CPR, prognosis is guarded. Total critical care time caring for this patient with life threatening, unstable organ failure, including direct patient contact, management of life support systems, review of data including imaging and labs, discussions with other team members and physicians at least 28   Min so far today, excluding procedures. Treatment plan Discussed with nursing staff in detail , all questions answered . Electronically signed by Ellen Miguel MD on   3/28/22 at 4:19 PM EDT    Please note that this chart was generated using voice recognition Dragon dictation software.   Although every effort was made to ensure the accuracy of this automated transcription, some errors in transcription may have occurred.

## 2022-03-28 NOTE — ED NOTES
Dr. Jennifer Max intubating pt. Good color change noted with bilateral breath sounds noted.       Carolina Avalos RN  03/28/22 8705

## 2022-03-28 NOTE — CONSULTS
.                                                 Neuro Critical Care Consult Note    Reason for Consult:  Neuro prognostication  Requesting Physician:  Dr. Jordy Herring  Attending Physician: Dr. Gemini Roberts    History Obtained From:  Aditi Bertin record    CHIEF COMPLAINT:       Cardiac arrest    HISTORY OF PRESENT ILLNESS:       The patient is a 78 y.o. male admitted on 3/28 after cardiac arrest with prolonged downtime. According to the medical record, the patient was walking to the grocery store, which he does every day, when he collapsed to the ground and was in cardiac arrest.  No bystander CPR until EMS arrival.  In the field, the patient underwent CPR with PEA throughout, epinephrine x4. Igel was placed by EMS and patient arrived to ER with ROSC, however was bradycardic and initiated on epinephrine. Approximately 40 minutes of downtime. Shortly after arrival in the emergency department, the patient again lost pulses, underwent 1 round of CPR, epi x1, ROSC. Was intubated, coded again, CPR x1 round, bicarb x1, continuous epinephrine infusion. While in the emergency department, the patient was found to have a mixed metabolic acidosis with pH 6.7 and CO2 of 95, hemoglobin 5.6. Status post 1 unit transfusion PRBC. CXR showed bilateral airspace disease that may represent inflammatory process or multifocal infection. CT head was unremarkable. Ct cervical showed pleural effusions in the apices of the lungs. CTA chest showed traumatic nondisplaced rib fractures and a traumatic nondisplaced fracture of the body of the sternum. A substernal hematoma with small focus of active extravasation of contrast suggesting arterial bleed and multiple hematomas in the subpleural space with active extravasation suggesting bleeding from the right internal thoracic artery. Ground glass opacities suggesting pulmonary contusions with upper abdominal ascites. He was admitted to the medical ICU for post cardiac arrest management. Myoclonic jerking noted, which terminated after 4mg Versed. His medical history significant for hypertension and alcohol use disorder. The patient is 1 of 7 siblings, family requesting neurological prognostication. PAST MEDICAL HISTORY :       Past Medical History:        Diagnosis Date    Hypertension        Past Surgical History:    History reviewed. No pertinent surgical history. Social History:   Social History     Socioeconomic History    Marital status:      Spouse name: Not on file    Number of children: Not on file    Years of education: Not on file    Highest education level: Not on file   Occupational History    Not on file   Tobacco Use    Smoking status: Never Smoker    Smokeless tobacco: Never Used   Substance and Sexual Activity    Alcohol use: Yes     Alcohol/week: 6.0 standard drinks     Types: 6 Cans of beer per week     Comment: pt states he drinks daily    Drug use: No    Sexual activity: Not on file   Other Topics Concern    Not on file   Social History Narrative    Not on file     Social Determinants of Health     Financial Resource Strain:     Difficulty of Paying Living Expenses: Not on file   Food Insecurity:     Worried About Running Out of Food in the Last Year: Not on file    Haven of Food in the Last Year: Not on file   Transportation Needs:     Lack of Transportation (Medical): Not on file    Lack of Transportation (Non-Medical):  Not on file   Physical Activity:     Days of Exercise per Week: Not on file    Minutes of Exercise per Session: Not on file   Stress:     Feeling of Stress : Not on file   Social Connections:     Frequency of Communication with Friends and Family: Not on file    Frequency of Social Gatherings with Friends and Family: Not on file    Attends Faith Services: Not on file    Active Member of Clubs or Organizations: Not on file    Attends Club or Organization Meetings: Not on file    Marital Status: Not on file Intimate Partner Violence:     Fear of Current or Ex-Partner: Not on file    Emotionally Abused: Not on file    Physically Abused: Not on file    Sexually Abused: Not on file   Housing Stability:     Unable to Pay for Housing in the Last Year: Not on file    Number of [place] in the Last Year: Not on file    Unstable Housing in the Last Year: Not on file       Family History:   History reviewed. No pertinent family history. Allergies:  Patient has no known allergies. Home Medications:  Prior to Admission medications    Medication Sig Start Date End Date Taking?  Authorizing Provider   metoprolol succinate (TOPROL XL) 50 MG extended release tablet Take 200 mg by mouth daily     Historical Provider, MD       Current Medications:   Current Facility-Administered Medications: EPINEPHrine (EPINEPHrine HCL) 5 mg in dextrose 5 % 250 mL infusion, 1 mcg/min, IntraVENous, Continuous  sodium bicarbonate 150 mEq in dextrose 5 % 1,000 mL infusion, , IntraVENous, Continuous  propofol injection, 5-50 mcg/kg/min, IntraVENous, Continuous  ondansetron (ZOFRAN-ODT) disintegrating tablet 4 mg, 4 mg, Oral, Q8H PRN **OR** ondansetron (ZOFRAN) injection 4 mg, 4 mg, IntraVENous, Q6H PRN  polyethylene glycol (GLYCOLAX) packet 17 g, 17 g, Oral, Daily PRN  insulin lispro (HUMALOG) injection vial 0-18 Units, 0-18 Units, SubCUTAneous, Q4H  insulin lispro (HUMALOG) injection vial 0-9 Units, 0-9 Units, SubCUTAneous, Nightly  glucose (GLUTOSE) 40 % oral gel 15 g, 15 g, Oral, PRN  dextrose 50 % IV solution, 12.5 g, IntraVENous, PRN  glucagon (rDNA) injection 1 mg, 1 mg, IntraMUSCular, PRN  dextrose 5 % solution, 100 mL/hr, IntraVENous, PRN  ampicillin-sulbactam (UNASYN) 3000 mg ivpb minibag, 3,000 mg, IntraVENous, Q6H  DOPamine (INTROPIN) 400 mg in dextrose 5 % 250 mL infusion, 1-20 mcg/kg/min, IntraVENous, Continuous  fentaNYL (SUBLIMAZE) 100 MCG/2ML injection, , ,   midazolam (VERSED) 2 MG/2ML injection, , ,   midazolam (VERSED) 1 mg/mL in D5W infusion, 1-10 mg/hr, IntraVENous, Continuous  metoclopramide (REGLAN) injection 10 mg, 10 mg, IntraVENous, Once  fentaNYL (SUBLIMAZE) 100 MCG/2ML injection, , ,   pantoprazole (PROTONIX) 40 mg in sodium chloride (PF) 10 mL injection, 40 mg, IntraVENous, Q12H  potassium chloride 20 mEq/50 mL IVPB (Central Line), 20 mEq, IntraVENous, PRN  rifaximin (XIFAXAN) tablet 200 mg, 200 mg, Per NG tube, TID    REVIEW OF SYSTEMS:       Unable to obtain    PHYSICAL EXAM:     BP (!) 162/53   Pulse 51   Temp 92.7 °F (33.7 °C)   Resp 27   Wt 244 lb 11.4 oz (111 kg)   SpO2 96%   BMI 37.21 kg/m²     PHYSICAL EXAM:  CONSTITUTIONAL:   Intubated   HEAD:  normocephalic, atraumatic    EYES:  Pupils pinpoint bilaterally, sluggish   ENT:  moist mucous membranes   NECK:  supple, symmetric   LUNGS:  Mechanically ventilated; equal air entry bilaterally   CARDIOVASCULAR:  normal s1 / s2, RRR, distal pulses intact   ABDOMEN:  Protuberant, distended   NEUROLOGIC:  Intubated on minimal sedation (2 mg/hr)    Cranial nerves-     II- pupils pinpoint bilaterally, sluggish     V/VII- absent corneal reflex     IX- positive gag reflex     X- absent cough reflex    No motor response to pain.        LABS AND IMAGING:     RECENT LABS:  CBC with Differential:    Lab Results   Component Value Date    WBC 12.8 03/28/2022    RBC 2.61 03/28/2022    HGB 5.6 03/28/2022    HCT 20.0 03/28/2022     03/28/2022    MCV 76.6 03/28/2022    MCH 21.5 03/28/2022    MCHC 28.0 03/28/2022    RDW 17.9 03/28/2022    NRBC 1 03/28/2022    LYMPHOPCT 35 03/28/2022    MONOPCT 7 03/28/2022    BASOPCT 1 03/28/2022    MONOSABS 0.90 03/28/2022    LYMPHSABS 4.48 03/28/2022    EOSABS 0.13 03/28/2022    BASOSABS 0.13 03/28/2022     BMP:    Lab Results   Component Value Date     03/28/2022    K 3.5 03/28/2022     03/28/2022    CO2 10 03/28/2022    BUN 13 03/28/2022    LABALBU 2.5 03/28/2022    CREATININE 1.12 03/28/2022    CREATININE 1.19 03/28/2022 CALCIUM 7.5 03/28/2022    GFRAA >60 03/28/2022    LABGLOM >60 03/28/2022    GLUCOSE 195 03/28/2022       RADIOLOGY:  CTA CHEST W WO CONTRAST   Final Result   Acute traumatic nondisplaced to minimally displaced fractures of the right   anterior 3rd, 4th, 7th and 8th ribs, left anterior 2nd, 3rd, 4th, 5th, 6th   and 7th ribs as well as the right anterior 4th costochondral cartilage. Acute traumatic nondisplaced fracture of the body of the sternum. Substernal hematoma with small focus of active extravasation of contrast   suggesting arterial bleeding. Hematomas in the subpleural space deep to the right costochondral cartilage   fractures and anterior rib fractures with foci of active extravasation of   contrast suggesting active bleeding from the right internal thoracic artery. Bilateral pleural effusions. Right perihilar upper lobe and lower lobe ground-glass opacities suggesting   pulmonary contusions. No pneumothorax. No acute traumatic injury of the thoracic aorta. Upper abdominal ascites. CTA ABDOMEN PELVIS W CONTRAST   Preliminary Result   No acute traumatic injury of the abdomen or pelvis. Findings of hepatic cirrhosis with abdominal and pelvic ascites possibly   related to the cirrhosis. Mildly prominent loops of bowel throughout the abdomen and pelvis with   mucosal enhancement. This may be secondary to mild ileus or shock bowel. No   evidence of bowel obstruction. No acute traumatic injury of the aorta. No active extravasation of contrast.      See separate CT of the chest for discussion of those findings. CT Head WO Contrast   Final Result   No acute intracranial abnormality. CT Cervical Spine WO Contrast   Final Result   No acute traumatic injury of the cervical spine. Mild multilevel   degenerative changes. Pleural effusions in the lung apices. XR CHEST PORTABLE   Final Result   1.   Endotracheal tube terminates 2 cm above the matthew. Enteric tube   terminates at the body of the stomach. 2.  Bilateral airspace disease, which may represent inflammatory process or   multifocal infection. CT CHEST WO CONTRAST    (Results Pending)     Labs and Images reviewed with:    [x] Farida Carpio MD    [] Bridgette Barron MD  [] Zuly Garcia MD  --[] there are no new interval images to review. ASSESSMENT AND PLAN:         ASSESSMENT:     This is a 78 y.o. male admitted on 3/28 after cardiac arrest with downtime of approximately 40 minutes. No bystander CPR until EMS arrived, approximately 10 minutes after resting. Patient with 40 minutes of PEA arrest, followed by PEA arrest x2 with eventual ROSC in the emergency department with continuous epinephrine drip, bicarbonate and 1 unit PRBC transfused due to hemoglobin of 5.6. Concern for possible GI bleed as etiology of anemia and subsequent cardiac arrest.  Given his prolonged downtime and poor neurological examination, requesting neuro prognostication. Concern for anoxic brain injury, given prolonged downtime. Also concern for subclinical status epilepticus, given myoclonic jerking prior to my evaluation. Patient care will be discussed with attending, will reevaluate patient along with attending. PLAN/MEDICAL DECISION MAKING:    - LTME monitoring.  - Will load with 4g Keppra x1; 1g BID maintenance dose  - Up-titrate Versed as needed if seizures on LTME  - Neuron-specific enolase ordered. - MRI brain @ 72h. - Recommend palliative care consult for family support. - Remainder of management per primary. We will continue to follow along. For any changes in exam or patient status please contact Neuro Critical Care.       Brenda Blake MD  Neuro Critical Care  3/28/2022     5:12 PM

## 2022-03-28 NOTE — ED PROVIDER NOTES
[facility]  Emergency Department  Faculty Attestation     I performed a history and physical examination of the patient and discussed management with the resident. I reviewed the residents note and agree with the documented findings and plan of care. Any areas of disagreement are noted on the chart. I was personally present for the key portions of any procedures. I have documented in the chart those procedures where I was not present during the key portions. I have reviewed the emergency nurses triage note. I agree with the chief complaint, past medical history, past surgical history, allergies, medications, social and family history as documented unless otherwise noted below. For Physician Assistant/ Nurse Practitioner cases/documentation I have personally evaluated this patient and have completed at least one if not all key elements of the E/M (history, physical exam, and MDM). Additional findings are as noted. Primary Care Physician:  David Major    Screenings:  [unfilled]    CHIEF COMPLAINT       Chief Complaint   Patient presents with    Cardiac Arrest       RECENT VITALS:    ,  Pulse: 60, Resp: 18, BP: 123/67    LABS:  Labs Reviewed   CULTURE, URINE   CBC WITH AUTO DIFFERENTIAL   COMPREHENSIVE METABOLIC PANEL W/ REFLEX TO MG FOR LOW K   TROPONIN   BRAIN NATRIURETIC PEPTIDE   PROTIME-INR   APTT   LACTIC ACID   URINALYSIS WITH MICROSCOPIC   PH, VENOUS       Radiology  XR CHEST PORTABLE    (Results Pending)   CT Head WO Contrast    (Results Pending)   CT Cervical Spine WO Contrast    (Results Pending)       CRITICAL CARE: There was a high probability of clinically significant/life threatening deterioration in this patient's condition which required my urgent intervention. Total critical care time was 25 minutes. This excludes any time for separately reportable procedures.      EKG:   EKG Interpretation    Interpreted by me    Rhythm: normal sinus   Rate: normal  Axis: Left  Ectopy: none  Conduction: Long QTc  ST Segments: Scooping depressions laterally  T Waves: no acute change  Q Waves: none    Clinical Impression: Nonspecific ST changes    Attending Physician Additional  Notes    Patient is brought by EMS after cardiac arrest.  He is irregular at the store, was noted over the past few days to not be looking very healthy. He was found unresponsive and I will prone position. No bystander CPR started until EMS arrived. The patient intubated with a supraglottic eye gel airway and have compressions started. He is had 30 minutes of CPR with 3 doses of epinephrine and initially PEA. He had a narrow complex rhythm. No ventricular fibrillation noted. His end-tidal CO2 was 58. At 30 to 35 minutes he had ROSC. He did receive a fourth dose of epinephrine. Medical record here shows history of alcohol use but no other past medical history known. On exam he initially had carotid pulses that were weak but no femoral or peripheral pulses. He then became more bradycardic and CPR was started. He had 1 round of CPR and 1 more milligram of epinephrine. He has ROSC with normal heart rate and strong pulses. He started on an epinephrine drip at 5 mcg/min. He was intubated with video laryngoscopic assistance and has bilateral breath sounds in a quiet stomach. There is end-tidal CO2 waveform and color change. He has bilateral leg edema symmetrical nature. He has overt cyanosis. Abdomen is distended with no masses noted. Bedside ultrasound shows good cardiac contractility, no RV dilation was obvious, no pericardial effusion. OG tube was inserted and he has brown to slightly pink return. Point-of-care chemistry shows severe mixed metabolic acidosis with pH 6.7 and CO2 of 95. Hematocrit 23. Concern is for cardiac arrest, possible GI bleed, rule out intracranial hemorrhage or neck trauma. Patient's prognosis for neurologic recovery is grave.     Patient now has loss of pulses after becoming more bradycardic. He received 1 more round of CPR. His IV access has been blown. A right tibial IO was inserted. Received 1 amp of bicarbonate, continuing epinephrine infusion. Pulses have returned. Kaylynn Luis.  Julieta Sandoval MD, [facility]  Attending Emergency  Physician               Stefan Raza MD  03/28/22 1342

## 2022-03-28 NOTE — ED NOTES
Verbal order from Dr. Michael Schwarz to start uncrossmatched blood. O neg blood started IV.  Blood double checked with [facility] JENNINGS, RN  03/28/22 7724

## 2022-03-29 NOTE — PLAN OF CARE
Problem: OXYGENATION/RESPIRATORY FUNCTION  Goal: Patient will maintain patent airway  3/28/2022 2228 by Whitney Burch RN  Outcome: Ongoing  3/28/2022 2056 by Greta Franco RCP  Outcome: Ongoing  3/28/2022 2011 by Jennifer Grewal RN  Outcome: Ongoing  3/28/2022 1219 by Saeed Worthington RCP  Outcome: Ongoing  Goal: Patient will achieve/maintain normal respiratory rate/effort  Description: Respiratory rate and effort will be within normal limits for the patient  3/28/2022 2228 by Whitney Burch RN  Outcome: Ongoing  3/28/2022 2056 by Greta Franco RCP  Outcome: Ongoing  3/28/2022 2011 by Jennifer Grewal RN  Outcome: Ongoing  3/28/2022 1219 by Saeed Worthington RCP  Outcome: Ongoing     Problem: MECHANICAL VENTILATION  Goal: Patient will maintain patent airway  3/28/2022 2228 by Whitney Burch RN  Outcome: Ongoing  3/28/2022 2056 by Greta Franco RCP  Outcome: Ongoing  3/28/2022 2011 by Jennifer Grewal RN  Outcome: Ongoing  3/28/2022 1219 by Saeed Worthington RCP  Outcome: Ongoing  Goal: Oral health is maintained or improved  3/28/2022 2228 by Whitney Burch RN  Outcome: Ongoing  3/28/2022 2056 by Greta Franco RCP  Outcome: Ongoing  3/28/2022 2011 by Jennifer Grewal RN  Outcome: Ongoing  3/28/2022 1219 by Saeed Worthington RCP  Outcome: Ongoing  Goal: ET tube will be managed safely  3/28/2022 2228 by Whitney Burch RN  Outcome: Ongoing  3/28/2022 2056 by Greta Franco RCP  Outcome: Ongoing  3/28/2022 2011 by Jennifer Grewal RN  Outcome: Ongoing  3/28/2022 1219 by Saeed Worthington RCP  Outcome: Ongoing  Goal: Ability to express needs and understand communication  3/28/2022 2228 by Whitney Burch RN  Outcome: Ongoing  3/28/2022 2056 by Greta Franco RCP  Outcome: Ongoing  3/28/2022 2011 by Jennifer Grewal RN  Outcome: Ongoing  3/28/2022 1219 by Saeed Worthington RCP  Outcome: Ongoing  Goal: Mobility/activity is maintained at optimum level for patient  3/28/2022 2228 by Whitney Burch RN  Outcome: Ongoing  3/28/2022 2056 by Thomas Alejandro RCP  Outcome: Ongoing  3/28/2022 2011 by Anirudh Staples RN  Outcome: Ongoing  3/28/2022 1219 by Paula Nuñez RCP  Outcome: Ongoing     Problem: SKIN INTEGRITY  Goal: Skin integrity is maintained or improved  3/28/2022 2228 by Caron Kc RN  Outcome: Ongoing  3/28/2022 2056 by Thomas Alejandro RCP  Outcome: Ongoing  3/28/2022 2011 by Anirudh Staples RN  Outcome: Ongoing  3/28/2022 1219 by Paula Nuñez RCP  Outcome: Ongoing     Problem: Falls - Risk of:  Goal: Will remain free from falls  Description: Will remain free from falls  Outcome: Ongoing  Goal: Absence of physical injury  Description: Absence of physical injury  Outcome: Ongoing     Problem: Skin Integrity:  Goal: Will show no infection signs and symptoms  Description: Will show no infection signs and symptoms  Outcome: Ongoing  Goal: Absence of new skin breakdown  Description: Absence of new skin breakdown  Outcome: Ongoing     Problem: Confusion - Acute:  Goal: Absence of continued neurological deterioration signs and symptoms  Description: Absence of continued neurological deterioration signs and symptoms  Outcome: Ongoing  Goal: Mental status will be restored to baseline  Description: Mental status will be restored to baseline  Outcome: Ongoing     Problem: Discharge Planning:  Goal: Ability to perform activities of daily living will improve  Description: Ability to perform activities of daily living will improve  Outcome: Ongoing  Goal: Participates in care planning  Description: Participates in care planning  Outcome: Ongoing     Problem: Injury - Risk of, Physical Injury:  Goal: Will remain free from falls  Description: Will remain free from falls  Outcome: Ongoing  Goal: Absence of physical injury  Description: Absence of physical injury  Outcome: Ongoing     Problem: Mood - Altered:  Goal: Mood stable  Description: Mood stable  Outcome: Ongoing  Goal: Absence of abusive behavior  Description: Absence of abusive behavior  Outcome: Ongoing  Goal: Verbalizations of feeling emotionally comfortable while being cared for will increase  Description: Verbalizations of feeling emotionally comfortable while being cared for will increase  Outcome: Ongoing     Problem: Psychomotor Activity - Altered:  Goal: Absence of psychomotor disturbance signs and symptoms  Description: Absence of psychomotor disturbance signs and symptoms  Outcome: Ongoing     Problem: Sensory Perception - Impaired:  Goal: Demonstrations of improved sensory functioning will increase  Description: Demonstrations of improved sensory functioning will increase  Outcome: Ongoing  Goal: Decrease in sensory misperception frequency  Description: Decrease in sensory misperception frequency  Outcome: Ongoing  Goal: Able to refrain from responding to false sensory perceptions  Description: Able to refrain from responding to false sensory perceptions  Outcome: Ongoing  Goal: Demonstrates accurate environmental perceptions  Description: Demonstrates accurate environmental perceptions  Outcome: Ongoing  Goal: Able to distinguish between reality-based and nonreality-based thinking  Description: Able to distinguish between reality-based and nonreality-based thinking  Outcome: Ongoing  Goal: Able to interrupt nonreality-based thinking  Description: Able to interrupt nonreality-based thinking  Outcome: Ongoing     Problem: Sleep Pattern Disturbance:  Goal: Appears well-rested  Description: Appears well-rested  Outcome: Ongoing

## 2022-03-29 NOTE — CONSULTS
[facility]   Consult Note         Today's Date: 3/29/2022  Patient Name: Micheline Murphy  Date of admission: 3/28/2022 10:29 AM  Patient's age: 78 y.o., 1955  Admission Dx: Cardiac arrest [facility]) [I46.9]  Requesting Physician: Estrada Lora MD  History Obtained From:  Patient, Electronic Medical Records    Chief Complaint: PEA cardiac arrest    History Of Present Illness:  Micheline Murphy is a 78 y.o. male who presented after a cardiac arrest with prolonged downtime of about 40 minutes. Patient did not receive any CPR by bystanders. Patient was given 4 rounds of epinephrine and 30 minutes of CPR prior to arrival, ROSC was achieved, patient became bradycardic and lost pulses again requiring another round of CPR with plan milligram epinephrine. Patient was subsequently started on epinephrine drip. Patient found to have bright red blood per rectum requiring colonoscopy, CT chest concerning for multifocal pneumonia, substernal hematoma, rib fractures secondary to CPR. Patient underwent EGD which was unremarkable, CT surgery consulted for retrosternal hematoma and recommended monitoring hemoglobin and CT chest.    Reason for Consult: Cardiac arrest and cardiogenic shock    Patient was hypothermic on arrival and was on epinephrine and dopamine drip, was weaned off of epinephrine drip and continued on dopamine drip. Patient currently on propofol and Versed for sedation. Labs at time of admission showed high anion gap metabolic acidosis with lactic acidosis 11.4, potassium 3.5, bicarb 10, anion gap 20. PT/INR 20/2.0     TSH pending   Hemoglobin A1C pending   Trops 45-42-   Hemoglobin 5.6, required 1 unit of PRBC, hemoglobin 7.1 this morning.  Leukocytosis 15.1   ProBNP 1512   Echo during this admissiondone but pending      Past Medical History:     HypertensionToprol- mg daily. has a past medical history of Hypertension.     Past Surgical History:   has no past surgical history on file. Home Medications:    Prior to Admission medications    Medication Sig Start Date End Date Taking? Authorizing Provider   metoprolol succinate (TOPROL XL) 50 MG extended release tablet Take 200 mg by mouth daily     Historical Provider, MD     insulin lispro, 0-18 Units, SubCUTAneous, Q4H    ampicillin-sulbactam, 3,000 mg, IntraVENous, Q6H    pantoprazole (PROTONIX) 40 mg injection, 40 mg, IntraVENous, Q12H    rifaximin, 200 mg, Per NG tube, TID    levetiracetam, 1,000 mg, IntraVENous, Q12H    bisacodyl, 20 mg, Oral, Once    Allergies:  Patient has no known allergies. Social History:   reports that he has never smoked. He has never used smokeless tobacco. He reports current alcohol use of about 6.0 standard drinks of alcohol per week. He reports that he does not use drugs. Family History: family history is not on file. Review of Systems:    Unable to assess due to patient's mentation  Physical Exam:      BP 93/64   Pulse 75   Temp 100.2 °F (37.9 °C) (Esophageal)   Resp 23   Ht 5' 9\" (1.753 m)   Wt 254 lb 3.1 oz (115.3 kg)   SpO2 100%   BMI 37.54 kg/m²     General:  Intubated and sedated  HEENT: Atraumatic, normocephalic, no throat congestion, moist mucosa. ET in situ, tongue twitching noted  Neck:   Supple  Chest:   Ventilated breath sounds  Cardiac:  S1 S2 RR, no gallops, murmur or rubs. Abdomen: distended  Neuro:  Unable to assess  SKIN:  No rashes, good skin turgor. Extremities:  No edema. Cardiac Investigations during this admission:  EKG: As above    Echo: Pending    Stress Test: N/A    Cardiac Cath: N/A    Labs:   CBC:   Recent Labs     03/28/22  1042 03/28/22  1955 03/28/22 2302 03/29/22  0500   WBC 12.8*  --   --  15.1*   HGB 5.6*   < > 7.4* 7.1*   HCT 20.0*   < > 23.6* 22.0*     --   --  157    < > = values in this interval not displayed.      BMP:   Recent Labs     03/28/22 2303 03/29/22  0500   * 138   K 3.7 3.7   CO2 14* 18*   BUN 15 18 CREATININE 1.62* 2.08*   LABGLOM 43* 32*   GLUCOSE 221* 98     BNP: No results for input(s): BNP in the last 72 hours. PT/INR:   Recent Labs     03/28/22  1042 03/28/22  1404   PROTIME 16.3* 20.0*   INR 1.6 2.0     APTT:  Recent Labs     03/28/22  1042 03/28/22  1404   APTT 81.1* 50.9*     CARDIAC ENZYMES:No results for input(s): CKTOTAL, CKMB, CKMBINDEX, TROPONINI in the last 72 hours. FASTING LIPID PANEL:No results found for: HDL, LDLDIRECT, LDLCALC, TRIG  LIVER PROFILE:  Recent Labs     03/28/22  1404 03/29/22  0500   AST 88* 81*   ALT 35 41   LABALBU 2.5* 2.5*       Other Current Problems  Patient Active Problem List   Diagnosis    Alcohol intoxication in alcoholism with blood level over 0.3 without complication (HCC)    Cardiac arrest ([facility] [address]    Acute blood loss anemia    Hematemesis    Alcoholic cirrhosis of liver with ascites (HCC)       Impression:  · PEA cardiac arrestprolonged CPR  · ? Cardiogenic shock  · Acute hypoxic respiratory failure  · Retrosternal hematoma  · Cirrhosis with ascites  · Alcohol use disorder  · High anion gap metabolic acidosis  · ROMAN  · ? Aspiration pneumonia  · Morbid obesity with BMI 37    Recommendations:    · Continue dopamine for pressor support, wean off as tolerated  · Follow-up on echo  · Keep hemoglobin greater than 7  · Keep potassium greater than 4 and magnesium greater than 2  · Ischemia work-up if meaningful neurological recovery  · Rest of the management per primary  · We will follow along  · Guarded prognosis due to prolonged downtime and CPR        Will discuss with attending physician. Recommendations above are pending approval by attending physician. Giovanny Lee [address]  Internal Medicine/ Cardiology Resident  [address] [facility]  3/29/2022 7:20 AM          Attestation signed by      Attending Physician Statement:    I have discussed the care of  Jd Desai , including pertinent history and exam findings, with the Cardiology fellow/resident. I have seen and examined the patient and the key elements of all parts of the encounter have been performed by me. I agree with the assessment, plan and orders as documented by the fellow/resident, after I modified exam findings and plan of treatments, and the final version is my approved version of the assessment. Additional Comments: PEA arrest. 40 minutes down time. Hypothermic on arrival. H/o cirrhosis. Patient has Lactic acidosis, ROMAN. Significant anemia. BBPR. GI on board. S/p transfusion. S/p EGD- no source noted. Concern for anoxic injury. Neurology on board. Pressor as needed. Continue supportive care. Transfuse as needed. HS trop elevation due to cardiac arrest. ECG shows NSR ST-T changes. Unable to start ASA and IV heparin due to bleeding. Abdomen distended-Ascites. Obtain TTE. BP support with pressors as needed.      Chio Bermeo MD

## 2022-03-29 NOTE — PLAN OF CARE
Called patient and left message for a return call  NP updated    Nutrition Problem #1: Inadequate oral intake  Intervention: Food and/or Nutrient Delivery: Continue NPO (If TF desired, recommend Peptide-based High Protein at 60 mL/hr continuous.)  Nutritional Goals: Initiate some form of nutrition within 24-72 hours.

## 2022-03-29 NOTE — PROGRESS NOTES
Comprehensive Nutrition Assessment    Type and Reason for Visit:  Initial (Vent Check)    Nutrition Recommendations/Plan:   - Continue NPO  - If TF desired, recommend Peptide-based High Protein at 60 mL/hr continuous   - Provides 1440 kcal, 126 g PRO  - Will follow for start of nutrition  - Monitor weight    Nutrition Assessment:  74yo M adm for mgt following cardiac arrest, hematemesis noted pt s/p EGD. No propofol running. No wt hx per chart x 1.5 yrs. Malnutrition Assessment:  Malnutrition Status:  Insufficient data    Context:  Acute Illness       Estimated Daily Nutrient Needs:  Energy (kcal):  1300 to 1600 kcal/day (11-14 kcal/kg); Weight Used for Energy Requirements:  Admission     Protein (g):  120 to 140 g/day (1.7-2 g/kg); Weight Used for Protein Requirements:  Ideal        Fluid (ml/day):  Per MD; Method Used for Fluid Requirements:  Other (Comment)      Nutrition Related Findings:  Labs/meds reviewed. LBM 3/28. +1 generalized/extremity edema. +OGT. Wounds:  Skin Tears (abrasion (sternum), scattered bruising)       Current Nutrition Therapies:    Diet NPO    Anthropometric Measures:  · Height: 5' 9\" (175.3 cm)  · Current Body Weight: 254 lb 3.1 oz (115.3 kg) (3/29, bedscale)   · Admission Body Weight: 254 lb 3.1 oz (115.3 kg) (3/29, bedscale)    · Ideal Body Weight: 160 lbs; % Ideal Body Weight 158.9 %   · BMI: 37.5  · BMI Categories: Obese Class 2 (BMI 35.0 -39.9)       Nutrition Diagnosis:   · Inadequate oral intake related to impaired respiratory function as evidenced by intubation    Nutrition Interventions:   Food and/or Nutrient Delivery:  Continue NPO (If TF desired, recommend Peptide-based High Protein at 60 mL/hr continuous.)  Nutrition Education/Counseling:  No recommendation at this time   Coordination of Nutrition Care:  Continue to monitor while inpatient    Goals:  Initiate some form of nutrition within 24-72 hours.        Nutrition Monitoring and Evaluation: Behavioral-Environmental Outcomes:  None Identified   Food/Nutrient Intake Outcomes:  Diet Advancement/Tolerance  Physical Signs/Symptoms Outcomes:  Biochemical Data,GI Status,Fluid Status or Edema,Nutrition Focused Physical Findings,Skin,Weight     Discharge Planning:     Too soon to determine     Electronically signed by Shea Payan MS, RD, LD on 3/29/22 at 11:23 AM EDT    Contact: [phone]

## 2022-03-29 NOTE — PROGRESS NOTES
Daily Progress Note  Neuro Critical Care    Patient Name: Aracelis Pearson  Patient : 1955  Room/Bed: 7957/8179-93  Code Status: DNR-CCA  Allergies: No Known Allergies    CHIEF COMPLAINT:      Post Cardiac arrest     INTERVAL HISTORY    Initial Presentation (Admitted 3/28/22): The patient is a 78 y.o. male admitted on 3/28 after cardiac arrest with prolonged downtime. According to the medical record, the patient was walking to the grocery store, which he does every day, when he collapsed to the ground and was in cardiac arrest.  No bystander CPR until EMS arrival.  In the field, the patient underwent CPR with PEA throughout, epinephrine x4. Igel was placed by EMS and patient arrived to ER with ROSC, however was bradycardic and initiated on epinephrine. Approximately 40 minutes of downtime. Shortly after arrival in the emergency department, the patient again lost pulses, underwent 1 round of CPR, epi x1, ROSC. Was intubated, coded again, CPR x1 round, bicarb x1, continuous epinephrine infusion.      While in the emergency department, the patient was found to have a mixed metabolic acidosis with pH 6.7 and CO2 of 95, hemoglobin 5.6. Status post 1 unit transfusion PRBC. CXR showed bilateral airspace disease that may represent inflammatory process or multifocal infection. CT head was unremarkable. Ct cervical showed pleural effusions in the apices of the lungs. CTA chest showed traumatic nondisplaced rib fractures and a traumatic nondisplaced fracture of the body of the sternum. A substernal hematoma with small focus of active extravasation of contrast suggesting arterial bleed and multiple hematomas in the subpleural space with active extravasation suggesting bleeding from the right internal thoracic artery. Ground glass opacities suggesting pulmonary contusions with upper abdominal ascites.      He was admitted to the medical ICU for post cardiac arrest management.  Myoclonic jerking noted, which terminated after 4mg Versed.     His medical history significant for hypertension and alcohol use disorder.     The patient is 1 of 7 siblings, family requesting neurological prognostication. Last 24h:   No acute events overnight. No seizure activity overnight. This morning, patient had right sided tongue twitching that appeared rhythmic. LTME reviewed with epileptologist, no seizures. Will continue Keppra for now, trial 10mg Versed bolus and evaluate for resolution of tongue twitching. MRI brain @ 72 hours. Family at bedside updated.      CURRENT MEDICATIONS:  SCHEDULED MEDICATIONS:   insulin lispro  0-18 Units SubCUTAneous Q4H    ampicillin-sulbactam  3,000 mg IntraVENous Q6H    pantoprazole (PROTONIX) 40 mg injection  40 mg IntraVENous Q12H    rifaximin  200 mg Per NG tube TID    levetiracetam  1,000 mg IntraVENous Q12H    bisacodyl  20 mg Oral Once     CONTINUOUS INFUSIONS:   sodium chloride      sodium bicarbonate infusion 50 mL/hr at 22 0949    dextrose      DOPamine 4 mcg/kg/min (22 1238)    midazolam 2 mg/hr (22 0913)    fentaNYL Stopped (22 0532)     PRN MEDICATIONS:   sodium chloride, ondansetron **OR** ondansetron, polyethylene glycol, glucose, dextrose, glucagon (rDNA), dextrose    VITALS:  Temperature Range: Temp: 98.1 °F (36.7 °C) Temp  Av.7 °F (35.4 °C)  Min: 91.9 °F (33.3 °C)  Max: 100.2 °F (37.9 °C)  BP Range: Systolic (53PJV), MRA:639 , Min:61 , IKK:959     Diastolic (83XUB), NLE:01, Min:37, Max:132    Pulse Range: Pulse  Av.4  Min: 51  Max: 82  Respiration Range: Resp  Av.5  Min: 17  Max: 40  Current Pulse Ox: SpO2: 98 %  24HR Pulse Ox Range: SpO2  Av.7 %  Min: 86 %  Max: 100 %  Patient Vitals for the past 12 hrs:   BP Temp Temp src Pulse Resp SpO2 Height Weight   22 1210     20 98 %     22 1208    70 20 97 %     22 1105       5' 9\" (1.753 m)    22 1045    68 20 96 %     22 1030    69 20 97 %   03/29/22 1015    68 20 96 %     03/29/22 1000  98.1 °F (36.7 °C)  70 20 97 %     03/29/22 0845    71 20 100 %     03/29/22 0830    76 21 100 %     03/29/22 0815    71 20 98 %     03/29/22 0800 (!) 120/55 98.2 °F (36.8 °C) Esophageal 70 20 91 %     03/29/22 0752    72 21 (!) 86 %     03/29/22 0745    70 20 97 %     03/29/22 0730    68 20 97 %     03/29/22 0715    69 20 96 %     03/29/22 0700    69 20 97 %     03/29/22 0626    75 23 100 %     03/29/22 0500    70 22 100 %     03/29/22 0449        254 lb 3.1 oz (115.3 kg)   03/29/22 0445    70 20 100 %     03/29/22 0430    68 18 100 %     03/29/22 0415    71 24 100 %     03/29/22 0400    74 22 100 %     03/29/22 0345    73 18 100 %     03/29/22 0330    73 19 100 %     03/29/22 0321    74 24 100 %     03/29/22 0315    73 17 100 %     03/29/22 0300 93/64   72 24 100 %     03/29/22 0245    71 23 100 %     03/29/22 0230    72 24 100 %     03/29/22 0215    71 18 100 %     03/29/22 0200 (!) 92/59   69 23 100 %     03/29/22 0145    69 22 100 %     03/29/22 0130    71 (!) 40 100 %     03/29/22 0115    70 24 100 %     03/29/22 0100 (!) 86/59   68 25 100 %     03/29/22 0045    68 26 100 %       Estimated body mass index is 37.54 kg/m² as calculated from the following:    Height as of this encounter: 5' 9\" (1.753 m).     Weight as of this encounter: 254 lb 3.1 oz (115.3 kg).  []<16 Severe malnutrition  []1616.99 Moderate malnutrition  []1718.49 Mild malnutrition  []18.524.9 Normal  []2529.9 Overweight (not obese)  []3034.9 Obese class 1 (Low Risk)  [x]3539.9 Obese class 2 (Moderate Risk)  []?40 Obese class 3 (High Risk)    RECENT LABS:   Lab Results   Component Value Date    WBC 15.1 (H) 03/29/2022    HGB 8.3 (L) 03/29/2022    HCT 24.9 (L) 03/29/2022     03/29/2022    ALT 41 03/29/2022    AST 81 (H) 03/29/2022  03/29/2022    K 3.7 03/29/2022     03/29/2022    CREATININE 2.08 (H) 03/29/2022    BUN 18 03/29/2022    CO2 18 (L) 03/29/2022    INR 2.0 03/28/2022     24 HOUR INTAKE/OUTPUT:    Intake/Output Summary (Last 24 hours) at 3/29/2022 1240  Last data filed at 3/29/2022 1208  Gross per 24 hour   Intake 5542.6 ml   Output 318 ml   Net 5224.6 ml       IMAGING:   CT CHEST WO CONTRAST   Final Result   1. Interval decrease in size of substernal hematoma. 2.  Increasing right pleural effusion, now moderate in size. Blood products   in this area appear extrapleural.  Similar appearance of low-density left   pleural effusion. 3.  Redemonstration of sternal and bilateral rib fractures with overlying   subcutaneous edema. 4.  Cirrhotic liver morphology and ascites. XR ABDOMEN FOR NG/OG/NE TUBE PLACEMENT   Preliminary Result   Enteric tube looped within the body of the stomach with the tip directed   superiorly towards the fundus of the stomach. XR ABDOMEN FOR NG/OG/NE TUBE PLACEMENT   Final Result   No change in position of the tip of endotracheal tube. If this has been   advanced., Question it may be coiled off the field of view of the   radiograph. .  If warranted, consider chest x-ray or soft tissue neck imaging. Otherwise, consider advancement 10-15 cm. The findings were sent to the Radiology Results [address] at 1:09   am on 3/29/2022to be communicated to a licensed caregiver. XR ABDOMEN FOR NG/OG/NE TUBE PLACEMENT   Final Result   Enteric tube terminating proximal to the GE junction. This should be   advanced approximately 10 cm, and reimaged. XR ABDOMEN FOR NG/OG/NE TUBE PLACEMENT   Final Result   Enteric tube terminates the level of the GE junction. This needs advanced   least 10 cm. The findings were sent to the Radiology Results [address] at 8:49   pm on 3/28/2022to be communicated to a licensed caregiver.          CTA CHEST W WO CONTRAST   Final Result   Acute traumatic nondisplaced to minimally displaced fractures of the right   anterior 3rd, 4th, 7th and 8th ribs, left anterior 2nd, 3rd, 4th, 5th, 6th   and 7th ribs as well as the right anterior 4th costochondral cartilage. Acute traumatic nondisplaced fracture of the body of the sternum. Substernal hematoma with small focus of active extravasation of contrast   suggesting arterial bleeding. Hematomas in the subpleural space deep to the right costochondral cartilage   fractures and anterior rib fractures with foci of active extravasation of   contrast suggesting active bleeding from the right internal thoracic artery. Bilateral pleural effusions. Right perihilar upper lobe and lower lobe ground-glass opacities suggesting   pulmonary contusions. No pneumothorax. No acute traumatic injury of the thoracic aorta. Upper abdominal ascites. CTA ABDOMEN PELVIS W CONTRAST   Preliminary Result   No acute traumatic injury of the abdomen or pelvis. Findings of hepatic cirrhosis with abdominal and pelvic ascites possibly   related to the cirrhosis. Mildly prominent loops of bowel throughout the abdomen and pelvis with   mucosal enhancement. This may be secondary to mild ileus or shock bowel. No   evidence of bowel obstruction. No acute traumatic injury of the aorta. No active extravasation of contrast.      See separate CT of the chest for discussion of those findings. CT Head WO Contrast   Final Result   No acute intracranial abnormality. CT Cervical Spine WO Contrast   Final Result   No acute traumatic injury of the cervical spine. Mild multilevel   degenerative changes. Pleural effusions in the lung apices. XR CHEST PORTABLE   Final Result   1. Endotracheal tube terminates 2 cm above the matthew. Enteric tube   terminates at the body of the stomach.       2.  Bilateral airspace disease, which may represent inflammatory process or   multifocal infection. Labs and Images reviewed with:  [x] Dr. Damien Smith. Tino    [] Dr. Thang Mattehws  [] Dr. Shmuel Can  [] There are no new interval images to review. PHYSICAL EXAM       CONSTITUTIONAL:  Intubated, off sedation. Opens eyes briefly to deep noxious stimulation. Does not track or follow commands. No spontaneous movement. HEAD:  normocephalic   EYES:  Pupils 2mm, brisk bilaterally   ENT:  moist mucous membranes   NECK:  supple, symmetric   LUNGS:  Equal air entry bilaterally   CARDIOVASCULAR:  normal s1 / s2, RRR, distal pulses intact   ABDOMEN:  Soft, no rigidity   NEUROLOGIC:  Mental Status: Intubated, off sedation. Does not open eyes or follow commands             Cranial Nerves:    III: Pupils:  equal, round, reactive to light  V: Corneal reflex:  completed. abnormal absent bilaterally  Weak cough    Motor Exam:    No  Movement to noxious stimulation         DRAINS:  [x] There are no drains for Neuro Critical Care to monitor at this time. ASSESSMENT AND PLAN:       This is a 78 y.o. male admitted on 3/28 after cardiac arrest with downtime of approximately 40 minutes. No bystander CPR until EMS arrived, approximately 10 minutes after resting. Patient with 40 minutes of PEA arrest, followed by PEA arrest x2 with eventual ROSC in the emergency department with continuous epinephrine drip, bicarbonate and 1 unit PRBC transfused due to hemoglobin of 5.6. Concern for possible GI bleed as etiology of anemia and subsequent cardiac arrest.  Given his prolonged downtime and poor neurological examination, requesting neuro prognostication. Concern for anoxic brain injury, given prolonged downtime.  Also concern for subclinical status epilepticus, given myoclonic jerking prior to my evaluation.     PLAN/MEDICAL DECISION MAKING:     - LTME monitoring.  - Loaded with 4g Keppra x1; 1g BID maintenance dose  - Versed 10mg x 1 today and re-evaluate tongue twitching  - Neuron-specific enolase ordered. - MRI brain @ 72h (3/31)  - Recommend palliative care consult for family support. - Remainder of management per primary.     We will continue to follow along.      For any changes in exam or patient status please contact Neuro Critical Care.             KATIE Lerma - [facility]  Neuro Critical Care  Pager [phone]  3/29/2022     12:40 PM

## 2022-03-29 NOTE — PROGRESS NOTES
INTENSIVE CARE UNIT  Resident Physician Progress Note    Patient - Bella Daly  Date of Admission -  3/28/2022 10:29 AM  Date of Evaluation -  3/29/2022  Room and Bed Number -  1106 [facility] 9 Day - 1  Chief Complaint   Patient presents with    Cardiac Arrest      SUBJECTIVE:     HISTORY OF PRESENT ILLNESS:    21-year-old male with a history of alcohol use, hypertension presented to the emergency room by EMS as a postarrest.  Patient had an unknown downtime and was found prone on the ground. No bystander CPR was done. EMS performed CPR for about 40 minutes, patient was initially found to be in PEA arrest.  An eye gel was used to establish airway. He was given 4 rounds of epinephrine during CPR and had ROSC on arrival.  Patient then became bradycardic and CPR is in seconds initiated, ROSC was achieved after 1 round and 1 mg of epi. Patient was started on epinephrine drip and intubated. Patient reportedly had hematemesis before intubation as well as brown/pink fluid return with his OG. He once again lost pulses and wanted to rest.  CPR was initiated and amp of bicarb was given and ROSC was obtained. Patient's hemoglobin was found to be 5.6. Imaging showed bilateral airspace disease, pleural effusions in the apices of the lungs, traumatic nondisplaced rib fractures, traumatic nondisplaced sternal fracture, substernal hematoma with active extravasation resting arterial bleed. 3/28: Epi drip was discontinued, dopamine drip started, sedation discontinued, arterial line placed in the right radial artery, patient placed on bicarb drip. Bedside EGD was done and showed active retropharyngeal bleeding, ENT was consulted. Octreotide drip, Protonix drip were discontinued.      Consults: Neuro crit care, palliative care, CT surgery, ENT, GI    OVERNIGHT EVENTS:      Family meeting occurred and patient was changed to DNR CCA, family states that they would like to wait till the 72-hour cristiane to make patient CC. NO acute event overnight, given 2 more units of blood      OBJECTIVE:     VITAL SIGNS:  Patient Vitals for the past 8 hrs:   BP Temp Temp src Pulse Resp SpO2 Weight   22 0800 (!) 120/55 98.2 °F (36.8 °C) Esophageal 70 20 91 %    22 0752    72 21 (!) 86 %    22 0715    69 20 96 %    22 0700    69 20 97 %    22 0626    75 23 100 %    22 0500    70 22 100 %    22 0449       254 lb 3.1 oz (115.3 kg)   22 0445    70 20 100 %    22 0430    68 18 100 %    22 0415    71 24 100 %    22 0400    74 22 100 %    22 0345    73 18 100 %    22 0330    73 19 100 %    22 0321    74 24 100 %    22 0315    73 17 100 %    22 0300 93/64   72 24 100 %    22 0245    71 23 100 %    22 0230    72 24 100 %    22 0215    71 18 100 %    22 0200 (!) 92/59   69 23 100 %    22 0145    69 22 100 %    22 0130    71 (!) 40 100 %    22 0115    70 24 100 %    22 0100 (!) 86/59   68 25 100 %    22 0045    68 26 100 %        Last Body weight:   Wt Readings from Last 3 Encounters:   22 254 lb 3.1 oz (115.3 kg)   17 180 lb (81.6 kg)       Body Mass Index : Body mass index is 37.54 kg/m². Tmax over 24 hours: Temp (24hrs), Av.3 °F (35.2 °C), Min:91.9 °F (33.3 °C), Max:100.2 °F (37.9 °C)      Ins/Outs: In: 4153.6 [I.V.:2419.3; Blood:605; NG/GT:527]  Out: 318 [Urine:168]    PHYSICAL EXAM:  Constitutional: Not following commands or responding to pain  EENT: Pinpoint pupils, sclera clear, anicteric, neck supple with midline trachea.   Neck: Supple, symmetrical, trachea midline, no adenopathy, thyroid symmetric, no jvd skin normal  Respiratory: Mechanical breath sounds  Cardiovascular: regular rate and rhythm, normal S1, S2, no murmur noted and 2+ pulses throughout  Abdomen: soft, nontender, distended,   Extremities:  peripheral pulses normal, bilateral lower extremity edema. no clubbing or cyanosis    MEDICATIONS:  Scheduled Meds:   insulin lispro  0-18 Units SubCUTAneous Q4H    ampicillin-sulbactam  3,000 mg IntraVENous Q6H    pantoprazole (PROTONIX) 40 mg injection  40 mg IntraVENous Q12H    rifaximin  200 mg Per NG tube TID    levetiracetam  1,000 mg IntraVENous Q12H    bisacodyl  20 mg Oral Once       Continuous Infusions:   sodium chloride      sodium bicarbonate infusion 50 mL/hr at 03/28/22 1957    dextrose      DOPamine 5 mcg/kg/min (03/29/22 0809)    midazolam Stopped (03/29/22 0258)    fentaNYL Stopped (03/29/22 0532)       PRN Meds:   sodium chloride, , PRN  ondansetron, 4 mg, Q8H PRN   Or  ondansetron, 4 mg, Q6H PRN  polyethylene glycol, 17 g, Daily PRN  glucose, 15 g, PRN  dextrose, 12.5 g, PRN  glucagon (rDNA), 1 mg, PRN  dextrose, 100 mL/hr, PRN        SUPPORT DEVICES: [x] Ventilator [] BIPAP  [] Nasal Cannula [] Room Air    VENT SETTINGS (Comprehensive) (if applicable):   PRVC mode, FiO2 45%, PEEP 8, Respiratory Rate 20, Tidal Volume 560  Vent Information  $Ventilation: $Subsequent Day  Skin Assessment: Clean, dry, & intact  Equipment ID: [id number]  Equipment Changed: Expiratory Filter  Vent Type: Servo i  Vent Mode: PRVC  Vt Ordered: 560 mL  Rate Set: 20 bmp  FiO2 : 45 %  SpO2: 91 %  SpO2/FiO2 ratio: 202.22  Sensitivity: 1  PEEP/CPAP: 8  I Time/ I Time %: 0.7 s  Humidification Source: HME  Nitric Oxide/Epoprostenol In Use?: No  Additional Respiratory  Assessments  Pulse: 70  Resp: 20  SpO2: 91 %  End Tidal CO2: 29 (%)  Position: Semi-Pennington's  Humidification Source: HME  Oral Care Completed?: Yes  Oral Care: Mouthwash with chlorhexidine,Teeth brushed,Suction toothette  Subglottic Suction Done?: No  Cuff Pressure (cm H2O):  (MLT)  Lab Results   Component Value Date    MODE [facility] 03/29/2022       ABGs:   Arterial Blood Gas result:  pH 7.416; pCO2 33.1; pO2 74.2; HCO3 21.3; BE3; %O2 Sat 95. No results found for: PH, PCO2, PO2, HCO3, O2SAT    DATA:  Complete Blood Count:   Recent Labs     03/28/22  1042 03/28/22  1042 03/28/22 1955 03/28/22 2302 03/29/22  0500   WBC 12.8*  --   --   --  15.1*   RBC 2.61*  --   --   --  2.96*   HGB 5.6*   < > 6.6* 7.4* 7.1*   HCT 20.0*   < > 22.1* 23.6* 22.0*   MCV 76.6*  --   --   --  74.3*   MCH 21.5*  --   --   --  24.0*   MCHC 28.0*  --   --   --  32.3   RDW 17.9*  --   --   --  18.6*     --   --   --  157   MPV 11.2  --   --   --  10.7    < > = values in this interval not displayed.         Last 3 Blood Glucose:   Recent Labs     03/28/22  1042 03/28/22  1404 03/28/22 2303 03/29/22  0500   GLUCOSE 130* 195* 221* 98        PT/INR:    Lab Results   Component Value Date    PROTIME 20.0 03/28/2022    INR 2.0 03/28/2022     PTT:    Lab Results   Component Value Date    APTT 50.9 03/28/2022       Basic Metabolic Profile:   Recent Labs     03/28/22  1404 03/28/22 2303 03/29/22  0500   * 134* 138   K 3.5* 3.7 3.7    101 104   CO2 10* 14* 18*   BUN 13 15 18   CREATININE 1.12 1.62* 2.08*   GLUCOSE 195* 221* 98       Liver Function:  Recent Labs     03/29/22  0500   PROT 5.7*   LABALBU 2.5*   ALT 41   AST 81*   ALKPHOS 82   BILITOT 2.08*       Magnesium:   Lab Results   Component Value Date    MG 2.2 03/28/2022     Phosphorus: No results found for: PHOS  Ionized Calcium:   Lab Results   Component Value Date    CAION 1.01 03/28/2022        Urinalysis:   Lab Results   Component Value Date    NITRU NEGATIVE 03/28/2022    COLORU Dark Yellow 03/28/2022    PHUR 5.5 03/28/2022    WBCUA 10 TO 20 03/28/2022    RBCUA 2 TO 5 03/28/2022    SPECGRAV 1.022 03/28/2022    LEUKOCYTESUR TRACE 03/28/2022    UROBILINOGEN Normal 03/28/2022    BILIRUBINUR NEGATIVE 03/28/2022    GLUCOSEU NEGATIVE 03/28/2022    KETUA NEGATIVE 03/28/2022       HgBA1c:  No results found for: LABA1C  TSH:  No results found for: TSH  Lactic Acid: No results found for: Ayesha Olmstead Troponin: No results for input(s): TROPONINI in the last 72 hours. Other Labs:  Results for orders placed or performed during the hospital encounter of 03/28/22   COVID-19, Rapid    Specimen: Nasopharyngeal Swab   Result Value Ref Range    Specimen Description . NASOPHARYNGEAL SWAB     SARS-CoV-2, Rapid Not Detected Not Detected   Culture, Respiratory    Specimen: Sputum, Suctioned   Result Value Ref Range    Specimen Description . SUCTIONED SPUTUM     Direct Exam < 10 EPITHELIAL CELLS/LPF     Direct Exam >10, <25 NEUTROPHILS/LPF     Direct Exam (A)      PREDOMINANT ORGANISM: GRAM POSITIVE COCCI IN CLUSTERS    Culture PENDING    CBC with Auto Differential   Result Value Ref Range    WBC 12.8 (H) 3.5 - 11.3 k/uL    RBC 2.61 (L) 4.21 - 5.77 m/uL    Hemoglobin 5.6 (LL) 13.0 - 17.0 g/dL    Hematocrit 20.0 (L) 40.7 - 50.3 %    MCV 76.6 (L) 82.6 - 102.9 fL    MCH 21.5 (L) 25.2 - 33.5 pg    MCHC 28.0 (L) 28.4 - 34.8 g/dL    RDW 17.9 (H) 11.8 - 14.4 %    Platelets 925 915 - 597 k/uL    MPV 11.2 8.1 - 13.5 fL    NRBC Automated 0.8 (H) 0.0 per 100 WBC    Immature Granulocytes 4 (H) 0 %    Seg Neutrophils 52 36 - 66 %    Lymphocytes 35 24 - 44 %    Monocytes 7 1 - 7 %    Eosinophils % 1 1 - 4 %    Basophils 1 0 - 2 %    nRBC 1 (H) 0 per 100 WBC    Absolute Immature Granulocyte 0.51 (H) 0.00 - 0.30 k/uL    Segs Absolute 6.65 1.8 - 7.7 k/uL    Absolute Lymph # 4.48 1.0 - 4.8 k/uL    Absolute Mono # 0.90 (H) 0.1 - 0.8 k/uL    Absolute Eos # 0.13 0.0 - 0.4 k/uL    Basophils Absolute 0.13 0.0 - 0.2 k/uL    Morphology ANISOCYTOSIS PRESENT     Morphology MICROCYTOSIS PRESENT     Morphology HYPOCHROMIA PRESENT     Morphology 1+ POLYCHROMASIA    Comprehensive Metabolic Panel w/ Reflex to MG   Result Value Ref Range    Glucose 130 (H) 70 - 99 mg/dL    BUN 12 8 - 23 mg/dL    CREATININE 0.96 0.70 - 1.20 mg/dL    Calcium 8.0 (L) 8.6 - 10.4 mg/dL    Sodium 136 135 - 144 mmol/L    Potassium 4.1 3.7 - 5.3 mmol/L    Chloride 108 (H) 98 - 107 mmol/L    CO2 12 (L) 20 - 31 mmol/L    Anion Gap 16 9 - 17 mmol/L    Alkaline Phosphatase 101 40 - 129 U/L    ALT 16 5 - 41 U/L    AST 38 <40 U/L    Total Bilirubin 0.89 0.3 - 1.2 mg/dL    Total Protein 6.0 (L) 6.4 - 8.3 g/dL    Albumin 2.6 (L) 3.5 - 5.2 g/dL    Albumin/Globulin Ratio 0.8 (L) 1.0 - 2.5    GFR Non-African American >60 >60 mL/min    GFR African American >60 >60 mL/min    GFR Comment         Troponin   Result Value Ref Range    Troponin, High Sensitivity 12 0 - 22 ng/L   Brain Natriuretic Peptide   Result Value Ref Range    Pro-BNP 1,512 (H) <300 pg/mL   Protime-INR   Result Value Ref Range    Protime 16.3 (H) 9.1 - 12.3 sec    INR 1.6    APTT   Result Value Ref Range    PTT 81.1 (H) 20.5 - 30.5 sec   Lactic Acid   Result Value Ref Range    Lactic Acid, Whole Blood 11.2 (H) 0.7 - 2.1 mmol/L   Urinalysis with Microscopic   Result Value Ref Range    Color, UA Dark Yellow (A) Yellow    Turbidity UA Cloudy (A) Clear    Glucose, Ur NEGATIVE NEGATIVE    Bilirubin Urine NEGATIVE NEGATIVE    Ketones, Urine NEGATIVE NEGATIVE    Specific Gravity, UA 1.022 1.005 - 1.030    Urine Hgb MODERATE (A) NEGATIVE    pH, UA 5.5 5.0 - 8.0    Protein, UA 2+ (A) NEGATIVE    Urobilinogen, Urine Normal Normal    Nitrite, Urine NEGATIVE NEGATIVE    Leukocyte Esterase, Urine TRACE (A) NEGATIVE    -          WBC, UA 10 TO 20 0 - 5 /HPF    RBC, UA 2 TO 5 0 - 4 /HPF    Casts UA  0 - 8 /LPF     20 TO 50 Reference range defined for non-centrifuged specimen.     Epithelial Cells UA 10 TO 20 0 - 5 /HPF   Blood Gas, Venous   Result Value Ref Range    pH, Jez 6.850 (LL) 7.320 - 7.420    pCO2, Jez 78.2 (H) 39 - 55    pO2, Jez 92.2 (H) 30 - 50    HCO3, Venous 12.9 (L) 24 - 30 mmol/L    Negative Base Excess, Jez 19.0 (H) 0.0 - 2.0 mmol/L    O2 Sat, Jez 85.9 (H) 60.0 - 85.0 %    Carboxyhemoglobin 2.3 0 - 5 %    Pt Temp 37.0     FIO2 INFORMATION NOT PROVIDED    ELECTROLYTES PLUS   Result Value Ref Range    POC Sodium 141 138 - 146 mmol/L POC Potassium 4.1 3.5 - 4.5 mmol/L    POC Chloride 108 (H) 98 - 107 mmol/L    POC TCO2 17 (L) 22 - 30 mmol/L    Anion Gap 17 (H) 7 - 16 mmol/L   Hemoglobin and hematocrit, blood   Result Value Ref Range    POC Hemoglobin 7.7 (L) 13.5 - 17.5 g/dL    POC Hematocrit 23 (L) 41 - 53 %   CALCIUM, IONIC (POC)   Result Value Ref Range    POC Ionized Calcium 1.22 1.15 - 1.33 mmol/L   TOX SCR, BLD, ED   Result Value Ref Range    Acetaminophen Level <5 (L) 10 - 30 ug/mL    Ethanol <10 <10 mg/dL    Ethanol percent <5.593 <3.111 %    Salicylate Lvl <1 (L) 3 - 10 mg/dL    Toxic Tricyclic Sc,Blood NEGATIVE NEGATIVE   ELECTROLYTES PLUS   Result Value Ref Range    POC Sodium 141 138 - 146 mmol/L    POC Potassium 3.6 3.5 - 4.5 mmol/L    POC Chloride 106 98 - 107 mmol/L    POC TCO2 15 (L) 22 - 30 mmol/L    Anion Gap 21 (H) 7 - 16 mmol/L   Hemoglobin and hematocrit, blood   Result Value Ref Range    POC Hemoglobin 7.2 (L) 13.5 - 17.5 g/dL    POC Hematocrit 21 (L) 41 - 53 %   CALCIUM, IONIC (POC)   Result Value Ref Range    POC Ionized Calcium 1.14 (L) 1.15 - 1.33 mmol/L   Basic Metabolic Panel w/ Reflex to MG   Result Value Ref Range    Glucose 195 (H) 70 - 99 mg/dL    BUN 13 8 - 23 mg/dL    CREATININE 1.12 0.70 - 1.20 mg/dL    Calcium 7.5 (L) 8.6 - 10.4 mg/dL    Sodium 134 (L) 135 - 144 mmol/L    Potassium 3.5 (L) 3.7 - 5.3 mmol/L    Chloride 104 98 - 107 mmol/L    CO2 10 (L) 20 - 31 mmol/L    Anion Gap 20 (H) 9 - 17 mmol/L    GFR Non-African American >60 >60 mL/min    GFR African American >60 >60 mL/min    GFR Comment         Hepatic function panel   Result Value Ref Range    Albumin 2.5 (L) 3.5 - 5.2 g/dL    Alkaline Phosphatase 99 40 - 129 U/L    ALT 35 5 - 41 U/L    AST 88 (H) <40 U/L    Total Bilirubin 1.34 (H) 0.3 - 1.2 mg/dL    Bilirubin, Direct 0.87 (H) <0.31 mg/dL    Bilirubin, Indirect 0.47 0.00 - 1.00 mg/dL    Total Protein 5.7 (L) 6.4 - 8.3 g/dL    Albumin/Globulin Ratio 0.8 (L) 1.0 - 2.5   Calcium, Ionized   Result Value Ref Range    Calcium, Ion 1.01 (L) 1.13 - 1.33 mmol/L   Troponin   Result Value Ref Range    Troponin, High Sensitivity 42 (H) 0 - 22 ng/L   Protime-INR   Result Value Ref Range    Protime 20.0 (H) 9.1 - 12.3 sec    INR 2.0    APTT   Result Value Ref Range    PTT 50.9 (H) 20.5 - 30.5 sec   Ammonia   Result Value Ref Range    Ammonia 64 (H) 16 - 60 umol/L   Magnesium   Result Value Ref Range    Magnesium 2.2 1.6 - 2.6 mg/dL   Ammonia   Result Value Ref Range    Ammonia 65 (H) 16 - 60 umol/L   Basic Metabolic Panel w/ Reflex to MG   Result Value Ref Range    Glucose 98 70 - 99 mg/dL    BUN 18 8 - 23 mg/dL    CREATININE 2.08 (H) 0.70 - 1.20 mg/dL    Calcium 7.3 (L) 8.6 - 10.4 mg/dL    Sodium 138 135 - 144 mmol/L    Potassium 3.7 3.7 - 5.3 mmol/L    Chloride 104 98 - 107 mmol/L    CO2 18 (L) 20 - 31 mmol/L    Anion Gap 16 9 - 17 mmol/L    GFR Non-African American 32 (L) >60 mL/min    GFR  39 (L) >60 mL/min    GFR Comment         CBC with Auto Differential   Result Value Ref Range    WBC 15.1 (H) 3.5 - 11.3 k/uL    RBC 2.96 (L) 4.21 - 5.77 m/uL    Hemoglobin 7.1 (L) 13.0 - 17.0 g/dL    Hematocrit 22.0 (L) 40.7 - 50.3 %    MCV 74.3 (L) 82.6 - 102.9 fL    MCH 24.0 (L) 25.2 - 33.5 pg    MCHC 32.3 28.4 - 34.8 g/dL    RDW 18.6 (H) 11.8 - 14.4 %    Platelets 105 180 - 441 k/uL    MPV 10.7 8.1 - 13.5 fL    NRBC Automated 0.1 (H) 0.0 per 100 WBC    Immature Granulocytes 0 0 %    Seg Neutrophils 83 (H) 36 - 66 %    Lymphocytes 10 (L) 24 - 44 %    Monocytes 7 1 - 7 %    Eosinophils % 0 (L) 1 - 4 %    Basophils 0 0 - 2 %    Absolute Immature Granulocyte 0.00 0.00 - 0.30 k/uL    Segs Absolute 12.53 (H) 1.8 - 7.7 k/uL    Absolute Lymph # 1.51 1.0 - 4.8 k/uL    Absolute Mono # 1.06 (H) 0.1 - 0.8 k/uL    Absolute Eos # 0.00 0.0 - 0.4 k/uL    Basophils Absolute 0.00 0.0 - 0.2 k/uL    Morphology ANISOCYTOSIS PRESENT     Morphology MICROCYTOSIS PRESENT     Morphology 1+ POLYCHROMASIA    Hemoglobin and Hematocrit   Result Value Ref Range    Hemoglobin 6.6 (LL) 13.0 - 17.0 g/dL    Hematocrit 22.1 (L) 40.7 - 50.3 %   Hepatic function panel   Result Value Ref Range    Albumin 2.5 (L) 3.5 - 5.2 g/dL    Alkaline Phosphatase 82 40 - 129 U/L    ALT 41 5 - 41 U/L    AST 81 (H) <40 U/L    Total Bilirubin 2.08 (H) 0.3 - 1.2 mg/dL    Bilirubin, Direct 0.78 (H) <0.31 mg/dL    Bilirubin, Indirect 1.30 (H) 0.00 - 1.00 mg/dL    Total Protein 5.7 (L) 6.4 - 8.3 g/dL    Albumin/Globulin Ratio 0.8 (L) 1.0 - 2.5   Lactic Acid   Result Value Ref Range    Lactic Acid, Whole Blood 11.4 (H) 0.7 - 2.1 mmol/L   Lactic Acid   Result Value Ref Range    Lactic Acid, Whole Blood 5.3 (H) 0.7 - 2.1 mmol/L   Troponin   Result Value Ref Range    Troponin, High Sensitivity 83 (HH) 0 - 22 ng/L   Troponin   Result Value Ref Range    Troponin, High Sensitivity 94 (HH) 0 - 22 ng/L   Lactic Acid   Result Value Ref Range    Lactic Acid, Whole Blood 3.7 (H) 0.7 - 2.1 mmol/L   Hemoglobin and Hematocrit   Result Value Ref Range    Hemoglobin 7.4 (L) 13.0 - 17.0 g/dL    Hematocrit 23.6 (L) 40.7 - 50.3 %   Basic Metabolic Panel   Result Value Ref Range    Glucose 221 (H) 70 - 99 mg/dL    BUN 15 8 - 23 mg/dL    CREATININE 1.62 (H) 0.70 - 1.20 mg/dL    Calcium 7.3 (L) 8.6 - 10.4 mg/dL    Sodium 134 (L) 135 - 144 mmol/L    Potassium 3.7 3.7 - 5.3 mmol/L    Chloride 101 98 - 107 mmol/L    CO2 14 (L) 20 - 31 mmol/L    Anion Gap 19 (H) 9 - 17 mmol/L    GFR Non-African American 43 (L) >60 mL/min    GFR  52 (L) >60 mL/min    GFR Comment         Troponin   Result Value Ref Range    Troponin, High Sensitivity 108 (HH) 0 - 22 ng/L   Venous Blood Gas, POC   Result Value Ref Range    pH, Jez 6.772 (LL) 7.320 - 7.430    pCO2, Jez 96.4 (HH) 41.0 - 51.0 mm Hg    pO2, Jez 40.4 30.0 - 50.0 mm Hg    HCO3, Venous 14.1 (L) 22.0 - 29.0 mmol/L    Negative Base Excess, Jez 20 (H) 0.0 - 2.0    O2 Sat, Jez 34 (L) 60.0 - 85.0 %   Creatinine W/GFR Point of Care   Result Value Ref Range POC Creatinine 1.22 (H) 0.51 - 1.19 mg/dL    GFR Comment >60 >60 mL/min    GFR Non- 59 (L) >60 mL/min    GFR Comment         POCT urea (BUN)   Result Value Ref Range    POC BUN 12 8 - 26 mg/dL   Lactic Acid, POC   Result Value Ref Range    POC Lactic Acid 10.97 (H) 0.56 - 1.39 mmol/L   POCT Glucose   Result Value Ref Range    POC Glucose 130 (H) 74 - 100 mg/dL   Arterial Blood Gas, POC   Result Value Ref Range    POC pH 7.098 (LL) 7.350 - 7.450    POC pCO2 44.3 35.0 - 48.0 mm Hg    POC PO2 71.5 (L) 83.0 - 108.0 mm Hg    POC HCO3 13.7 (L) 21.0 - 28.0 mmol/L    Negative Base Excess, Art 15 (H) 0.0 - 2.0    POC O2 SAT 87 (L) 94.0 - 98.0 %    O2 Device/Flow/% Adult Ventilator     Sample Site Right Radial Artery     FIO2 100.0    Creatinine W/GFR Point of Care   Result Value Ref Range    POC Creatinine 1.19 0.51 - 1.19 mg/dL    GFR Comment >60 >60 mL/min    GFR Non-African American >60 >60 mL/min    GFR Comment         POCT urea (BUN)   Result Value Ref Range    POC BUN 11 8 - 26 mg/dL   Lactic Acid, POC   Result Value Ref Range    POC Lactic Acid 11.73 (H) 0.56 - 1.39 mmol/L   POCT Glucose   Result Value Ref Range    POC Glucose 131 (H) 74 - 100 mg/dL   POC Glucose Fingerstick   Result Value Ref Range    POC Glucose 180 (H) 75 - 110 mg/dL   POC Glucose Fingerstick   Result Value Ref Range    POC Glucose 208 (H) 75 - 110 mg/dL   Arterial Blood Gas, POC   Result Value Ref Range    POC pH 7.304 (L) 7.350 - 7.450    POC pCO2 25.1 (L) 35.0 - 48.0 mm Hg    POC PO2 376.2 (H) 83.0 - 108.0 mm Hg    POC HCO3 12.5 (L) 21.0 - 28.0 mmol/L    Negative Base Excess, Art 13 (H) 0.0 - 2.0    POC O2  (H) 94.0 - 98.0 %    Nagi Test NOT APPLICABLE     Sample Site Arterial Line     FIO2 100.0     Pt Temp 34.5     POC pH Temp 7.34     POC pCO2 Temp 23 mm Hg    POC pO2 Temp 363 mm Hg   Arterial Blood Gas, POC   Result Value Ref Range    POC pH 7.310 (L) 7.350 - 7.450    POC pCO2 26.6 (L) 35.0 - 48.0 mm Hg    POC PO2 95.2 83.0 - 108.0 mm Hg    POC HCO3 13.4 (L) 21.0 - 28.0 mmol/L    Negative Base Excess, Art 12 (H) 0.0 - 2.0    POC O2 SAT 97 94.0 - 98.0 %    O2 Device/Flow/% Adult Ventilator     Nagi Test NOT APPLICABLE     Sample Site Arterial Line     Mode PRVC     FIO2 50.0     Pt Temp 35.4     POC pH Temp 7.33     POC pCO2 Temp 25 mm Hg    POC pO2 Temp 86 mm Hg   Lactic Acid, POC   Result Value Ref Range    POC Lactic Acid 10.45 (H) 0.56 - 1.39 mmol/L   POCT Glucose   Result Value Ref Range    POC Glucose 238 (H) 74 - 100 mg/dL   POC Glucose Fingerstick   Result Value Ref Range    POC Glucose 174 (H) 75 - 110 mg/dL   Arterial Blood Gas, POC   Result Value Ref Range    POC pH 7.406 7.350 - 7.450    POC pCO2 28.9 (L) 35.0 - 48.0 mm Hg    POC PO2 93.7 83.0 - 108.0 mm Hg    POC HCO3 18.2 (L) 21.0 - 28.0 mmol/L    Negative Base Excess, Art 6 (H) 0.0 - 2.0    POC O2 SAT 98 94.0 - 98.0 %    O2 Device/Flow/% Adult Ventilator     Nagi Test NOT APPLICABLE     Sample Site Arterial Line     Mode PRVC     FIO2 50.0     Pt Temp 38.0     POC pH Temp 7.39     POC pCO2 Temp 30 mm Hg    POC pO2 Temp 100 mm Hg   Lactic Acid, POC   Result Value Ref Range    POC Lactic Acid 6.10 (H) 0.56 - 1.39 mmol/L   POCT Glucose   Result Value Ref Range    POC Glucose 200 (H) 74 - 100 mg/dL   POC Glucose Fingerstick   Result Value Ref Range    POC Glucose 103 75 - 110 mg/dL   Arterial Blood Gas, POC   Result Value Ref Range    POC pH 7.416 7.350 - 7.450    POC pCO2 33.1 (L) 35.0 - 48.0 mm Hg    POC PO2 74.2 (L) 83.0 - 108.0 mm Hg    POC HCO3 21.3 21.0 - 28.0 mmol/L    Negative Base Excess, Art 3 (H) 0.0 - 2.0    POC O2 SAT 95 94.0 - 98.0 %    O2 Device/Flow/% Adult Ventilator     Nagi Test NOT APPLICABLE     Sample Site Arterial Line     Mode PRVC     FIO2 50.0     Pt Temp 38.3     POC pH Temp 7.40     POC pCO2 Temp 35 mm Hg    POC pO2 Temp 81 mm Hg   POC Glucose Fingerstick   Result Value Ref Range    POC Glucose 82 75 - 110 mg/dL   TYPE AND SCREEN Result Value Ref Range    Expiration Date 03/31/2022,2359     Arm Band Number [id number]     ABO/Rh O POSITIVE     Antibody Screen NEGATIVE     Unit Number [id number]     Product Code Leukocyte Reduced Red Cell     Unit Divison 00     Dispense Status TRANSFUSED     Transfusion Status OK TO TRANSFUSE     Crossmatch Result COMPATIBLE     Unit Number [id number]     Product Code Leukocyte Reduced Red Cell     Unit Divison 00     Dispense Status REL FROM [facility]     Transfusion Status OK TO TRANSFUSE     Crossmatch Result COMPATIBLE     Unit Number [id number]     Product Code Leukocyte Reduced Red Cell     Unit Divison 00     Dispense Status TRANSFUSED     Transfusion Status OK TO TRANSFUSE     Crossmatch Result COMPATIBLE     Unit Number [id number]     Product Code Leukocyte Reduced Red Cell     Unit Divison 00     Dispense Status TRANSFUSED     Transfusion Status OK TO TRANSFUSE     Crossmatch Result COMPATIBLE     Unit Number [id number]     Product Code Leukocyte Reduced Red Cell     Unit Divison 00     Dispense Status ISSUED     Transfusion Status OK TO TRANSFUSE     Crossmatch Result COMPATIBLE        Radiology/Imaging:  CT CHEST WO CONTRAST   Final Result   1. Interval decrease in size of substernal hematoma. 2.  Increasing right pleural effusion, now moderate in size. Blood products   in this area appear extrapleural.  Similar appearance of low-density left   pleural effusion. 3.  Redemonstration of sternal and bilateral rib fractures with overlying   subcutaneous edema. 4.  Cirrhotic liver morphology and ascites. XR ABDOMEN FOR NG/OG/NE TUBE PLACEMENT   Preliminary Result   Enteric tube looped within the body of the stomach with the tip directed   superiorly towards the fundus of the stomach. XR ABDOMEN FOR NG/OG/NE TUBE PLACEMENT   Final Result   No change in position of the tip of endotracheal tube.   If this has been   advanced., Question it may be coiled off the field of view of the   radiograph. .  If warranted, consider chest x-ray or soft tissue neck imaging. Otherwise, consider advancement 10-15 cm. The findings were sent to the Radiology Results [address] at 1:09   am on 3/29/2022to be communicated to a licensed caregiver. XR ABDOMEN FOR NG/OG/NE TUBE PLACEMENT   Final Result   Enteric tube terminating proximal to the GE junction. This should be   advanced approximately 10 cm, and reimaged. XR ABDOMEN FOR NG/OG/NE TUBE PLACEMENT   Final Result   Enteric tube terminates the level of the GE junction. This needs advanced   least 10 cm. The findings were sent to the Radiology [facility] [address] at 8:49   pm on 3/28/2022to be communicated to a licensed caregiver. CTA CHEST W WO CONTRAST   Final Result   Acute traumatic nondisplaced to minimally displaced fractures of the right   anterior 3rd, 4th, 7th and 8th ribs, left anterior 2nd, 3rd, 4th, 5th, 6th   and 7th ribs as well as the right anterior 4th costochondral cartilage. Acute traumatic nondisplaced fracture of the body of the sternum. Substernal hematoma with small focus of active extravasation of contrast   suggesting arterial bleeding. Hematomas in the subpleural space deep to the right costochondral cartilage   fractures and anterior rib fractures with foci of active extravasation of   contrast suggesting active bleeding from the right internal thoracic artery. Bilateral pleural effusions. Right perihilar upper lobe and lower lobe ground-glass opacities suggesting   pulmonary contusions. No pneumothorax. No acute traumatic injury of the thoracic aorta. Upper abdominal ascites. CTA ABDOMEN PELVIS W CONTRAST   Preliminary Result   No acute traumatic injury of the abdomen or pelvis. Findings of hepatic cirrhosis with abdominal and pelvic ascites possibly   related to the cirrhosis.       Mildly prominent loops of bowel throughout the abdomen and pelvis with   mucosal enhancement. This may be secondary to mild ileus or shock bowel. No   evidence of bowel obstruction. No acute traumatic injury of the aorta. No active extravasation of contrast.      See separate CT of the chest for discussion of those findings. CT Head WO Contrast   Final Result   No acute intracranial abnormality. CT Cervical Spine WO Contrast   Final Result   No acute traumatic injury of the cervical spine. Mild multilevel   degenerative changes. Pleural effusions in the lung apices. XR CHEST PORTABLE   Final Result   1. Endotracheal tube terminates 2 cm above the matthew. Enteric tube   terminates at the body of the stomach. 2.  Bilateral airspace disease, which may represent inflammatory process or   multifocal infection.              ASSESSMENT:     Patient Active Problem List    Diagnosis Date Noted    Cardiac arrest ([facility] 03/28/2022    Acute blood loss anemia     Hematemesis     Alcoholic cirrhosis of liver with ascites (HCC)     Alcohol intoxication in alcoholism with blood level over 0.3 without complication ([facility] 01/24/8592        PLAN:      WEAN PER PROTOCOL:  []   No  [x]   Yes []   N/A                         ICU PROPHYLAXIS:                Stress ulcer:  [x]   PPI Agent []   [id number] []   Sucralfate []   Other []   None      VTE:  []   Enoxaparin []   SQ Heparin []   EPC Cuffs []  Other                       NUTRITION:   [x]  NPO []   TF:  []   TPN []   PO                       HOME MEDS RECONCILED:  []   No  []   Yes                           CONSULTATION NEEDED:  []   No  [x]   Yes                           FAMILY UPDATED:  []   No  [x]   Yes                           TRANSFER OUT OF ICU:  [x]   No  []   Yes             PLAN/MEDICAL DECISION MAKING:  Neurologic:   · Neuro crit care consult  · LTME  · MRI at 72h  · Neuro checks per protocol  · Versed 2 mg/hr  · Palliative care consulted  Cardiovascular:  · Hemodynamically stable  · MAP goal 65  · Dopamine drip at 4mcg  · CT surgery on board   · Repeat CT showed decreased substernal hematoma   · Hgb> 7  · Cardiology on board, recommend ischemia work up if meaningful neurological recovery  · Bicarb drip  · Troponin elevated to 108  · Echo F/u read  Pulmonary:  · Maintain oxygen sats >92%  · Pulmonary toilet  · Vent Information  · $Ventilation: $Subsequent Day  · Skin Assessment: Clean, dry, & intact  · Equipment ID: [id number]  · Equipment Changed: Expiratory Filter  · Vent Type: Servo i  · Vent Mode: PRVC  · Vt Ordered: 560 mL  · Rate Set: 20 bmp  · FiO2 : 45 %  · SpO2: 91 %  · SpO2/FiO2 ratio: 202.22  · Sensitivity: 1  · PEEP/CPAP: 8  · I Time/ I Time %: 0.7 s  · Humidification Source: HME  · Nitric Oxide/Epoprostenol In Use?: No   · Increasing right sided pleural effusion, moderate in size, no intervention at this time. GI/Nutrition  · Ulcer Prophylaxis: PPI   · Diet:Diet NPO   · GI consult  · Bedside endoscopy done yesterday, no varices or acute bleeding seen  · ENT consult recommended  · Colonoscopy today  Renal/Fluid/Electrolyte  · I/O: In: 4153.6 [I.V.:2419.3; Blood:605; NG/GT:527]  · Out: 318 [Urine:168]  · UOP: 0.1 cc/kg/hr  · Monitor electrolytes, replace PRN   ID  WBC:    Lab Results   Component Value Date    WBC 15.1 (H) 2022   ·   · Tmax: Temp (24hrs), Av.3 °F (35.2 °C), Min:91.9 °F (33.3 °C), Max:100.2 °F (37.9 °C)  · Unasyn for aspiration pneumonia   Hematology:   Recent Labs     22  1955 22  0500   HGB 6.6* 7.4* 7.1*   ·   4 units PRBC  Endocrine:   glucose controlled - most recent BGL is    Recent Labs     22  1404 22  2303 03/29/22  0500   GLUCOSE 195* 221* 98   ·   DVT Prophylaxis  · No chemoprophylaxis anticoagulation at this time.   Discharge Needs:  PT, OT, ST, SW and Case Management      CODE STATUS: DNR-CCA      DISPOSITION:  [x] To remain ICU: Intubated  [] OK for out of ICU from [address] [place] [address], MD  Emergency Medicine Resident  [address]  3/29/2022, 8:38 AM EDT  Attending Physician Statement  I have discussed the care of Joseph Alarcon, including pertinent history and exam findings,  with the resident. I have seen and examined the patient and the key elements of all parts of the encounter have been performed by me. I agree with the assessment, plan and orders as documented by the resident with additions . PEA arrest.  Cardiogenic shock  Acute hypoxic respiratory failure  Prolonged CPR  Multiple rib fractures and sternal fractures due to CPR  Retrosternal hematoma due to prolonged CPR  Question upper GI bleed -ruled out esophageal variceal bleed   Bilateral pleural effusion  Acute anemia of blood loss. Cirrhosis of liver with ascites  Chronic alcohol use  Anion gap metabolic acidosis with respiratory acidosis  Aspiration pneumonia bilaterally   ROMAN  Imaging reviewed  Discussed with cardiology and neuro critical care  Patient is currently undergoing LTM E. He is on Versed drip and antiepileptic drugs  Worsening ROMAN  Monitor renal function  Continue bicarbonate drip  Continue ventilator support    Family updated    Total critical care time caring for this patient with life threatening, unstable organ failure, including direct patient contact, management of life support systems, review of data including imaging and labs, discussions with other team members and physicians at least 27   Min so far today, excluding procedures. Treatment plan Discussed with nursing staff in detail , all questions answered . Electronically signed by Vi Lockhart MD on   3/29/22 at 2:53 PM EDT    Please note that this chart was generated using voice recognition Dragon dictation software.   Although every effort was made to ensure the accuracy of this automated transcription, some errors in transcription may have occurred.

## 2022-03-29 NOTE — PROGRESS NOTES
[facility]   Progress Note    Ayla Joya is a 78 y.o. male patient. Hospitalization Day:1      Chief consult reason:     Upper GI bleeding and bright red blood per rectum    Subjective:  Patient seen and examined earlier this a.m. .  Abdomen is slightly distended compared to yesterday. Patient has received all of the bowel prep with no bowel movements in response. No rectal bleeding noted overnight per staff      VITALS:  BP (!) 127/52   Pulse 73   Temp 98.4 °F (36.9 °C) (Esophageal)   Resp 22   Ht 5' 9\" (1.753 m)   Wt 254 lb 3.1 oz (115.3 kg)   SpO2 94%   BMI 37.54 kg/m²   TEMPERATURE:  Current - Temp: 98.4 °F (36.9 °C); Max - Temp  Av.6 °F (35.9 °C)  Min: 92.7 °F (33.7 °C)  Max: 100.2 °F (37.9 °C)    Physical Assessment:  General appearance: Intubated unresponsive  Mental Status:  JOSE  Lungs:DM in bases  Heart:  regular rate and rhythm, no murmur  Abdomen:  soft, nontender, very distended, normal bowel sounds, no masses, hepatomegaly, splenomegaly  Extremities:  no edema, redness, tenderness in the calves  Skin:  no gross lesions, rashes, induration    Data Review:    Labs and Imaging:     CBC:  Recent Labs     22  1042 22  1955 22  2302 22  0500 22  1045   WBC 12.8*  --   --  15.1*  --    HGB 5.6* 6.6* 7.4* 7.1* 8.3*   MCV 76.6*  --   --  74.3*  --    RDW 17.9*  --   --  18.6*  --      --   --  157  --        ANEMIA STUDIES:  No results for input(s): LABIRON, TIBC, FERRITIN, [id number], FOLATE, OCCULTBLD in the last 72 hours.     BMP:  Recent Labs     22  1404 22  2303 22  0500   * 134* 138   K 3.5* 3.7 3.7    101 104   CO2 10* 14* 18*   BUN 13 15 18   CREATININE 1.12 1.62* 2.08*   GLUCOSE 195* 221* 98   CALCIUM 7.5* 7.3* 7.3*       LFTS:  Recent Labs     22  1042 22  1404 22  0500   ALKPHOS 101 99 82   ALT 16 35 41   AST 38 88* 81*   BILITOT 0.89 1.34* 2.08*   BILIDIR  --  0.87* 0.78*   LABALBU 2. 6* 2.5* 2.5*       Amylase/Lipase and Ammonia:  Recent Labs     03/28/22  1404 03/29/22  0500   AMMONIA 64* 65*       Acute Hepatitis Panel:  No results found for: HEPBSAG, HEPCAB, HEPBIGM, HEPAIGM    HCV Genotype:  No results found for: HEPATITISCGENOTYPE    HCV Quantitative:  No results found for: HCVQNT    LIVER WORK UP:    AFP  No results found for: AFP    Alpha 1 antitrypsin   No results found for: A1A    JUAN JOSÉ  No results found for: JUAN JOSÉ    AMA  No results found for: MITOAB    ASMA  No results found for: SMOOTHMUSCAB    PT/INR  Recent Labs     03/28/22  1042 03/28/22  1404   PROTIME 16.3* 20.0*   INR 1.6 2.0       Cancer Markers:  CEA:  No results for input(s): CEA in the last 72 hours. Ca 125:  No results for input(s):  in the last 72 hours. Ca 19-9:   Invalid input(s):   AFP: No results for input(s): AFP in the last 72 hours. Lactic acid:Invalid input(s): LACTIC ACID    Radiology Review:    No results found. Principal Problem:    Cardiac arrest [facility])  Active Problems:    Acute blood loss anemia    Hematemesis    Alcoholic cirrhosis of liver with ascites (HCC)  Resolved Problems:    * No resolved hospital problems. *       GI Impression:    1. S/p cardiac arrest  2. Cardiogenic shock    3. Oropharyngeal bleeding  4. Acute hypoxic respiratory failure  5. Acute blood loss anemia  6. Metabolic acidosis  7. Decompensated alcoholic cirrhosis    Plan and Recommendations:    1. Recommend CT abdomen and pelvis to further assess for causes of abdominal distention etc  2. No colonoscopy indicated at this time given lack of overt GI bleeding  3. Recommend supportive care with IV fluids, antibiotics ATC per primary  4. We will follow      This plan was formulated in collaboration with Dr. Ken Larios MD    Thank you for allowing me to participate in the care of your patient. Please feel free to contact me with any questions or concerns.      [address] [place] [address] Gastroenterology    This note was created with the assistance of a speech-recognition program.  Although the intention is to generate a document that actually reflects the content of the visit, no guarantees can be provided that every mistake has been identified and corrected by editing.

## 2022-03-29 NOTE — ACP (ADVANCE CARE PLANNING)
Advance Care Planning     Advance Care Planning (ACP) Physician/NP/PA Conversation    Date of Conversation: 3/29/2022  Conducted with: Patient on ventilator and unable to make decisions or participate in conversation    Healthcare Decision Maker:  Patient is a  and his biological son is  also. His parents are  and he has 9 biological siblings. They are [place] Sacks. Patient also has step son Rajesh Castillo. Patient has not completed DPOA or living will paperwork so by [place] law the majority of the siblings would determine patient plan of care. Sierra Vallecillo requests to be primary contact for patient for updates. Care Preferences:    Hospitalization: \"If your health worsens and it becomes clear that your chance of recovery is unlikely, what would be your preference regarding hospitalization? \"  Hospitalized     Ventilation: \"If you were unable to breath on your own and your chance of recovery was unlikely, what would be your preference about the use of a ventilator (breathing machine) if it was available to you? \"  DNRCC-A     Resuscitation: \"In the event your heart stopped as a result of an underlying serious health condition, would you want attempts made to restart your heart, or would you prefer a natural death? \"  DNRCC-A    Conversation Outcomes / Follow-Up Plan:  Palliative Interaction:  Went to bedside and spoke to patient brother Sierra Vallecillo and introduced myself and my palliative care role.     Patient is a  and his biological son is  also. His parents are  and he has 9 biological siblings. They are [place] Sacks. Patient also has step son Rajesh Castillo. Patient has not completed DPOA or living will paperwork so by [place] law the majority of the siblings would determine patient plan of care.  Sierra Pruetters requests to be primary contact for patient for updates.     CC team spoke with Sierra Vallecillo and siblings yesterday and changed patient code status to a DNRCC-A. Leo Pak patient brother states patient was a private person and would not want this.      Patient lives with his step son Parvin Arroyo. 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      Emotional support provided to Leo Pak and Palliative care will continue to follow.     Length of Voluntary ACP Conversation in minutes:  16 minutes    KATIE Clark - NP

## 2022-03-29 NOTE — CONSULTS
Palliative Care Inpatient Consult    NAME:  Barrera So  MEDICAL RECORD NUMBER:  [id number]  AGE: 78 y.o. GENDER: male  : 1955  TODAY'S DATE:  3/29/2022    Reasons for Consultation:    Goals of care   Family support     Members of PC team contributing to this consultation are :  Afshan Vásquez CNP  Palliative Care   Plan      Palliative Interaction:  Went to bedside and spoke to patient brother Rukhsana Pedro and introduced myself and my palliative care role. Patient is a  and his biological son is  also. His parents are  and he has 9 biological siblings. They are Moustapha Triana. Patient also has step son Pual Dickens. Patient has not completed DPOA or living will paperwork so by [place] law the majority of the siblings would determine patient plan of care. Rukhsana Pedro requests to be primary contact for patient for updates. CC team spoke with Rukhsana Zackeryor and siblings yesterday and changed patient code status to a DNRCC-A. Rukhsana Pedro patient brother states patient was a private person and would not want this. Patient lives with his step son Paul Dickens. 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 Emotional support provided to Rukhsana Pedro and Palliative care will continue to follow. Education/support to family  Discharge planning/helping to coordinate care  Communications with primary service  Pharmacologic pain management  Providing support for coping/adaptation/distress of family  Discussing meaning/purpose   Caregiver support/education  Continue with current plan of care  Code status clarified: DNRCCA      History of Present Illness     The patient is a 78 y.o. Non- / non  male who presents with Cardiac Arrest      Referred to Palliative Care by   [x] Physician   [] Nursing  [] Family Request   [] Other:       He was admitted to the ICU service for Cardiac arrest ([facility] [address] [I46.9].  His hospital course has been associated with Cardiac arrest [facility]). The patient has a complicated medical history and has been hospitalized since 3/28/2022 10:29 Garry Rey is a 78year old male with past medical history of Hypertension, Alcohol abuse, Cirrhosis with ascites. Patient was seen at grocery store pushing a cart and appeared ill. He was found collapsed to ground in prone position and unresponsive. CPR was not initiated until EMS arrived estimated about 40 minutes. Patient underwent 4 rounds of CPR and Epinephrine about 30 minutes prior to Rosc. At hospital patient arrested twice again and regained Rosc and was intubated. Patient remains on ventilator without sedation. He is on a Dopamine drip for pressor support. He is also on Sodium bicarb drip. Patient underwent EGD that showed active retropharyngeal bleeding and ENT consulted. Patient found to have multiple rib and sternal fractures from extensive CPR. Patient found to have Aspiration pneumonia bilateral lower lobes and Unasyn ordered. Possible Colonoscopy being considered for patient due to bleeding. Patient labs from today include hemoglobin 8.3 hematocrit 24.9, lactic acid 3.7, troponin 108, albumin 2.5, alk phos 82, ALT 41, AST 81, total bilirubin 2.08, WBC 15.1, RBC 2.96, hemoglobin 7.1, hematocrit 22.0, platelets 232, ionized calcium 1.05, glucose 98, BUN 18, creatinine 2.08, calcium 7.3, sodium 138, potassium 3.7, chloride 104, ammonia 65. Patient has underwent the following scans during hospitalization CT chest, abdomen xray, CT abdomen and pelvis, CT Cspine, CT head, and Chest xray and results listed below. Patient has the following consults CC post arrest, Palliative care Goals of care, Cardiology cardiac arrest, otolaryngology due to retropharyngeal bleeding, neuro critical care due to prolonged downtime with PEA,  cardiothoracic due to substernal hematoma, GI due to upper GI bleed.   Palliative care will continue to follow patient and reach out to patient's family to provide emotional support and updates as needed. CT chest  1.  Interval decrease in size of substernal hematoma.       2.  Increasing right pleural effusion, now moderate in size.  Blood products   in this area appear extrapleural.  Similar appearance of low-density left   pleural effusion.       3.  Redemonstration of sternal and bilateral rib fractures with overlying   subcutaneous edema.       4.  Cirrhotic liver morphology and ascites. Abdomen x-ray  Enteric tube looped within the body of the stomach with the tip directed   superiorly towards the fundus of the stomach. CT abdomen and pelvis  No acute traumatic injury of the abdomen or pelvis.       Findings of hepatic cirrhosis with abdominal and pelvic ascites possibly   related to the cirrhosis.       Mildly prominent loops of bowel throughout the abdomen and pelvis with   mucosal enhancement.  This may be secondary to mild ileus or shock bowel.  No   evidence of bowel obstruction.       No acute traumatic injury of the aorta.  No active extravasation of contrast.       See separate CT of the chest for discussion of those findings. CT C-spine  No acute traumatic injury of the cervical spine.  Mild multilevel   degenerative changes.       Pleural effusions in the lung apices. CT head  No acute intracranial abnormality    Chest x-ray  1.  Endotracheal tube terminates 2 cm above the matthew.  Enteric tube   terminates at the body of the stomach.       2.  Bilateral airspace disease, which may represent inflammatory process or   multifocal infection. Active Hospital Problems    Diagnosis Date Noted    Cardiac arrest [facility]) [I46.9] 03/28/2022    Acute blood loss anemia [D62]     Hematemesis [T67.1]     Alcoholic cirrhosis of liver with ascites ([facility] [address] [K70.31]        PAST MEDICAL HISTORY      Diagnosis Date    Hypertension        PAST SURGICAL HISTORY  History reviewed. No pertinent surgical history.     SOCIAL HISTORY  Social History     Tobacco Use    Smoking status: Never Smoker    Smokeless tobacco: Never Used   Substance Use Topics    Alcohol use: Yes     Alcohol/week: 6.0 standard drinks     Types: 6 Cans of beer per week     Comment: pt states he drinks daily    Drug use: No       ALLERGIES  No Known Allergies      MEDICATIONS  Current Medications    insulin lispro  0-18 Units SubCUTAneous Q4H    ampicillin-sulbactam  3,000 mg IntraVENous Q6H    pantoprazole (PROTONIX) 40 mg injection  40 mg IntraVENous Q12H    rifaximin  200 mg Per NG tube TID    levetiracetam  1,000 mg IntraVENous Q12H    bisacodyl  20 mg Oral Once     sodium chloride, ondansetron **OR** ondansetron, polyethylene glycol, glucose, dextrose, glucagon (rDNA), dextrose  IV Drips/Infusions   sodium chloride      sodium bicarbonate infusion 50 mL/hr at 03/29/22 0949    dextrose      DOPamine 4 mcg/kg/min (03/29/22 1136)    midazolam 2 mg/hr (03/29/22 0913)    fentaNYL Stopped (03/29/22 0532)     Home Medications  No current facility-administered medications on file prior to encounter.      Current Outpatient Medications on File Prior to Encounter   Medication Sig Dispense Refill    metoprolol succinate (TOPROL XL) 50 MG extended release tablet Take 200 mg by mouth daily          Data         BP (!) 120/55   Pulse 68   Temp 98.1 °F (36.7 °C)   Resp 20   Ht 5' 9\" (1.753 m)   Wt 254 lb 3.1 oz (115.3 kg)   SpO2 96%   BMI 37.54 kg/m²     Wt Readings from Last 3 Encounters:   03/29/22 254 lb 3.1 oz (115.3 kg)   12/02/17 180 lb (81.6 kg)        Code Status: DNR-CCA     ADVANCED CARE PLANNING:  Patient has capacity for medical decisions: no  Health Care Power of : no  Living Will: no     Personal, Social, and Family History  Marital Status:   Living situation: with family:  Step son Luisa Vazquez   Importance of quintin/Jewish/spiritual beliefs: [] Very [] Somewhat [] Not   Psychological Distress: moderate  Does patient understand diagnosis/treatment? no  Does caregiver understand diagnosis/treatment? yes    Past Medical History:   Diagnosis Date    Hypertension          History reviewed. No pertinent family history. Social History     Tobacco Use    Smoking status: Never Smoker    Smokeless tobacco: Never Used   Substance Use Topics    Alcohol use: Yes     Alcohol/week: 6.0 standard drinks     Types: 6 Cans of beer per week     Comment: pt states he drinks daily    Drug use: No           Assessment        REVIEW OF SYSTEMS  Patient on ventilator and non responsive unable to assess ROS presently     PHYSICAL ASSESSMENT:  Constitutional: Patient on ventilator and non responsive does not answer questions or follow commands   Head: Normocephalic and atraumatic. Eyes: Patient eyes are closed   Neck: Normal range of motion. Neck supple. No tracheal deviation present. Cardiovascular: Normal rate and regular rhythm, S1, S2, no murmur. Pulmonary/Chest: Patient on ventilator respirations relaxed   Abdomen: Soft. No tenderness, not distended, no ascites, no organomegaly. Musculoskeletal:generalized weakness   Neurological: Patient on ventilator and non responsive   Skin: Normal turgor, no bleeding, no bruising. Palliative Performance Scale:  ___60%  Ambulation reduced; Significant disease; Can't do hobbies/housework; intake normal or reduced; occasional assist; LOC full/confusion  ___50%  Mainly sit/lie; Extensive disease; Can't do any work; Considerable assist; intake normal or reduced; LOC full/confusion  ___40%  Mainly in bed; Extensive disease; Mainly assist; intake normal or reduced; LOC full/confusion   _x__30%  Bed Bound; Extensive disease; Total care; intake reduced; LOC full/confusion  ___20%  Bed Bound; Extensive disease; Total care; intake minimal; Drowsy/coma  ___10%  Bed Bound; Extensive disease;  Total care; Mouth care only; Drowsy/coma  ___0       Death      Principle Problem/Diagnosis:  Cardiac arrest [facility])    Additional Assessments:   Principal Problem:    Cardiac arrest [facility])  Active Problems:    Acute blood loss anemia    Hematemesis    Alcoholic cirrhosis of liver with ascites (HCC)  Resolved Problems:    * No resolved hospital problems. *    1- Symptom management/ pain control     Pain Assessment:  Fentanyl                Anxiety:  none                          Dyspnea:  patient is on ventilator                           Fatigue:  generalized weakness     Other:      2- Goals of care evaluation   The patient goals of care are improve or maintain function/quality of life   Goals of care discussed with:    [] Patient independently    [] Patient and Family    [x] Family or Healthcare DPOA independently    [] Unable to discuss with patient, family/DPOA not present    3- Code Status  DNR-CCA    4- Other recommendations   - We will continue to provide comfort and support to the patient and the family      Palliative Care will continue to follow Mr. Samaniego Mandeep joy as needed. Thank you for allowing Palliative Care to participate in the care of Mr. Sachin Rolon . This note has been dictated by dragon, typing errors may be a possibility. The total time I spent in seeing the patient, discussing goals of care, advanced directives, code status, greater than 50% time in counseling and other major issues was more than 60 minutes      Electronically signed by   KATIE Hill NP  Palliative Care Team  on 3/29/2022 at 12:04 PM    Palliative care office: [phone]    Please call with any palliative questions or concerns. Palliative Care Team is available via perfect serve or via phone.

## 2022-03-29 NOTE — PROCEDURES
Referring physician: Dr. Ball Neither  Date: 3/29/2022  Start Time: 3/28/2022 @ 1700  End Time: 3/29/2022 @ 1700    Indication  Patient with encephalopathy, EEG done to rule out subclinical seizures. Introduction  This continuous video-EEG was acquired using a X BODY workstation at 256 samples/s. Electrodes were placed according to the International 10-20 system. Automated spike and seizure detection algorithms were applied. Video was recorded during this study. Description  The background consistent of diffuse none reactive polymorphic delta , later became complete suppression intermixed with vent artifact. . No consistent focal slowing or interhemispheric asymmetry was noted. Normal sleep structures were observed. There were no interictal epileptiform discharges or electrographic seizures. Events  3/28-29/2022 @ 1737  Clinically:  patient had whole body jerk. EEG: correlated with burst of delta rhythmic activity X 3 seconds. - Staff reported rhythmic tongue movements - no EEG correlation. Correlate clinically. Impression  Abnormal continuous vEEG recording, the slowing mentioned above suggests severe non specific encephalopathy. The initial event noted consistent with cortical myoclonus. Tongue movement did not correlate with specific EEG pattern, correlate clinically. Pilar Malik MD  Epilepsy Board Certified. Neurology Board Certified.     Electronically Signed

## 2022-03-29 NOTE — PATIENT CARE CONFERENCE
I had a long discussion with the patient's family in person, in presence of the RN taking care of the patient. Patient's current clinical condition, laboratory and radiographic findings as well as recommendations of physicians consulted on the case were discussed with the patient's family in detail in simple [place]. All questions and concerns of the family were addressed, and appropriate emotional support was provided. After understanding patient's current medical condition, family decided that they wanted to continue the ongoing treatment but in case patient's heart stops (cardiac arrest) they do not want any chest compressions or other similar  resuscitative measures to be performed on the patient. They requested that if such a condition arises, the patient should be made comfortable and nature should be allowed to take its course. They asked that patient's code status should be changed to [facility], and signed the [facility] order form in my presence, which was witnessed by RN. To remain intubated. Spoke with Maribel Bonilla (Brother) in person who assisted in contacting other siblings via telephone. Siblings who confirmed DNR-CCA Code Status Change:  Maribel Bonilla [facility]) [phone] In person Primary POC  Zaina Gilmore (Sister) [phone] Spoke on phone  Russell Alcaraz (Sister) [phone] on phone  Shawn Toth (Sister) [phone] Spoke on phone    Other siblings that were not reachable at the time of conversation:  [facility]) [phone] (unknown if still at this number) Unavailable   Logan Alfred (Sister) [phone] Unavailable   Katie Jessica [facility]) [phone] (unknown if still at this number) Unavailable    7 total siblings, no living spouse, adult children, or parents. Consensus among 4 of the 7 siblings, in accordance with [place] law we will honor family's wishes, and will change patient's code status to [facility], to remain intubated.       Nargis Chaudhary, MD,  Department of Emergency Medicine,  [address] [facility]        3/29/2022, 5:54 AM

## 2022-03-29 NOTE — PLAN OF CARE
Problem: OXYGENATION/RESPIRATORY FUNCTION  Goal: Patient will maintain patent airway  3/28/2022 2011 by Constantino Her RN  Outcome: Ongoing  3/28/2022 1219 by Ingrid Concepcion RCP  Outcome: Ongoing  Goal: Patient will achieve/maintain normal respiratory rate/effort  Description: Respiratory rate and effort will be within normal limits for the patient  3/28/2022 2011 by Constantino Her RN  Outcome: Ongoing  3/28/2022 1219 by Ingrid Concepcion RCP  Outcome: Ongoing     Problem: MECHANICAL VENTILATION  Goal: Patient will maintain patent airway  3/28/2022 2011 by Constantino Her RN  Outcome: Ongoing  3/28/2022 1219 by Ingrid Concepcion RCP  Outcome: Ongoing  Goal: Oral health is maintained or improved  3/28/2022 2011 by Constantino Her RN  Outcome: Ongoing  3/28/2022 1219 by Ingrid Concepcion RCP  Outcome: Ongoing  Goal: ET tube will be managed safely  3/28/2022 2011 by Constantino Her RN  Outcome: Ongoing  3/28/2022 1219 by Ingrid Concepcion RCP  Outcome: Ongoing  Goal: Ability to express needs and understand communication  3/28/2022 2011 by Constantino Her RN  Outcome: Ongoing  3/28/2022 1219 by Ingrid Concepcion RCP  Outcome: Ongoing  Goal: Mobility/activity is maintained at optimum level for patient  3/28/2022 2011 by Constantino Her RN  Outcome: Ongoing  3/28/2022 1219 by Ingrid Concepcion RCP  Outcome: Ongoing     Problem: SKIN INTEGRITY  Goal: Skin integrity is maintained or improved  3/28/2022 2011 by Constantino Her RN  Outcome: Ongoing  3/28/2022 1219 by Ingrid Concepcion RCP  Outcome: Ongoing No

## 2022-03-29 NOTE — FLOWSHEET NOTE
SPIRITUAL CARE DEPARTMENT - [address]  PROGRESS NOTE    Shift date: 2022  Shift day: Tuesday   Shift # 2    Room # 5404/1229-88   Name: Ade Melendez            Age: 78 y.o. Gender: male          Presybeterian: Maye Stafford 33 of Restorationist:     Referral: Routine Visit    Admit Date & Time: 3/28/2022 10:29 AM    PATIENT/EVENT DESCRIPTION/ASSESSMENT:  Ade Melendez is a 78 y.o. male who arrived yesterday in ED with cardiac arrest. Pt. Intubated with brother Morgan Meek at bedside, Nellie Boggs and nephew Mely Blankenship at bedside. Sister Kashif Qureshi wants RN chavez to know that she wishes for pt. To be [facility] ASAP. Brother Morgan Meek is undecided. Family also points out that brother Debra does not have MPOA and is NOT speaking on behalf of all siblings.  spend time with family members and [address] [place] [address] who speak from the heart about pt's desire to be with his  son and his  spouse.  offers active listening and emotional support as well as prayer at bedside. SPIRITUAL CARE FOLLOW-UP PLAN:  Chaplains will remain available to offer spiritual and emotional support as needed. Electronically signed by Marcel Gutiérrez on 3/29/2022 at 2:40 PM.  [facility]  [phone]     22 1436   Encounter Summary   Services provided to: Patient and family together   Referral/Consult From: [facility] Children;Family members   Place of [address] [place] [address] Visiting   (2022)   Complexity of Encounter High   Length of Encounter 2 hours   Spiritual Assessment Completed Yes   Grief and Life Adjustment   Type End of life   Assessment Calm; Approachable;Grieving   Intervention Active listening;Explored feelings, thoughts, concerns;Explored coping resources;Nurtured hope;Prayer;Sustaining presence/ Ministry of presence   Outcome Comfort;Expressed gratitude;Coping;Grieving;Receptive;Venting emotion

## 2022-03-29 NOTE — PLAN OF CARE
Problem: OXYGENATION/RESPIRATORY FUNCTION  Goal: Patient will maintain patent airway  3/28/2022 2056 by Jason Galicia RCP  Outcome: Ongoing     Problem: OXYGENATION/RESPIRATORY FUNCTION  Goal: Patient will achieve/maintain normal respiratory rate/effort  Description: Respiratory rate and effort will be within normal limits for the patient  3/28/2022 2056 by Jason Galicia RCP  Outcome: Ongoing     Problem: MECHANICAL VENTILATION  Goal: Patient will maintain patent airway  3/28/2022 2056 by Jason Galicia RCP  Outcome: Ongoing     Problem: MECHANICAL VENTILATION  Goal: Oral health is maintained or improved  3/28/2022 2056 by Jason Galicia RCP  Outcome: Ongoing     Problem: MECHANICAL VENTILATION  Goal: ET tube will be managed safely  3/28/2022 2056 by Jason Galicia RCP  Outcome: Ongoing     Problem: MECHANICAL VENTILATION  Goal: Ability to express needs and understand communication  3/28/2022 2056 by Jason Galicia RCP  Outcome: Ongoing     Problem: MECHANICAL VENTILATION  Goal: Mobility/activity is maintained at optimum level for patient  3/28/2022 2056 by Jason Galicia RCP  Outcome: Ongoing     Problem: SKIN INTEGRITY  Goal: Skin integrity is maintained or improved  3/28/2022 2056 by Jason Galicia RCP  Outcome: Ongoing

## 2022-03-29 NOTE — PLAN OF CARE
Problem: MECHANICAL VENTILATION  Goal: Patient will maintain patent airway  3/29/2022 1435 by Richard Prado RN  Outcome: Ongoing     Problem: MECHANICAL VENTILATION  Goal: Oral health is maintained or improved  3/29/2022 1435 by Richard Prado RN  Outcome: Ongoing     Problem: MECHANICAL VENTILATION  Goal: ET tube will be managed safely  3/29/2022 1435 by Richard Prado RN  Outcome: Ongoing     Problem: SKIN INTEGRITY  Goal: Skin integrity is maintained or improved  3/29/2022 1435 by Richard Prado RN  Outcome: Ongoing     Problem: Falls - Risk of:  Goal: Will remain free from falls  Description: Will remain free from falls  Outcome: Ongoing     Problem: Falls - Risk of:  Goal: Absence of physical injury  Description: Absence of physical injury  Outcome: Ongoing     Problem: Skin Integrity:  Goal: Will show no infection signs and symptoms  Description: Will show no infection signs and symptoms  Outcome: Ongoing     Problem: Skin Integrity:  Goal: Absence of new skin breakdown  Description: Absence of new skin breakdown  Outcome: Ongoing     Problem: Injury - Risk of, Physical Injury:  Goal: Will remain free from falls  Description: Will remain free from falls  Outcome: Ongoing     Problem: Injury - Risk of, Physical Injury:  Goal: Absence of physical injury  Description: Absence of physical injury  Outcome: Ongoing     Problem: Nutrition  Goal: Optimal nutrition therapy  3/29/2022 1435 by Richard Prado RN  Outcome: Ongoing

## 2022-03-29 NOTE — PLAN OF CARE
Problem: OXYGENATION/RESPIRATORY FUNCTION  Goal: Patient will maintain patent airway  3/29/2022 0742 by Sg Newsome RCP  Outcome: Ongoing     Problem: OXYGENATION/RESPIRATORY FUNCTION  Goal: Patient will achieve/maintain normal respiratory rate/effort  Description: Respiratory rate and effort will be within normal limits for the patient  3/29/2022 6776 by Sg Newsome RCP  Outcome: Ongoing     Problem: MECHANICAL VENTILATION  Goal: Patient will maintain patent airway  3/29/2022 0742 by Sg Newsome RCP  Outcome: Ongoing     Problem: MECHANICAL VENTILATION  Goal: Oral health is maintained or improved  3/29/2022 0742 by Sg Newsome RCP  Outcome: Ongoing     Problem: MECHANICAL VENTILATION  Goal: ET tube will be managed safely  3/29/2022 0742 by Sg Newsome RCP  Outcome: Ongoing     Problem: MECHANICAL VENTILATION  Goal: Ability to express needs and understand communication  3/29/2022 0742 by Sg Newsome RCP  Outcome: Ongoing     Problem: MECHANICAL VENTILATION  Goal: Mobility/activity is maintained at optimum level for patient  3/29/2022 0798 by Sg Newsome RCP  Outcome: Ongoing     Problem: SKIN INTEGRITY  Goal: Skin integrity is maintained or improved  3/29/2022 0742 by Sg Newsome RCP  Outcome: Ongoing

## 2022-03-30 NOTE — PROGRESS NOTES
Attempted to transport patient with RT Oralia Garcia - each time patient switched onto transport vent patient having increased work of breathing, Increased peak pressures greater then 50, and also tachypnea into 40-50s - 50 of fentanyl given with little improvement then 2 mg of versed also with minimal improvement - patient attempted to be placed on vent multiple times with each time resulting in patient becoming non-compliant with vent - critical care aware - IR able to come to bedside for paracentesis however unable to transport patient to MRI at this time without further plan for safe transport.

## 2022-03-30 NOTE — PLAN OF CARE
Problem: OXYGENATION/RESPIRATORY FUNCTION  Goal: Patient will maintain patent airway  3/29/2022 2055 by Colby Tolbert RN  Outcome: Ongoing  3/29/2022 0742 by Herve Stoll RCP  Outcome: Ongoing  Goal: Patient will achieve/maintain normal respiratory rate/effort  Description: Respiratory rate and effort will be within normal limits for the patient  3/29/2022 2055 by Colby Tolbert RN  Outcome: Ongoing  3/29/2022 0742 by Herve Stoll RCP  Outcome: Ongoing     Problem: MECHANICAL VENTILATION  Goal: Patient will maintain patent airway  3/29/2022 2055 by Colby Tolbert RN  Outcome: Ongoing  3/29/2022 1435 by Rayne Rodas RN  Outcome: Ongoing  3/29/2022 0742 by Herve Stoll RCP  Outcome: Ongoing  Goal: Oral health is maintained or improved  3/29/2022 2055 by Colby Tolbert RN  Outcome: Ongoing  3/29/2022 1435 by Rayne Rodas RN  Outcome: Ongoing  3/29/2022 0742 by Herve Stoll RCP  Outcome: Ongoing  Goal: ET tube will be managed safely  3/29/2022 2055 by Colby Tolbert RN  Outcome: Ongoing  3/29/2022 1435 by Rayne Rodas RN  Outcome: Ongoing  3/29/2022 0742 by Herve Stoll RCP  Outcome: Ongoing  Goal: Ability to express needs and understand communication  3/29/2022 2055 by Colby Tolbert RN  Outcome: Ongoing  3/29/2022 0742 by Herve Stoll RCP  Outcome: Ongoing  Goal: Mobility/activity is maintained at optimum level for patient  3/29/2022 2055 by Colby Tolbert RN  Outcome: Ongoing  3/29/2022 0742 by Herve Stoll RCP  Outcome: Ongoing     Problem: SKIN INTEGRITY  Goal: Skin integrity is maintained or improved  3/29/2022 2055 by Colby Tolbert RN  Outcome: Ongoing  3/29/2022 1435 by Rayne Rodas RN  Outcome: Ongoing  3/29/2022 0742 by Herve Stoll RCP  Outcome: Ongoing     Problem: Falls - Risk of:  Goal: Will remain free from falls  Description: Will remain free from falls  3/29/2022 2055 by Colby Tolbert RN  Outcome: Ongoing  3/29/2022 1435 by Rayne Rodas RN  Outcome: Ongoing  Goal: Absence of physical injury  Description: Absence of physical injury  3/29/2022 2055 by Concha Garrett RN  Outcome: Ongoing  3/29/2022 1435 by Alvin Pickering RN  Outcome: Ongoing     Problem: Skin Integrity:  Goal: Will show no infection signs and symptoms  Description: Will show no infection signs and symptoms  3/29/2022 2055 by Concha Garrett RN  Outcome: Ongoing  3/29/2022 1435 by Alvin Pickering RN  Outcome: Ongoing  Goal: Absence of new skin breakdown  Description: Absence of new skin breakdown  3/29/2022 2055 by Concha Garrett RN  Outcome: Ongoing  3/29/2022 1435 by Alvin Pickering RN  Outcome: Ongoing     Problem: Confusion - Acute:  Goal: Absence of continued neurological deterioration signs and symptoms  Description: Absence of continued neurological deterioration signs and symptoms  Outcome: Ongoing  Goal: Mental status will be restored to baseline  Description: Mental status will be restored to baseline  Outcome: Ongoing     Problem: Discharge Planning:  Goal: Ability to perform activities of daily living will improve  Description: Ability to perform activities of daily living will improve  Outcome: Ongoing  Goal: Participates in care planning  Description: Participates in care planning  Outcome: Ongoing     Problem: Injury - Risk of, Physical Injury:  Goal: Will remain free from falls  Description: Will remain free from falls  3/29/2022 2055 by Concha Garrett RN  Outcome: Ongoing  3/29/2022 1435 by Alvin Pickering RN  Outcome: Ongoing  Goal: Absence of physical injury  Description: Absence of physical injury  3/29/2022 2055 by Concha Garrett RN  Outcome: Ongoing  3/29/2022 1435 by Alvin Pickering RN  Outcome: Ongoing     Problem: Mood - Altered:  Goal: Mood stable  Description: Mood stable  Outcome: Ongoing  Goal: Absence of abusive behavior  Description: Absence of abusive behavior  Outcome: Ongoing  Goal: Verbalizations of feeling emotionally comfortable while being cared for will increase  Description: Verbalizations of feeling emotionally comfortable while being cared for will increase  Outcome: Ongoing     Problem: Psychomotor Activity - Altered:  Goal: Absence of psychomotor disturbance signs and symptoms  Description: Absence of psychomotor disturbance signs and symptoms  Outcome: Ongoing     Problem: Sensory Perception - Impaired:  Goal: Demonstrations of improved sensory functioning will increase  Description: Demonstrations of improved sensory functioning will increase  Outcome: Ongoing  Goal: Decrease in sensory misperception frequency  Description: Decrease in sensory misperception frequency  Outcome: Ongoing  Goal: Able to refrain from responding to false sensory perceptions  Description: Able to refrain from responding to false sensory perceptions  Outcome: Ongoing  Goal: Demonstrates accurate environmental perceptions  Description: Demonstrates accurate environmental perceptions  Outcome: Ongoing  Goal: Able to distinguish between reality-based and nonreality-based thinking  Description: Able to distinguish between reality-based and nonreality-based thinking  Outcome: Ongoing  Goal: Able to interrupt nonreality-based thinking  Description: Able to interrupt nonreality-based thinking  Outcome: Ongoing     Problem: Sleep Pattern Disturbance:  Goal: Appears well-rested  Description: Appears well-rested  Outcome: Ongoing     Problem: Nutrition  Goal: Optimal nutrition therapy  3/29/2022 2055 by Lolly Lorenzana RN  Outcome: Ongoing  3/29/2022 1435 by Deon Ricketst RN  Outcome: Ongoing  3/29/2022 1126 by Lauren Kong, MS, RD, LD  Outcome: Ongoing

## 2022-03-30 NOTE — PLAN OF CARE
Problem: OXYGENATION/RESPIRATORY FUNCTION  Goal: Patient will maintain patent airway  3/29/2022 2159 by Brittany Mckenzie RCP  Outcome: Ongoing     Problem: OXYGENATION/RESPIRATORY FUNCTION  Goal: Patient will achieve/maintain normal respiratory rate/effort  Description: Respiratory rate and effort will be within normal limits for the patient  3/29/2022 2159 by Brittany Mckenzie RCP  Outcome: Ongoing     Problem: MECHANICAL VENTILATION  Goal: Patient will maintain patent airway  3/29/2022 2159 by Brittany Mckenzie RCP  Outcome: Ongoing     Problem: MECHANICAL VENTILATION  Goal: Oral health is maintained or improved  3/29/2022 2159 by Brittany Mckenzie RCP  Outcome: Ongoing     Problem: MECHANICAL VENTILATION  Goal: ET tube will be managed safely  3/29/2022 2159 by Brittany Mckenzie RCP  Outcome: Ongoing     Problem: MECHANICAL VENTILATION  Goal: Ability to express needs and understand communication  3/29/2022 2159 by Brittany Mckenzie RCP  Outcome: Ongoing     Problem: MECHANICAL VENTILATION  Goal: Mobility/activity is maintained at optimum level for patient  3/29/2022 2159 by Brittany Mckenzie RCP  Outcome: Ongoing     Problem: SKIN INTEGRITY  Goal: Skin integrity is maintained or improved  3/29/2022 2159 by Brittany Mckenzie RCP  Outcome: Ongoing

## 2022-03-30 NOTE — PLAN OF CARE
Problem: OXYGENATION/RESPIRATORY FUNCTION  Goal: Patient will maintain patent airway  3/30/2022 0839 by Mendel Mazariegos, RCP  Outcome: Ongoing  3/29/2022 2159 by Thomas Alejandro, RCP  Outcome: Ongoing  3/29/2022 2055 by Caron Kc RN  Outcome: Ongoing  Goal: Patient will achieve/maintain normal respiratory rate/effort  Description: Respiratory rate and effort will be within normal limits for the patient  3/30/2022 9272 by Mednel Mazariegos, RCP  Outcome: Ongoing  3/29/2022 2159 by Thomas Alejandro, RCP  Outcome: Ongoing  3/29/2022 2055 by Caron Kc RN  Outcome: Ongoing     Problem: MECHANICAL VENTILATION  Goal: Patient will maintain patent airway  3/30/2022 0839 by Mendel Mazariegos, RCP  Outcome: Ongoing  3/29/2022 2159 by Thomas Alejandro, RCP  Outcome: Ongoing  3/29/2022 2055 by Caron Kc RN  Outcome: Ongoing  Goal: Oral health is maintained or improved  3/30/2022 0839 by Mendel Mazariegos, RCP  Outcome: Ongoing  3/29/2022 2159 by Thomas Alejandro, RCP  Outcome: Ongoing  3/29/2022 2055 by Caron Kc RN  Outcome: Ongoing  Goal: ET tube will be managed safely  3/30/2022 0839 by Mendel Mazariegos, RCP  Outcome: Ongoing  3/29/2022 2159 by Thomas Alejandro, RCP  Outcome: Ongoing  3/29/2022 2055 by Caron Kc RN  Outcome: Ongoing  Goal: Ability to express needs and understand communication  3/30/2022 0839 by Mendel Mazariegos, RCP  Outcome: Ongoing  3/29/2022 2159 by Thomas Alejandro, RCP  Outcome: Ongoing  3/29/2022 2055 by Caron Kc RN  Outcome: Ongoing  Goal: Mobility/activity is maintained at optimum level for patient  3/30/2022 3437 by Mendel Mazariegos, RCP  Outcome: Ongoing  3/29/2022 2159 by Thomas Alejandro, RCP  Outcome: Ongoing  3/29/2022 2055 by Caron Kc RN  Outcome: Ongoing

## 2022-03-30 NOTE — PROGRESS NOTES
Spoke w/ critical care resident and Neuro crit care NP regarding plan for MRI and need for additional medication for patient to tolerate the procedure - Critical care resident said he will speak w/ Dr. Danell Dakins to determine plan

## 2022-03-30 NOTE — PROGRESS NOTES
Daily Progress Note  Neuro Critical Care    Patient Name: Trinidad Cornell  Patient : 1955  Room/Bed: 2516/5723-16  Code Status: DNR-CCA  Allergies: No Known Allergies    CHIEF COMPLAINT:      Post Cardiac arrest     INTERVAL HISTORY    Initial Presentation (Admitted 3/28/22): The patient is a 78 y.o. male admitted on 3/28 after cardiac arrest with prolonged downtime. According to the medical record, the patient was walking to the grocery store, which he does every day, when he collapsed to the ground and was in cardiac arrest.  No bystander CPR until EMS arrival.  In the field, the patient underwent CPR with PEA throughout, epinephrine x4. Igel was placed by EMS and patient arrived to ER with ROSC, however was bradycardic and initiated on epinephrine. Approximately 40 minutes of downtime. Shortly after arrival in the emergency department, the patient again lost pulses, underwent 1 round of CPR, epi x1, ROSC. Was intubated, coded again, CPR x1 round, bicarb x1, continuous epinephrine infusion.      While in the emergency department, the patient was found to have a mixed metabolic acidosis with pH 6.7 and CO2 of 95, hemoglobin 5.6. Status post 1 unit transfusion PRBC. CXR showed bilateral airspace disease that may represent inflammatory process or multifocal infection. CT head was unremarkable. Ct cervical showed pleural effusions in the apices of the lungs. CTA chest showed traumatic nondisplaced rib fractures and a traumatic nondisplaced fracture of the body of the sternum. A substernal hematoma with small focus of active extravasation of contrast suggesting arterial bleed and multiple hematomas in the subpleural space with active extravasation suggesting bleeding from the right internal thoracic artery. Ground glass opacities suggesting pulmonary contusions with upper abdominal ascites.      He was admitted to the medical ICU for post cardiac arrest management.  Myoclonic jerking noted, which terminated after 4mg Versed.     His medical history significant for hypertension and alcohol use disorder.     The patient is 1 of 7 siblings, family requesting neurological prognostication. Hospital Course:   3/29: Concern for seizures with tongue twitching, given Versed 10 mg x 1 with no change, did not correlate on LTME. Versed infusion discontinued. Last 24h:   No acute events overnight. LTME with no seizures, will discontinue today. Plan to push up MRI and complete today. Will plan for family meeting tomorrow to review MRI. Continue Keppra 1g BID.      CURRENT MEDICATIONS:  SCHEDULED MEDICATIONS:   insulin lispro  0-18 Units SubCUTAneous Q4H    ampicillin-sulbactam  3,000 mg IntraVENous Q6H    pantoprazole (PROTONIX) 40 mg injection  40 mg IntraVENous Q12H    rifaximin  200 mg Per NG tube TID    levetiracetam  1,000 mg IntraVENous Q12H    bisacodyl  20 mg Oral Once     CONTINUOUS INFUSIONS:   sodium chloride      sodium chloride      sodium bicarbonate infusion 50 mL/hr at 22 07    dextrose      DOPamine 1 mcg/kg/min (22 07)    fentaNYL Stopped (22 0532)     PRN MEDICATIONS:   sodium chloride, sodium chloride, ondansetron **OR** ondansetron, polyethylene glycol, glucose, dextrose, glucagon (rDNA), dextrose    VITALS:  Temperature Range: Temp: 95 °F (35 °C) Temp  Av.5 °F (36.4 °C)  Min: 95 °F (35 °C)  Max: 98.4 °F (36.9 °C)  BP Range: Systolic (49DIK), LSN:969 , Min:120 , RKC:358     Diastolic (00BCQ), VXH:16, Min:52, Max:60    Pulse Range: Pulse  Av.6  Min: 59  Max: 77  Respiration Range: Resp  Av.7  Min: 0  Max: 30  Current Pulse Ox: SpO2: 98 %  24HR Pulse Ox Range: SpO2  Av.8 %  Min: 91 %  Max: 100 %  Patient Vitals for the past 12 hrs:   BP Temp Temp src Pulse Resp SpO2 Weight   22 0740    77 22 98 %    22 0715    70 26 100 %    22 0700    69 (!) 0 100 %    22 0645    71 (!) 0 100 %    2230    68 (!) 0 100 %    03/30/22 0615    65 (!) 0 100 %    03/30/22 0600    66 (!) 0 100 %    03/30/22 0545    67 (!) 0 100 %    03/30/22 0530    64 (!) 0 100 %    03/30/22 0515    64 (!) 0 100 %    03/30/22 0500    65 (!) 0 99 %    03/30/22 0445    65 (!) 0 98 %    03/30/22 0430    71 (!) 0 98 %    03/30/22 0415    71 (!) 0 99 %    03/30/22 0408       260 lb 9.3 oz (118.2 kg)   03/30/22 0405    69 (!) 0 100 %    03/30/22 0400 (!) 138/56 95 °F (35 °C) Esophageal 70 11 99 %    03/30/22 0315    64 27 99 %    03/30/22 0300    64 22 99 %    03/30/22 0245    67 26 100 %    03/30/22 0230    67 17 100 %    03/30/22 0215    62 24 98 %    03/30/22 0200    72 20 99 %    03/30/22 0145    73 20 100 %    03/30/22 0130    70 20 100 %    03/30/22 0115    66 20 100 %    03/30/22 0100    68 20 100 %    03/30/22 0045    66 20 100 %    03/30/22 0030    66 20 100 %    03/30/22 0015    65 20 100 %    03/30/22 0000 (!) 150/60 95.9 °F (35.5 °C) Esophageal 67 20 100 %    03/29/22 2350    59 20 100 %    03/29/22 2345    67 20 100 %    03/29/22 2330    67 20 100 %    03/29/22 2315    65 20 100 %    03/29/22 2300    65 20 100 %    03/29/22 2245    65 20 100 %    03/29/22 2230    65 20 100 %    03/29/22 2215    68 10 93 %    03/29/22 2200    66 20 98 %    03/29/22 2145    64 20 98 %    03/29/22 2130    66 20 99 %    03/29/22 2115    66 20 99 %    03/29/22 2100    66 20 99 %    03/29/22 2045    66 20 99 %    03/29/22 2030    68 20 99 %    03/29/22 2015    70 20 100 %    03/29/22 2000 (!) 146/54 97.5 °F (36.4 °C) Esophageal 72 21 100 %      Estimated body mass index is 38.48 kg/m² as calculated from the following:    Height as of this encounter: 5' 9\" (1.753 m).     Weight as of this encounter: 260 lb 9.3 oz (118.2 kg).  []<16 Severe malnutrition  []1616.99 Moderate malnutrition  []1718.49 Mild malnutrition  []18.524.9 Normal  []2529.9 Overweight (not obese)  []3034.9 Obese class 1 (Low Risk)  [x]3539.9 Obese class 2 (Moderate Risk)  []?40 Obese class 3 (High Risk)    RECENT LABS:   Lab Results   Component Value Date    WBC 9.9 03/30/2022    HGB 6.9 (LL) 03/30/2022    HCT 20.4 (L) 03/30/2022    PLT 92 (L) 03/30/2022    ALT 37 03/30/2022    AST 85 (H) 03/30/2022     03/30/2022    K 3.1 (L) 03/30/2022     03/30/2022    CREATININE 3.23 (H) 03/30/2022    BUN 29 (H) 03/30/2022    CO2 24 03/30/2022    INR 2.0 03/28/2022     24 HOUR INTAKE/OUTPUT:    Intake/Output Summary (Last 24 hours) at 3/30/2022 0756  Last data filed at 3/30/2022 0720  Gross per 24 hour   Intake 4716.51 ml   Output 66 ml   Net 4650.51 ml       IMAGING:   CT ABDOMEN PELVIS WO CONTRAST Additional Contrast? None   Preliminary Result   1. Cirrhosis, ascites and anasarca. 2. No bowel obstruction identified. 3. Mild distention of the colon which could indicate an ileus. 4. Worsened bilateral pleural effusions and a worsened mild-to-moderate   pericardial effusion. CT CHEST WO CONTRAST   Final Result   1. Interval decrease in size of substernal hematoma. 2.  Increasing right pleural effusion, now moderate in size. Blood products   in this area appear extrapleural.  Similar appearance of low-density left   pleural effusion. 3.  Redemonstration of sternal and bilateral rib fractures with overlying   subcutaneous edema. 4.  Cirrhotic liver morphology and ascites. XR ABDOMEN FOR NG/OG/NE TUBE PLACEMENT   Final Result   Enteric tube looped within the body of the stomach with the tip directed   superiorly towards the fundus of the stomach. XR ABDOMEN FOR NG/OG/NE TUBE PLACEMENT   Final Result   No change in position of the tip of endotracheal tube. If this has been   advanced., Question it may be coiled off the field of view of the   radiograph. .  If warranted, consider chest x-ray or soft tissue neck imaging. Otherwise, consider advancement 10-15 cm. The findings were sent to the Radiology Results [address] at 1:09   am on 3/29/2022to be communicated to a licensed caregiver. XR ABDOMEN FOR NG/OG/NE TUBE PLACEMENT   Final Result   Enteric tube terminating proximal to the GE junction. This should be   advanced approximately 10 cm, and reimaged. XR ABDOMEN FOR NG/OG/NE TUBE PLACEMENT   Final Result   Enteric tube terminates the level of the GE junction. This needs advanced   least 10 cm. The findings were sent to the Radiology Results [address] at 8:49   pm on 3/28/2022to be communicated to a licensed caregiver. CTA CHEST W WO CONTRAST   Final Result   Acute traumatic nondisplaced to minimally displaced fractures of the right   anterior 3rd, 4th, 7th and 8th ribs, left anterior 2nd, 3rd, 4th, 5th, 6th   and 7th ribs as well as the right anterior 4th costochondral cartilage. Acute traumatic nondisplaced fracture of the body of the sternum. Substernal hematoma with small focus of active extravasation of contrast   suggesting arterial bleeding. Hematomas in the subpleural space deep to the right costochondral cartilage   fractures and anterior rib fractures with foci of active extravasation of   contrast suggesting active bleeding from the right internal thoracic artery. Bilateral pleural effusions. Right perihilar upper lobe and lower lobe ground-glass opacities suggesting   pulmonary contusions. No pneumothorax. No acute traumatic injury of the thoracic aorta. Upper abdominal ascites. CTA ABDOMEN PELVIS W CONTRAST   Preliminary Result   No acute traumatic injury of the abdomen or pelvis. Findings of hepatic cirrhosis with abdominal and pelvic ascites possibly   related to the cirrhosis. Mildly prominent loops of bowel throughout the abdomen and pelvis with   mucosal enhancement.   This may be secondary to mild ileus or shock bowel. No   evidence of bowel obstruction. No acute traumatic injury of the aorta. No active extravasation of contrast.      See separate CT of the chest for discussion of those findings. CT Head WO Contrast   Final Result   No acute intracranial abnormality. CT Cervical Spine WO Contrast   Final Result   No acute traumatic injury of the cervical spine. Mild multilevel   degenerative changes. Pleural effusions in the lung apices. XR CHEST PORTABLE   Final Result   1. Endotracheal tube terminates 2 cm above the matthew. Enteric tube   terminates at the body of the stomach. 2.  Bilateral airspace disease, which may represent inflammatory process or   multifocal infection. Labs and Images reviewed with:  [x] Dr. Dominick Echols. Tino    [] Dr. Tana Favre  [] Dr. Monk Score  [] There are no new interval images to review. PHYSICAL EXAM       CONSTITUTIONAL:  Intubated, off sedation. Opens left eye briefly to deep noxious stimulation. Dysconjugate gaze. Does not track or follow commands. No spontaneous movement. HEAD:  normocephalic   EYES:  Pupils 1mm, brisk bilaterally   ENT:  moist mucous membranes   NECK:  supple, symmetric   LUNGS:  Equal air entry bilaterally   CARDIOVASCULAR:  normal s1 / s2, RRR, distal pulses intact   ABDOMEN:  Soft, no rigidity   NEUROLOGIC:  Mental Status: Intubated, off sedation. Does not open eyes or follow commands             Cranial Nerves:    III: Pupils:  equal, round, reactive to light  V: Corneal reflex:  completed. abnormal absent bilaterally  Weak cough    Motor Exam:    Extensor posturing noted to right upper extremity. Withdraw noted to left upper extremity. No movement to bilateral lower extremities. DRAINS:  [x] There are no drains for Neuro Critical Care to monitor at this time.      ASSESSMENT AND PLAN:       This is a 78 y.o. male admitted on 3/28 after cardiac arrest with downtime of approximately 40 minutes. No bystander CPR until EMS arrived, approximately 10 minutes after resting. Patient with 40 minutes of PEA arrest, followed by PEA arrest x2 with eventual ROSC in the emergency department with continuous epinephrine drip, bicarbonate and 1 unit PRBC transfused due to hemoglobin of 5.6. Concern for possible GI bleed as etiology of anemia and subsequent cardiac arrest.  Given his prolonged downtime and poor neurological examination, requesting neuro prognostication. Concern for anoxic brain injury, given prolonged downtime. Also concern for subclinical status epilepticus, given myoclonic jerking prior to my evaluation.     PLAN/MEDICAL DECISION MAKING:     - LTME with no seizures, attenuated  - Loaded with 4g Keppra x1; 1g BID maintenance dose  - Neuron-specific enolase ordered. - Plan to obtain MRI brain today  - Recommend palliative care consult for family support. - Remainder of management per primary.     We will continue to follow along.      For any changes in exam or patient status please contact Neuro Critical Care.             KATIE Souza - CNP  Neuro Critical Care  Pager [phone]  3/30/2022     7:56 AM

## 2022-03-30 NOTE — CONSULTS
Renal Consult Note    Patient :  El Arango; 78 y.o. MRN# [id number]  Location:  6080/3084-01  Attending:  Analisa Hurt MD  Admit Date:  3/28/2022   Hospital Day: 2    Reason for Consult:     Asked by Dr Analisa Hurt MD to see for ROMAN/Elevated Creatinine. History Obtained From:     Patient family brother, electronic medical record    History of Present Illness:     El Arango; 78 y.o. male with past medical history of alcoholic hepatitis, hypertension, dementia, polyneuropathy cerebral atrophy unspecified admitted to the hospital after patient was found down outside  for almost 30 minutes later found to be having cardiac arrest with ROSC achieved after 30 minutes of CPR in route to the hospital.  He subsequently went into 2 other episodes of cardiac arrest with ROSC achieved. Patient was in admitted to ICU. Initial work-up showed patient has severe metabolic acidosis with pH of 6.7 and PCO2 of 95. He was intubated and work up also showed him to be anemic with hemoglobin of 5.6 with bright red blood per rectum and chest x-ray showing bilateral airspace disease concerning for aspiration pneumonia CT head was unremarkable, CT cervical showed pleural effusions and abscess of the lungs and CTA chest showed traumatic nondisplaced rib fractures and traumatic nondisplaced fracture of body of sternum. CT chest without contrast on 3/29/2022 showed increasing right pleural effusion. Also found to have upper abdominal ascites. Patient was started on epinephrine drip post ROSC and was also started on IV pantoprazole and octreotide for suspected upper GI bleed. Nephrology has been consulted for ROMAN with creatinine of 1.12 on admission peaked to 3.23 and is evolving baseline creatinine unavailable but seems to be around 0.9 from Care Everywhere.   As per chart review he was never seen by any nephrologist    Labs showed potassium of 3.1, sodium of 142, calcium of 7.0 with pH of 7.53/PCO2 of 26.8, proBNP of 1512, albumin of 2.1 with elevated bilirubin at 1.53. Continues to be anemic hemoglobin at 6.9 s/p EGD by GI with no signs of overt upper GI bleed  Urinary studies showing trace leukocyte esterase, 2+ proteinuria, normal serology work-up in the past no ultrasound of the kidneys  Urine culture 3/28/22 showing Enterococcus faecalis    Patient has a Welch in place is currently anuric has made 40 mL in the last 24 hours with a net positive of 7.9 L since admission. Started on Lasix drip 20 mg/hr indicated and is currently continued on 0.9 normal saline at 50 mill per hour and is also on dopamine and sodium bicarbonate 150 mEq in D5 at 50 mL since admission which was discontinued today for alkalosis. Past History/Allergies? Social History:     Past Medical History:   Diagnosis Date    Hypertension        Past Surgical History:   Procedure Laterality Date    UPPER GASTROINTESTINAL ENDOSCOPY N/A 3/28/2022    ** BED SIDE** EGD ESOPHAGOGASTRODUODENOSCOPY performed by Nilsa Cifuentes MD at [facility]       No Known Allergies    Social History     Socioeconomic History    Marital status:      Spouse name: Not on file    Number of children: Not on file    Years of education: Not on file    Highest education level: Not on file   Occupational History    Not on file   Tobacco Use    Smoking status: Never Smoker    Smokeless tobacco: Never Used   Substance and Sexual Activity    Alcohol use:  Yes     Alcohol/week: 6.0 standard drinks     Types: 6 Cans of beer per week     Comment: pt states he drinks daily    Drug use: No    Sexual activity: Not on file   Other Topics Concern    Not on file   Social History Narrative    Not on file     Social Determinants of Health     Financial Resource Strain:     Difficulty of Paying Living Expenses: Not on file   Food Insecurity:     Worried About Running Out of Food in the Last Year: Not on file    Haven of Food in the Last Year: Not on CHRIS Herrera Needs:     Lack of Transportation (Medical): Not on file    Lack of Transportation (Non-Medical): Not on file   Physical Activity:     Days of Exercise per Week: Not on file    Minutes of Exercise per Session: Not on file   Stress:     Feeling of Stress : Not on file   Social Connections:     Frequency of Communication with Friends and Family: Not on file    Frequency of Social Gatherings with Friends and Family: Not on file    Attends Yazidism Services: Not on file    Active Member of [address] [place] [address] or Organizations: Not on file    Attends Club or Organization Meetings: Not on file    Marital Status: Not on file   Intimate Partner Violence:     Fear of Current or Ex-Partner: Not on file    Emotionally Abused: Not on file    Physically Abused: Not on file    Sexually Abused: Not on file   Housing Stability:     Unable to Pay for Housing in the Last Year: Not on file    Number of [place] in the Last Year: Not on file    Unstable Housing in the Last Year: Not on file       Family History:      History reviewed. No pertinent family history.     Outpatient Medications:     Medications Prior to Admission: metoprolol succinate (TOPROL XL) 50 MG extended release tablet, Take 200 mg by mouth daily     Current Medications:     Scheduled Meds:    ampicillin-sulbactam  3,000 mg IntraVENous Q12H    potassium chloride  20 mEq IntraVENous Q2H    insulin lispro  0-18 Units SubCUTAneous Q4H    pantoprazole (PROTONIX) 40 mg injection  40 mg IntraVENous Q12H    rifaximin  200 mg Per NG tube TID    levetiracetam  1,000 mg IntraVENous Q12H     Continuous Infusions:    sodium chloride      furosemide (LASIX) 1mg/ml infusion      sodium chloride 50 mL/hr at 03/30/22 1102    sodium chloride      dextrose      DOPamine 1 mcg/kg/min (03/30/22 0720)    fentaNYL Stopped (03/29/22 0532)     PRN Meds:  sodium chloride, sodium chloride, ondansetron **OR** ondansetron, polyethylene glycol, glucose, dextrose, glucagon (rDNA), dextrose    Review of Systems:     Review of systems not possible as patient is on vent     Input/Output:       I/O last 3 completed shifts: In: 7627.9 [I.V.:3485.9; Blood:630; NG/GT:2643; IV Piggyback:869]  Out: 208 [Urine:208]    Vital Signs:   Temperature:  Temp: 97.9 °F (36.6 °C)  TMax:   Temp (24hrs), Av.3 °F (36.3 °C), Min:95 °F (35 °C), Max:98.4 °F (36.9 °C)    Respirations:  Resp: 20  Pulse:   Pulse: 84  BP:    BP: (!) 120/52  BP Range: Systolic (69LTL), ECV:726 , Min:120 , DMY:227       Diastolic (33WMJ), TQA:84, Min:52, Max:60      Physical Examination:     General:  on ventilator. FiO2 40 % with a PEEP of 8, response to pain only  HEENT:  Atraumatic and normocephalic  Eyes:   Pupils 2 mm, brisk bilaterally, patient  Neck:   No thyromegaly, no lymphadenopathy. Chest:   Ventilatory breath sounds  Cardiac: S1 S2 RRR   Abdomen:  Distended abdomen, soft, non-tender, no masses or organomegally appreciable, BS sluggish. :   No suprapubic or flank tenderness. Neuro:  Sedated on ventilator. Skin:   No rashes, good skin turgor. Extremities:  No edema      Labs:       Recent Labs     22  1042 22  1955 22  0500 22  1045 22  1731 22  2324 22  0536   WBC 12.8*  --  15.1*  --   --   --  9.9   RBC 2.61*  --  2.96*  --   --   --  2.73*   HGB 5.6*   < > 7.1*   < > 7.8* 7.3* 6.9*   HCT 20.0*   < > 22.0*   < > 23.3* 22.2* 20.4*   MCV 76.6*  --  74.3*  --   --   --  74.7*   MCH 21.5*  --  24.0*  --   --   --  25.3   MCHC 28.0*  --  32.3  --   --   --  33.8   RDW 17.9*  --  18.6*  --   --   --  19.9*     --  157  --   --   --  92*   MPV 11.2  --  10.7  --   --   --  10.3    < > = values in this interval not displayed.       BMP:   Recent Labs     22  0500 22  1731 22  0536    131* 142   K 3.7 3.6* 3.1*    99 105   CO2 18* 22 24   BUN 18 23 29*   CREATININE 2.08* 2.72* 3.23*   GLUCOSE 98 129* 151*   CALCIUM 7.3* 7.2* 7.0* Magnesium:    Recent Labs     03/28/22  1404 03/30/22  0536   MG 2.2 1.8     Albumin:    Recent Labs     03/28/22  1404 03/29/22  0500 03/30/22  0536   LABALBU 2.5* 2.5* 2.1*   SPEP:  Lab Results   Component Value Date    PROT 5.1 03/30/2022     Urinalysis/Chemistries:      Lab Results   Component Value Date    NITRU NEGATIVE 03/28/2022    COLORU Dark Yellow 03/28/2022    PHUR 5.5 03/28/2022    WBCUA 10 TO 20 03/28/2022    RBCUA 2 TO 5 03/28/2022    SPECGRAV 1.022 03/28/2022    LEUKOCYTESUR TRACE 03/28/2022    UROBILINOGEN Normal 03/28/2022    BILIRUBINUR NEGATIVE 03/28/2022    GLUCOSEU NEGATIVE 03/28/2022    KETUA NEGATIVE 03/28/2022     Urine Sodium:   No results found for: PRASHANT  Urine Potassium:  No results found for: KUR  Urine Chloride:  No results found for: CLUR  Urine Osmolarity: No results found for: OSMOU  Urine Protein:   No results found for: TPU  Urine Creatinine:   No results found for: LABCREA  Urine Eosinophils:  No components found for: UEOS    Radiology:     Reviewed. Assessment:     1. Acute Kidney Injury oliguric, prerenal likely secondary to ischemic ATN from cardiac arrest aggravated with use of IV contrast during admission. Patient remains anuric. He was started on Lasix 20 mg/h.  2.  Acute hypoxic respiratory failure requiring intubation. Patient is on ventilator dependent. 3.  S/p cardiac arrest currently intubated and off sedation on inotropic support  4. Acute blood loss anemia status post EGD   5. Acute metabolic acidosis resolved with IV sodium bicarbonate infusion - currently alkalotic  6. UTI with Enterococcus faecalis  7. Hypokalemia     Plan:     1. Continue with IV Lasix drip at 20 mL/h with a target urine output of 70 -100 mL per hour can increase Lasix drip to 40 mL/h. Recommend paracentesis to help increase in urine output  2. Renal ultrasound to look for cortical echogenicity and signs of obstruction/pyelonephritis  3.   We will get magnesium phosphorus and potassium check, BMP at 6 PM today  4. High risk for initiating dialysis if renal function continues to decline, family updated at bedside. 5.  Agree with IV potassium replacement  6. BMP in the a.m.  7.  We will follow  It. We will check renal ultrasound    Nutrition   Please ensure that patient is on a renal diet/TF. Avoid nephrotoxic drugs/contrast exposure. Thank you for the consultation. Please do not hesitate to contact us for any further questions/concerns. We will continue to follow along with you. Jam Saravia MD  Internal Medicine Resident, PGY- [facility]; [place], [place]  3/30/2022, 11:06 AM  Attending Physician Statement  I have discussed the care of Ayla Joya, including pertinent history and exam findings with the resident. I have reviewed the key elements of all parts of the encounter with the resident. Note was updated and recorded changes were made. I have seen and examined the patient with the resident. I agree with the assessment and plan and status of the problem list as documented. I had a long discussion with patient's sister and brother who was at bedside. According to them patient lives all by himself. His wife passed away a year ago and his 70-year-old son  few years ago. He is very close to his family. There are 7 siblings. And his brother states that he knows his brother very well and he knows what he wants in current situation. Current condition explained to them in length. Potential need for dialysis in the future. Patient's family is very much satisfied with the care and they will discuss among the family member and make a consensus decision   Virgil Pena MD        This note is created with the assistance of a speech-recognition program. While intending to generate a document that actually reflects the content of the visit, no guarantees can be provided that every mistake has been identified and corrected by editing.

## 2022-03-30 NOTE — PROCEDURES
Referring physician: Dr. Samuel Rouse  Date: 3/30/2022  Start Time: 3/29/2022 @ 1700  End Time: 3/30/2022 @ 1350    Indication  Patient with encephalopathy, EEG done to rule out subclinical seizures. Introduction  This continuous video-EEG was acquired using a LookFlow workstation at 256 samples/s. Electrodes were placed according to the International 10-20 system. Automated spike and seizure detection algorithms were applied. Video was recorded during this study. Description  The background consistent of diffuse none reactive polymorphic delta , later became complete suppression intermixed with vent artifact. . No consistent focal slowing or interhemispheric asymmetry was noted. Normal sleep structures were observed. There were no interictal epileptiform discharges or electrographic seizures. Events  3/28-29/2022 @ 1737  Clinically:  patient had whole body jerk. EEG: correlated with burst of delta rhythmic activity X 3 seconds. - Staff reported rhythmic tongue movements - no EEG correlation. Correlate clinically. 3/29-30/2022  Multiple events as above, tongue biting, posturing etc.  EEG did not correlate with obvious electrographic seizures. Impression  Abnormal continuous vEEG recording, the slowing mentioned above suggests severe non specific encephalopathy. Tongue/ jaw movements did not correlate with specific EEG pattern, correlate clinically. No changes from previous EEG. Crescencio Leon MD  Epilepsy Board Certified. Neurology Board Certified.     Electronically Signed

## 2022-03-30 NOTE — PROGRESS NOTES
INTENSIVE CARE UNIT  Resident Physician Progress Note    Patient - Aracelis Pearson  Date of Admission -  3/28/2022 10:29 AM  Date of Evaluation -  3/30/2022  Room and Bed Number -  [facility] 9 Day - 2  Chief Complaint   Patient presents with    Cardiac Arrest      SUBJECTIVE:     HISTORY OF PRESENT ILLNESS:    79-year-old male with a history of alcohol use, hypertension presented to the emergency room by EMS as a postarrest.  Patient had an unknown downtime and was found prone on the ground. No bystander CPR was done. EMS performed CPR for about 40 minutes, patient was initially found to be in PEA arrest.  An eye gel was used to establish airway. He was given 4 rounds of epinephrine during CPR and had ROSC on arrival.  Patient then became bradycardic and CPR is in seconds initiated, ROSC was achieved after 1 round and 1 mg of epi. Patient was started on epinephrine drip and intubated. Patient reportedly had hematemesis before intubation as well as brown/pink fluid return with his OG. He once again lost pulses and wanted to rest.  CPR was initiated and amp of bicarb was given and ROSC was obtained. Patient's hemoglobin was found to be 5.6. Imaging showed bilateral airspace disease, pleural effusions in the apices of the lungs, traumatic nondisplaced rib fractures, traumatic nondisplaced sternal fracture, substernal hematoma with active extravasation resting arterial bleed. 3/28: Epi drip was discontinued, dopamine drip started, sedation discontinued, arterial line placed in the right radial artery, patient placed on bicarb drip. Bedside EGD was done and showed active retropharyngeal bleeding, ENT was consulted. Octreotide drip, Protonix drip were discontinued. 3/29: 1 Unit PRBC overnight, No colonoscopy per GI instead repeat CT abdomen pelvis in AM    Consults: Neuro crit care, palliative care, CT surgery, ENT, GI    OVERNIGHT EVENTS:      Patient given another unit of blood overnight.  No acute events. OBJECTIVE:     VITAL SIGNS:  Patient Vitals for the past 8 hrs:   BP Temp Temp src Pulse Resp SpO2 Weight   22 0715    70 26 100 %    22 0700    69 (!) 0 100 %    22 0645    71 (!) 0 100 %    22 0630    68 (!) 0 100 %    22 0615    65 (!) 0 100 %    22 0600    66 (!) 0 100 %    22 0545    67 (!) 0 100 %    22 0530    64 (!) 0 100 %    22 0515    64 (!) 0 100 %    22 0500    65 (!) 0 99 %    22 0445    65 (!) 0 98 %    22 0430    71 (!) 0 98 %    22 0415    71 (!) 0 99 %    22 0408       260 lb 9.3 oz (118.2 kg)   22 0405    69 (!) 0 100 %    22 0400 (!) 138/56 95 °F (35 °C) Esophageal 70 11 99 %    22 0315    64 27 99 %    22 0300    64 22 99 %    22 0245    67 26 100 %    22 0230    67 17 100 %    22 0215    62 24 98 %    22 0200    72 20 99 %    22 0145    73 20 100 %    22 0130    70 20 100 %    22 0115    66 20 100 %    22 0100    68 20 100 %    22 0045    66 20 100 %    22 0030    66 20 100 %    22 0015    65 20 100 %    22 0000 (!) 150/60 95.9 °F (35.5 °C) Esophageal 67 20 100 %    22 2350    59 20 100 %    22 2345    67 20 100 %    22 2330    67 20 100 %        Last Body weight:   Wt Readings from Last 3 Encounters:   22 260 lb 9.3 oz (118.2 kg)   12/02/17 180 lb (81.6 kg)       Body Mass Index : Body mass index is 38.48 kg/m². Tmax over 24 hours: Temp (24hrs), Av.5 °F (36.4 °C), Min:95 °F (35 °C), Max:98.4 °F (36.9 °C)      Ins/Outs: In: 4716.5 [I.V.:1417.7;  Blood:335; NG/GT:2643]  Out: 77 [Urine:66]    PHYSICAL EXAM:  Constitutional: Not following commands or responding to pain  EENT: Pinpoint pupils, sclera clear, anicteric, neck supple with midline trachea. Neck: Supple, symmetrical, trachea midline, no adenopathy, thyroid symmetric, no jvd skin normal  Respiratory: Mechanical breath sounds  Cardiovascular: regular rate and rhythm, normal S1, S2, no murmur noted and 2+ pulses throughout  Abdomen: soft, nontender, distended,   Extremities:  peripheral pulses normal, bilateral lower extremity edema. no clubbing or cyanosis    MEDICATIONS:  Scheduled Meds:   insulin lispro  0-18 Units SubCUTAneous Q4H    ampicillin-sulbactam  3,000 mg IntraVENous Q6H    pantoprazole (PROTONIX) 40 mg injection  40 mg IntraVENous Q12H    rifaximin  200 mg Per NG tube TID    levetiracetam  1,000 mg IntraVENous Q12H    bisacodyl  20 mg Oral Once       Continuous Infusions:   sodium chloride      sodium bicarbonate infusion 50 mL/hr at 03/30/22 0720    dextrose      DOPamine 1 mcg/kg/min (03/30/22 0720)    fentaNYL Stopped (03/29/22 0532)       PRN Meds:   sodium chloride, , PRN  ondansetron, 4 mg, Q8H PRN   Or  ondansetron, 4 mg, Q6H PRN  polyethylene glycol, 17 g, Daily PRN  glucose, 15 g, PRN  dextrose, 12.5 g, PRN  glucagon (rDNA), 1 mg, PRN  dextrose, 100 mL/hr, PRN        SUPPORT DEVICES: [x] Ventilator [] BIPAP  [] Nasal Cannula [] Room Air    VENT SETTINGS (Comprehensive) (if applicable):   PRVC mode, FiO2 45%, PEEP 8, Respiratory Rate 20, Tidal Volume 560  Vent Information  $Ventilation: $Subsequent Day  Skin Assessment: Clean, dry, & intact  Equipment ID: [id number]  Equipment Changed: HME  Vent Type: Servo i  Vent Mode: PRVC  Vt Ordered: 560 mL  Rate Set: (S) 14 bmp (Post ABG)  FiO2 : (S) 50 %  SpO2: 100 %  SpO2/FiO2 ratio: 222.22  Sensitivity: 1  PEEP/CPAP: 8  I Time/ I Time %: (S) 0.9 s  Humidification Source: HME  Nitric Oxide/Epoprostenol In Use?: No  Additional Respiratory  Assessments  Pulse: 70  Resp: 26  SpO2: 100 %  End Tidal CO2: 23 (%)  Position: Semi-Pennington's  Humidification Source: HME  Oral Care Completed?: Yes  Oral Care: Mouth swabbed,Mouth suctioned  Subglottic Suction Done?: No  Cuff Pressure (cm H2O):  (mlt)  Lab Results   Component Value Date    MODE [facility] 03/30/2022       ABGs:   Arterial Blood Gas result:  pH 7.530; pCO2 26.8; pO2 61.4; HCO3 22.4; BE0; %O2 Sat 94. No results found for: PH, PCO2, PO2, HCO3, O2SAT    DATA:  Complete Blood Count:   Recent Labs     03/28/22  1042 03/28/22 1955 03/29/22  0500 03/29/22  1045 03/29/22 1731 03/29/22 2324 03/30/22  0536   WBC 12.8*  --  15.1*  --   --   --  9.9   RBC 2.61*  --  2.96*  --   --   --  2.73*   HGB 5.6*   < > 7.1*   < > 7.8* 7.3* 6.9*   HCT 20.0*   < > 22.0*   < > 23.3* 22.2* 20.4*   MCV 76.6*  --  74.3*  --   --   --  74.7*   MCH 21.5*  --  24.0*  --   --   --  25.3   MCHC 28.0*  --  32.3  --   --   --  33.8   RDW 17.9*  --  18.6*  --   --   --  19.9*     --  157  --   --   --  92*   MPV 11.2  --  10.7  --   --   --  10.3    < > = values in this interval not displayed.         Last 3 Blood Glucose:   Recent Labs     03/28/22  1042 03/28/22  1404 03/28/22  2303 03/29/22  0500 03/29/22 1731 03/30/22  0536   GLUCOSE 130* 195* 221* 98 129* 151*        PT/INR:    Lab Results   Component Value Date    PROTIME 20.0 03/28/2022    INR 2.0 03/28/2022     PTT:    Lab Results   Component Value Date    APTT 50.9 03/28/2022       Basic Metabolic Profile:   Recent Labs     03/29/22  0500 03/29/22 1731 03/30/22  0536    131* 142   K 3.7 3.6* 3.1*    99 105   CO2 18* 22 24   BUN 18 23 29*   CREATININE 2.08* 2.72* 3.23*   GLUCOSE 98 129* 151*       Liver Function:  Recent Labs     03/30/22  0536   PROT 5.1*   LABALBU 2.1*   ALT 37   AST 85*   ALKPHOS 63   BILITOT 1.53*       Magnesium:   Lab Results   Component Value Date    MG 2.2 03/28/2022     Phosphorus: No results found for: PHOS  Ionized Calcium:   Lab Results   Component Value Date    CAION 0.98 03/30/2022    CAION 1.05 03/29/2022    CAION 1.01 03/28/2022        Urinalysis:   Lab Results   Component Value Date    NITRU NEGATIVE 03/28/2022    COLORU Dark Yellow 03/28/2022    PHUR 5.5 03/28/2022    WBCUA 10 TO 20 03/28/2022    RBCUA 2 TO 5 03/28/2022    SPECGRAV 1.022 03/28/2022    LEUKOCYTESUR TRACE 03/28/2022    UROBILINOGEN Normal 03/28/2022    BILIRUBINUR NEGATIVE 03/28/2022    GLUCOSEU NEGATIVE 03/28/2022    KETUA NEGATIVE 03/28/2022       HgBA1c:  No results found for: LABA1C  TSH:  No results found for: TSH  Lactic Acid: No results found for: LACTA   Troponin: No results for input(s): TROPONINI in the last 72 hours. Other Labs:  Results for orders placed or performed during the hospital encounter of 03/28/22   Culture, Urine    Specimen: Urine, straight catheter   Result Value Ref Range    Specimen Description . URINE,STRAIGHT CATHETER     Culture ENTEROCOCCUS FAECALIS >168127 CFU/ML (A)        Susceptibility    Enterococcus  faecalis - BACTERIAL SUSCEPTIBILITY PANEL CARL     ampicillin <=2 Sensitive      ciprofloxacin <=0.5 Sensitive      levofloxacin 1 Sensitive      nitrofurantoin <=16 Sensitive      tetracycline >=16 Resistant      vancomycin 1 Sensitive    COVID-19, Rapid    Specimen: Nasopharyngeal Swab   Result Value Ref Range    Specimen Description . NASOPHARYNGEAL SWAB     SARS-CoV-2, Rapid Not Detected Not Detected   Culture, Respiratory    Specimen: Sputum, Suctioned   Result Value Ref Range    Specimen Description . SUCTIONED SPUTUM     Direct Exam < 10 EPITHELIAL CELLS/LPF     Direct Exam >10, <25 NEUTROPHILS/LPF     Direct Exam (A)      PREDOMINANT ORGANISM: GRAM POSITIVE COCCI IN CLUSTERS    Culture CULTURE IN PROGRESS    MRSA DNA Probe, Nasal    Specimen: Nasal   Result Value Ref Range    Specimen Description . NASAL SWAB     MRSA, DNA, Nasal NEGATIVE NEGATIVE   CBC with Auto Differential   Result Value Ref Range    WBC 12.8 (H) 3.5 - 11.3 k/uL    RBC 2.61 (L) 4.21 - 5.77 m/uL    Hemoglobin 5.6 (LL) 13.0 - 17.0 g/dL    Hematocrit 20.0 (L) 40.7 - 50.3 %    MCV 76.6 (L) 82.6 - 102.9 fL    MCH 21.5 (L) 25.2 - 33.5 pg MCHC 28.0 (L) 28.4 - 34.8 g/dL    RDW 17.9 (H) 11.8 - 14.4 %    Platelets 640 221 - 569 k/uL    MPV 11.2 8.1 - 13.5 fL    NRBC Automated 0.8 (H) 0.0 per 100 WBC    Immature Granulocytes 4 (H) 0 %    Seg Neutrophils 52 36 - 66 %    Lymphocytes 35 24 - 44 %    Monocytes 7 1 - 7 %    Eosinophils % 1 1 - 4 %    Basophils 1 0 - 2 %    nRBC 1 (H) 0 per 100 WBC    Absolute Immature Granulocyte 0.51 (H) 0.00 - 0.30 k/uL    Segs Absolute 6.65 1.8 - 7.7 k/uL    Absolute Lymph # 4.48 1.0 - 4.8 k/uL    Absolute Mono # 0.90 (H) 0.1 - 0.8 k/uL    Absolute Eos # 0.13 0.0 - 0.4 k/uL    Basophils Absolute 0.13 0.0 - 0.2 k/uL    Morphology ANISOCYTOSIS PRESENT     Morphology MICROCYTOSIS PRESENT     Morphology HYPOCHROMIA PRESENT     Morphology 1+ POLYCHROMASIA    Comprehensive Metabolic Panel w/ Reflex to MG   Result Value Ref Range    Glucose 130 (H) 70 - 99 mg/dL    BUN 12 8 - 23 mg/dL    CREATININE 0.96 0.70 - 1.20 mg/dL    Calcium 8.0 (L) 8.6 - 10.4 mg/dL    Sodium 136 135 - 144 mmol/L    Potassium 4.1 3.7 - 5.3 mmol/L    Chloride 108 (H) 98 - 107 mmol/L    CO2 12 (L) 20 - 31 mmol/L    Anion Gap 16 9 - 17 mmol/L    Alkaline Phosphatase 101 40 - 129 U/L    ALT 16 5 - 41 U/L    AST 38 <40 U/L    Total Bilirubin 0.89 0.3 - 1.2 mg/dL    Total Protein 6.0 (L) 6.4 - 8.3 g/dL    Albumin 2.6 (L) 3.5 - 5.2 g/dL    Albumin/Globulin Ratio 0.8 (L) 1.0 - 2.5    GFR Non-African American >60 >60 mL/min    GFR African American >60 >60 mL/min    GFR Comment         Troponin   Result Value Ref Range    Troponin, High Sensitivity 12 0 - 22 ng/L   Brain Natriuretic Peptide   Result Value Ref Range    Pro-BNP 1,512 (H) <300 pg/mL   Protime-INR   Result Value Ref Range    Protime 16.3 (H) 9.1 - 12.3 sec    INR 1.6    APTT   Result Value Ref Range    PTT 81.1 (H) 20.5 - 30.5 sec   Lactic Acid   Result Value Ref Range    Lactic Acid, Whole Blood 11.2 (H) 0.7 - 2.1 mmol/L   Urinalysis with Microscopic   Result Value Ref Range    Color, UA Dark Yellow (A) Yellow    Turbidity UA Cloudy (A) Clear    Glucose, Ur NEGATIVE NEGATIVE    Bilirubin Urine NEGATIVE NEGATIVE    Ketones, Urine NEGATIVE NEGATIVE    Specific Gravity, UA 1.022 1.005 - 1.030    Urine Hgb MODERATE (A) NEGATIVE    pH, UA 5.5 5.0 - 8.0    Protein, UA 2+ (A) NEGATIVE    Urobilinogen, Urine Normal Normal    Nitrite, Urine NEGATIVE NEGATIVE    Leukocyte Esterase, Urine TRACE (A) NEGATIVE    -          WBC, UA 10 TO 20 0 - 5 /HPF    RBC, UA 2 TO 5 0 - 4 /HPF    Casts UA  0 - 8 /LPF     20 TO 50 Reference range defined for non-centrifuged specimen.     Epithelial Cells UA 10 TO 20 0 - 5 /HPF   Blood Gas, Venous   Result Value Ref Range    pH, Jez 6.850 (LL) 7.320 - 7.420    pCO2, Jez 78.2 (H) 39 - 55    pO2, Jez 92.2 (H) 30 - 50    HCO3, Venous 12.9 (L) 24 - 30 mmol/L    Negative Base Excess, Jez 19.0 (H) 0.0 - 2.0 mmol/L    O2 Sat, Jez 85.9 (H) 60.0 - 85.0 %    Carboxyhemoglobin 2.3 0 - 5 %    Pt Temp 37.0     FIO2 INFORMATION NOT PROVIDED    ELECTROLYTES PLUS   Result Value Ref Range    POC Sodium 141 138 - 146 mmol/L    POC Potassium 4.1 3.5 - 4.5 mmol/L    POC Chloride 108 (H) 98 - 107 mmol/L    POC TCO2 17 (L) 22 - 30 mmol/L    Anion Gap 17 (H) 7 - 16 mmol/L   Hemoglobin and hematocrit, blood   Result Value Ref Range    POC Hemoglobin 7.7 (L) 13.5 - 17.5 g/dL    POC Hematocrit 23 (L) 41 - 53 %   CALCIUM, IONIC (POC)   Result Value Ref Range    POC Ionized Calcium 1.22 1.15 - 1.33 mmol/L   TOX SCR, BLD, ED   Result Value Ref Range    Acetaminophen Level <5 (L) 10 - 30 ug/mL    Ethanol <10 <10 mg/dL    Ethanol percent <8.858 <1.383 %    Salicylate Lvl <1 (L) 3 - 10 mg/dL    Toxic Tricyclic Sc,Blood NEGATIVE NEGATIVE   ELECTROLYTES PLUS   Result Value Ref Range    POC Sodium 141 138 - 146 mmol/L    POC Potassium 3.6 3.5 - 4.5 mmol/L    POC Chloride 106 98 - 107 mmol/L    POC TCO2 15 (L) 22 - 30 mmol/L    Anion Gap 21 (H) 7 - 16 mmol/L   Hemoglobin and hematocrit, blood   Result Value Ref Range    POC Hemoglobin 7.2 (L) 13.5 - 17.5 g/dL    POC Hematocrit 21 (L) 41 - 53 %   CALCIUM, IONIC (POC)   Result Value Ref Range    POC Ionized Calcium 1.14 (L) 1.15 - 1.33 mmol/L   Basic Metabolic Panel w/ Reflex to MG   Result Value Ref Range    Glucose 195 (H) 70 - 99 mg/dL    BUN 13 8 - 23 mg/dL    CREATININE 1.12 0.70 - 1.20 mg/dL    Calcium 7.5 (L) 8.6 - 10.4 mg/dL    Sodium 134 (L) 135 - 144 mmol/L    Potassium 3.5 (L) 3.7 - 5.3 mmol/L    Chloride 104 98 - 107 mmol/L    CO2 10 (L) 20 - 31 mmol/L    Anion Gap 20 (H) 9 - 17 mmol/L    GFR Non-African American >60 >60 mL/min    GFR African American >60 >60 mL/min    GFR Comment         Hepatic function panel   Result Value Ref Range    Albumin 2.5 (L) 3.5 - 5.2 g/dL    Alkaline Phosphatase 99 40 - 129 U/L    ALT 35 5 - 41 U/L    AST 88 (H) <40 U/L    Total Bilirubin 1.34 (H) 0.3 - 1.2 mg/dL    Bilirubin, Direct 0.87 (H) <0.31 mg/dL    Bilirubin, Indirect 0.47 0.00 - 1.00 mg/dL    Total Protein 5.7 (L) 6.4 - 8.3 g/dL    Albumin/Globulin Ratio 0.8 (L) 1.0 - 2.5   Calcium, Ionized   Result Value Ref Range    Calcium, Ion 1.01 (L) 1.13 - 1.33 mmol/L   Troponin   Result Value Ref Range    Troponin, High Sensitivity 42 (H) 0 - 22 ng/L   Protime-INR   Result Value Ref Range    Protime 20.0 (H) 9.1 - 12.3 sec    INR 2.0    APTT   Result Value Ref Range    PTT 50.9 (H) 20.5 - 30.5 sec   Ammonia   Result Value Ref Range    Ammonia 64 (H) 16 - 60 umol/L   Magnesium   Result Value Ref Range    Magnesium 2.2 1.6 - 2.6 mg/dL   Ammonia   Result Value Ref Range    Ammonia 65 (H) 16 - 60 umol/L   Basic Metabolic Panel w/ Reflex to MG   Result Value Ref Range    Glucose 98 70 - 99 mg/dL    BUN 18 8 - 23 mg/dL    CREATININE 2.08 (H) 0.70 - 1.20 mg/dL    Calcium 7.3 (L) 8.6 - 10.4 mg/dL    Sodium 138 135 - 144 mmol/L    Potassium 3.7 3.7 - 5.3 mmol/L    Chloride 104 98 - 107 mmol/L    CO2 18 (L) 20 - 31 mmol/L    Anion Gap 16 9 - 17 mmol/L    GFR Non-African American 32 (L) >60 mL/min    GFR  39 (L) >60 mL/min    GFR Comment         Calcium, Ionized   Result Value Ref Range    Calcium, Ion 1.05 (L) 1.13 - 1.33 mmol/L   CBC with Auto Differential   Result Value Ref Range    WBC 15.1 (H) 3.5 - 11.3 k/uL    RBC 2.96 (L) 4.21 - 5.77 m/uL    Hemoglobin 7.1 (L) 13.0 - 17.0 g/dL    Hematocrit 22.0 (L) 40.7 - 50.3 %    MCV 74.3 (L) 82.6 - 102.9 fL    MCH 24.0 (L) 25.2 - 33.5 pg    MCHC 32.3 28.4 - 34.8 g/dL    RDW 18.6 (H) 11.8 - 14.4 %    Platelets 580 905 - 931 k/uL    MPV 10.7 8.1 - 13.5 fL    NRBC Automated 0.1 (H) 0.0 per 100 WBC    Immature Granulocytes 0 0 %    Seg Neutrophils 83 (H) 36 - 66 %    Lymphocytes 10 (L) 24 - 44 %    Monocytes 7 1 - 7 %    Eosinophils % 0 (L) 1 - 4 %    Basophils 0 0 - 2 %    Absolute Immature Granulocyte 0.00 0.00 - 0.30 k/uL    Segs Absolute 12.53 (H) 1.8 - 7.7 k/uL    Absolute Lymph # 1.51 1.0 - 4.8 k/uL    Absolute Mono # 1.06 (H) 0.1 - 0.8 k/uL    Absolute Eos # 0.00 0.0 - 0.4 k/uL    Basophils Absolute 0.00 0.0 - 0.2 k/uL    Morphology ANISOCYTOSIS PRESENT     Morphology MICROCYTOSIS PRESENT     Morphology 1+ POLYCHROMASIA    Hemoglobin and Hematocrit   Result Value Ref Range    Hemoglobin 6.6 (LL) 13.0 - 17.0 g/dL    Hematocrit 22.1 (L) 40.7 - 50.3 %   Hepatic function panel   Result Value Ref Range    Albumin 2.5 (L) 3.5 - 5.2 g/dL    Alkaline Phosphatase 82 40 - 129 U/L    ALT 41 5 - 41 U/L    AST 81 (H) <40 U/L    Total Bilirubin 2.08 (H) 0.3 - 1.2 mg/dL    Bilirubin, Direct 0.78 (H) <0.31 mg/dL    Bilirubin, Indirect 1.30 (H) 0.00 - 1.00 mg/dL    Total Protein 5.7 (L) 6.4 - 8.3 g/dL    Albumin/Globulin Ratio 0.8 (L) 1.0 - 2.5   Lactic Acid   Result Value Ref Range    Lactic Acid, Whole Blood 11.4 (H) 0.7 - 2.1 mmol/L   Lactic Acid   Result Value Ref Range    Lactic Acid, Whole Blood 5.3 (H) 0.7 - 2.1 mmol/L   Troponin   Result Value Ref Range    Troponin, High Sensitivity 83 (HH) 0 - 22 ng/L   Troponin   Result Value Ref Range    Troponin, High Sensitivity 94 (HH) 0 - 22 ng/L   Lactic Acid   Result Value Ref Range    Lactic Acid, Whole Blood 3.7 (H) 0.7 - 2.1 mmol/L   Hemoglobin and Hematocrit   Result Value Ref Range    Hemoglobin 7.4 (L) 13.0 - 17.0 g/dL    Hematocrit 23.6 (L) 40.7 - 50.3 %   Hemoglobin and Hematocrit   Result Value Ref Range    Hemoglobin 8.3 (L) 13.0 - 17.0 g/dL    Hematocrit 24.9 (L) 40.7 - 50.3 %   Basic Metabolic Panel   Result Value Ref Range    Glucose 221 (H) 70 - 99 mg/dL    BUN 15 8 - 23 mg/dL    CREATININE 1.62 (H) 0.70 - 1.20 mg/dL    Calcium 7.3 (L) 8.6 - 10.4 mg/dL    Sodium 134 (L) 135 - 144 mmol/L    Potassium 3.7 3.7 - 5.3 mmol/L    Chloride 101 98 - 107 mmol/L    CO2 14 (L) 20 - 31 mmol/L    Anion Gap 19 (H) 9 - 17 mmol/L    GFR Non-African American 43 (L) >60 mL/min    GFR  52 (L) >60 mL/min    GFR Comment         Lactic Acid   Result Value Ref Range    Lactic Acid, Whole Blood 2.3 (H) 0.7 - 2.1 mmol/L   Hemoglobin and Hematocrit   Result Value Ref Range    Hemoglobin 7.8 (L) 13.0 - 17.0 g/dL    Hematocrit 23.3 (L) 40.7 - 50.3 %   Troponin   Result Value Ref Range    Troponin, High Sensitivity 108 (HH) 0 - 22 ng/L   Lactic Acid   Result Value Ref Range    Lactic Acid, Whole Blood 2.2 (H) 0.7 - 2.1 mmol/L   Basic Metabolic Panel   Result Value Ref Range    Glucose 129 (H) 70 - 99 mg/dL    BUN 23 8 - 23 mg/dL    CREATININE 2.72 (H) 0.70 - 1.20 mg/dL    Calcium 7.2 (L) 8.6 - 10.4 mg/dL    Sodium 131 (L) 135 - 144 mmol/L    Potassium 3.6 (L) 3.7 - 5.3 mmol/L    Chloride 99 98 - 107 mmol/L    CO2 22 20 - 31 mmol/L    Anion Gap 10 9 - 17 mmol/L    GFR Non-African American 24 (L) >60 mL/min    GFR  29 (L) >60 mL/min    GFR Comment         Hemoglobin and Hematocrit   Result Value Ref Range    Hemoglobin 7.3 (L) 13.0 - 17.0 g/dL    Hematocrit 22.2 (L) 40.7 - 50.3 %   Basic Metabolic Panel w/ Reflex to MG   Result Value Ref Range    Glucose 151 (H) 70 - 99 mg/dL    BUN 29 (H) 8 - 23 mg/dL CREATININE 3.23 (H) 0.70 - 1.20 mg/dL    Calcium 7.0 (L) 8.6 - 10.4 mg/dL    Sodium 142 135 - 144 mmol/L    Potassium 3.1 (L) 3.7 - 5.3 mmol/L    Chloride 105 98 - 107 mmol/L    CO2 24 20 - 31 mmol/L    Anion Gap 13 9 - 17 mmol/L    GFR Non-African American 19 (L) >60 mL/min    GFR  23 (L) >60 mL/min    GFR Comment         Calcium, Ionized   Result Value Ref Range    Calcium, Ion 0.98 (L) 1.13 - 1.33 mmol/L   CBC with Auto Differential   Result Value Ref Range    WBC 9.9 3.5 - 11.3 k/uL    RBC 2.73 (L) 4.21 - 5.77 m/uL    Hemoglobin 6.9 (LL) 13.0 - 17.0 g/dL    Hematocrit 20.4 (L) 40.7 - 50.3 %    MCV 74.7 (L) 82.6 - 102.9 fL    MCH 25.3 25.2 - 33.5 pg    MCHC 33.8 28.4 - 34.8 g/dL    RDW 19.9 (H) 11.8 - 14.4 %    Platelets 92 (L) 629 - 453 k/uL    MPV 10.3 8.1 - 13.5 fL    NRBC Automated 0.0 0.0 per 100 WBC    RBC Morphology ANISOCYTOSIS PRESENT     Seg Neutrophils 80 (H) 36 - 65 %    Lymphocytes 10 (L) 24 - 43 %    Monocytes 9 3 - 12 %    Eosinophils % 0 (L) 1 - 4 %    Basophils 0 0 - 2 %    Immature Granulocytes 0 0 %    Segs Absolute 7.95 1.50 - 8.10 k/uL    Absolute Lymph # 1.03 (L) 1.10 - 3.70 k/uL    Absolute Mono # 0.86 0.10 - 1.20 k/uL    Absolute Eos # <0.03 0.00 - 0.44 k/uL    Basophils Absolute <0.03 0.00 - 0.20 k/uL    Absolute Immature Granulocyte 0.04 0.00 - 0.30 k/uL   Hepatic function panel   Result Value Ref Range    Albumin 2.1 (L) 3.5 - 5.2 g/dL    Alkaline Phosphatase 63 40 - 129 U/L    ALT 37 5 - 41 U/L    AST 85 (H) <40 U/L    Total Bilirubin 1.53 (H) 0.3 - 1.2 mg/dL    Bilirubin, Direct 0.93 (H) <0.31 mg/dL    Bilirubin, Indirect 0.60 0.00 - 1.00 mg/dL    Total Protein 5.1 (L) 6.4 - 8.3 g/dL    Albumin/Globulin Ratio 0.7 (L) 1.0 - 2.5   Lactic Acid   Result Value Ref Range    Lactic Acid, Whole Blood 2.0 0.7 - 2.1 mmol/L   Venous Blood Gas, POC   Result Value Ref Range    pH, Jez 6.772 (LL) 7.320 - 7.430    pCO2, Jez 96.4 (HH) 41.0 - 51.0 mm Hg    pO2, Jez 40.4 30.0 - 50.0 mm Hg    HCO3, Venous 14.1 (L) 22.0 - 29.0 mmol/L    Negative Base Excess, Jez 20 (H) 0.0 - 2.0    O2 Sat, Jez 34 (L) 60.0 - 85.0 %   Creatinine W/GFR Point of Care   Result Value Ref Range    POC Creatinine 1.22 (H) 0.51 - 1.19 mg/dL    GFR Comment >60 >60 mL/min    GFR Non- 59 (L) >60 mL/min    GFR Comment         POCT urea (BUN)   Result Value Ref Range    POC BUN 12 8 - 26 mg/dL   Lactic Acid, POC   Result Value Ref Range    POC Lactic Acid 10.97 (H) 0.56 - 1.39 mmol/L   POCT Glucose   Result Value Ref Range    POC Glucose 130 (H) 74 - 100 mg/dL   Arterial Blood Gas, POC   Result Value Ref Range    POC pH 7.098 (LL) 7.350 - 7.450    POC pCO2 44.3 35.0 - 48.0 mm Hg    POC PO2 71.5 (L) 83.0 - 108.0 mm Hg    POC HCO3 13.7 (L) 21.0 - 28.0 mmol/L    Negative Base Excess, Art 15 (H) 0.0 - 2.0    POC O2 SAT 87 (L) 94.0 - 98.0 %    O2 Device/Flow/% Adult Ventilator     Sample Site Right Radial Artery     FIO2 100.0    Creatinine W/GFR Point of Care   Result Value Ref Range    POC Creatinine 1.19 0.51 - 1.19 mg/dL    GFR Comment >60 >60 mL/min    GFR Non-African American >60 >60 mL/min    GFR Comment         POCT urea (BUN)   Result Value Ref Range    POC BUN 11 8 - 26 mg/dL   Lactic Acid, POC   Result Value Ref Range    POC Lactic Acid 11.73 (H) 0.56 - 1.39 mmol/L   POCT Glucose   Result Value Ref Range    POC Glucose 131 (H) 74 - 100 mg/dL   POC Glucose Fingerstick   Result Value Ref Range    POC Glucose 180 (H) 75 - 110 mg/dL   POC Glucose Fingerstick   Result Value Ref Range    POC Glucose 208 (H) 75 - 110 mg/dL   Arterial Blood Gas, POC   Result Value Ref Range    POC pH 7.304 (L) 7.350 - 7.450    POC pCO2 25.1 (L) 35.0 - 48.0 mm Hg    POC PO2 376.2 (H) 83.0 - 108.0 mm Hg    POC HCO3 12.5 (L) 21.0 - 28.0 mmol/L    Negative Base Excess, Art 13 (H) 0.0 - 2.0    POC O2  (H) 94.0 - 98.0 %    Nagi Test NOT APPLICABLE     Sample Site Arterial Line     FIO2 100.0     Pt Temp 34.5     POC pH Temp 7.34 POC pCO2 Temp 23 mm Hg    POC pO2 Temp 363 mm Hg   Arterial Blood Gas, POC   Result Value Ref Range    POC pH 7.310 (L) 7.350 - 7.450    POC pCO2 26.6 (L) 35.0 - 48.0 mm Hg    POC PO2 95.2 83.0 - 108.0 mm Hg    POC HCO3 13.4 (L) 21.0 - 28.0 mmol/L    Negative Base Excess, Art 12 (H) 0.0 - 2.0    POC O2 SAT 97 94.0 - 98.0 %    O2 Device/Flow/% Adult Ventilator     Nagi Test NOT APPLICABLE     Sample Site Arterial Line     Mode PRVC     FIO2 50.0     Pt Temp 35.4     POC pH Temp 7.33     POC pCO2 Temp 25 mm Hg    POC pO2 Temp 86 mm Hg   Lactic Acid, POC   Result Value Ref Range    POC Lactic Acid 10.45 (H) 0.56 - 1.39 mmol/L   POCT Glucose   Result Value Ref Range    POC Glucose 238 (H) 74 - 100 mg/dL   POC Glucose Fingerstick   Result Value Ref Range    POC Glucose 174 (H) 75 - 110 mg/dL   Arterial Blood Gas, POC   Result Value Ref Range    POC pH 7.406 7.350 - 7.450    POC pCO2 28.9 (L) 35.0 - 48.0 mm Hg    POC PO2 93.7 83.0 - 108.0 mm Hg    POC HCO3 18.2 (L) 21.0 - 28.0 mmol/L    Negative Base Excess, Art 6 (H) 0.0 - 2.0    POC O2 SAT 98 94.0 - 98.0 %    O2 Device/Flow/% Adult Ventilator     Nagi Test NOT APPLICABLE     Sample Site Arterial Line     Mode PRVC     FIO2 50.0     Pt Temp 38.0     POC pH Temp 7.39     POC pCO2 Temp 30 mm Hg    POC pO2 Temp 100 mm Hg   Lactic Acid, POC   Result Value Ref Range    POC Lactic Acid 6.10 (H) 0.56 - 1.39 mmol/L   POCT Glucose   Result Value Ref Range    POC Glucose 200 (H) 74 - 100 mg/dL   POC Glucose Fingerstick   Result Value Ref Range    POC Glucose 103 75 - 110 mg/dL   Arterial Blood Gas, POC   Result Value Ref Range    POC pH 7.416 7.350 - 7.450    POC pCO2 33.1 (L) 35.0 - 48.0 mm Hg    POC PO2 74.2 (L) 83.0 - 108.0 mm Hg    POC HCO3 21.3 21.0 - 28.0 mmol/L    Negative Base Excess, Art 3 (H) 0.0 - 2.0    POC O2 SAT 95 94.0 - 98.0 %    O2 Device/Flow/% Adult Ventilator     Nagi Test NOT APPLICABLE     Sample Site Arterial Line     Mode PRVC     FIO2 50.0     Pt Temp 38.3     POC pH Temp 7.40     POC pCO2 Temp 35 mm Hg    POC pO2 Temp 81 mm Hg   POC Glucose Fingerstick   Result Value Ref Range    POC Glucose 82 75 - 110 mg/dL   POC Glucose Fingerstick   Result Value Ref Range    POC Glucose 89 75 - 110 mg/dL   POC Glucose Fingerstick   Result Value Ref Range    POC Glucose 124 (H) 75 - 110 mg/dL   POC Glucose Fingerstick   Result Value Ref Range    POC Glucose 119 (H) 75 - 110 mg/dL   POC Glucose Fingerstick   Result Value Ref Range    POC Glucose 133 (H) 75 - 110 mg/dL   POC Glucose Fingerstick   Result Value Ref Range    POC Glucose 116 (H) 75 - 110 mg/dL   Arterial Blood Gas, POC   Result Value Ref Range    POC pH 7.530 (H) 7.350 - 7.450    POC pCO2 26.8 (L) 35.0 - 48.0 mm Hg    POC PO2 61.4 (L) 83.0 - 108.0 mm Hg    POC HCO3 22.4 21.0 - 28.0 mmol/L    Positive Base Excess, Art 0 0.0 - 3.0    POC O2 SAT 94 94.0 - 98.0 %    Sample Site Arterial Line     Mode PRVC     FIO2 45.0     Pt Temp 35.0     POC pH Temp 7.56     POC pCO2 Temp 25 mm Hg    POC pO2 Temp 54 mm Hg   POCT Glucose   Result Value Ref Range    POC Glucose 143 (H) 74 - 100 mg/dL   EKG 12 Lead   Result Value Ref Range    Ventricular Rate 64 BPM    Atrial Rate 64 BPM    P-R Interval 176 ms    QRS Duration 98 ms    Q-T Interval 452 ms    QTc Calculation (Bazett) 466 ms    P Axis 17 degrees    R Axis -19 degrees    T Axis 96 degrees   TYPE AND SCREEN   Result Value Ref Range    Expiration Date 03/29/2022,2359     Arm Band Number [id number]     ABO/Rh O POSITIVE     Antibody Screen NEGATIVE     Unit Number [id number]     Product Code Leukocyte Reduced Red Cell     Unit Divison 00     Dispense Status TRANSFUSED     Unit Issue Date/Time 777839900212     Blood Bank Unit Type and Rh O NEG     Blood Bank ISBT Product Blood Type 9500     Blood Bank Blood Product Expiration Date 883815661011     Transfusion Status OK TO TRANSFUSE     Crossmatch Result COMPATIBLE     Unit Number [id number]     Product Code Leukocyte Reduced Red Cell     Unit Divison 00     Dispense Status REL FROM [facility]     Transfusion Status OK TO TRANSFUSE     Crossmatch Result COMPATIBLE     Unit Number [id number]     Product Code Leukocyte Reduced Red Cell     Unit Divison 00     Dispense Status TRANSFUSED     Unit Issue Date/Time 983019640485     Blood Bank Unit Type and Rh O NEG     Blood Bank ISBT Product Blood Type 9500     Blood Bank Blood Product Expiration Date 310292774594     Transfusion Status OK TO TRANSFUSE     Crossmatch Result COMPATIBLE     Unit Number [id number]     Product Code Leukocyte Reduced Red Cell     Unit Divison 00     Dispense Status TRANSFUSED     Unit Issue Date/Time 854896652651     Blood Bank Unit Type and Rh O POS     Blood Bank ISBT Product Blood Type 5100     Blood Bank Blood Product Expiration Date 112322151604     Transfusion Status OK TO TRANSFUSE     Crossmatch Result COMPATIBLE     Unit Number [id number]     Product Code Leukocyte Reduced Red Cell     Unit Divison 00     Dispense Status ISSUED     Unit Issue Date/Time 926472290262     Blood Bank Unit Type and Rh O POS     Blood Bank ISBT Product Blood Type 5100     Blood Bank Blood Product Expiration Date 841731688726     Transfusion Status OK TO TRANSFUSE     Crossmatch Result COMPATIBLE     Unit Number [id number]     Product Code Leukocyte Reduced Red Cell     Unit Divison 00     Dispense Status REL FROM [facility]     Transfusion Status OK TO TRANSFUSE     Crossmatch Result COMPATIBLE        Radiology/Imaging:  CT ABDOMEN PELVIS WO CONTRAST Additional Contrast? None   Preliminary Result   1. Cirrhosis, ascites and anasarca. 2. No bowel obstruction identified. 3. Mild distention of the colon which could indicate an ileus. 4. Worsened bilateral pleural effusions and a worsened mild-to-moderate   pericardial effusion. CT CHEST WO CONTRAST   Final Result   1. Interval decrease in size of substernal hematoma.       2.  Increasing right pleural effusion, now moderate in size. Blood products   in this area appear extrapleural.  Similar appearance of low-density left   pleural effusion. 3.  Redemonstration of sternal and bilateral rib fractures with overlying   subcutaneous edema. 4.  Cirrhotic liver morphology and ascites. XR ABDOMEN FOR NG/OG/NE TUBE PLACEMENT   Final Result   Enteric tube looped within the body of the stomach with the tip directed   superiorly towards the fundus of the stomach. XR ABDOMEN FOR NG/OG/NE TUBE PLACEMENT   Final Result   No change in position of the tip of endotracheal tube. If this has been   advanced., Question it may be coiled off the field of view of the   radiograph. .  If warranted, consider chest x-ray or soft tissue neck imaging. Otherwise, consider advancement 10-15 cm. The findings were sent to the Radiology Results [address] at 1:09   am on 3/29/2022to be communicated to a licensed caregiver. XR ABDOMEN FOR NG/OG/NE TUBE PLACEMENT   Final Result   Enteric tube terminating proximal to the GE junction. This should be   advanced approximately 10 cm, and reimaged. XR ABDOMEN FOR NG/OG/NE TUBE PLACEMENT   Final Result   Enteric tube terminates the level of the GE junction. This needs advanced   least 10 cm. The findings were sent to the Radiology Results [address] at 8:49   pm on 3/28/2022to be communicated to a licensed caregiver. CTA CHEST W WO CONTRAST   Final Result   Acute traumatic nondisplaced to minimally displaced fractures of the right   anterior 3rd, 4th, 7th and 8th ribs, left anterior 2nd, 3rd, 4th, 5th, 6th   and 7th ribs as well as the right anterior 4th costochondral cartilage. Acute traumatic nondisplaced fracture of the body of the sternum. Substernal hematoma with small focus of active extravasation of contrast   suggesting arterial bleeding.       Hematomas in the subpleural space deep to the right costochondral cartilage   fractures and anterior rib fractures with foci of active extravasation of   contrast suggesting active bleeding from the right internal thoracic artery. Bilateral pleural effusions. Right perihilar upper lobe and lower lobe ground-glass opacities suggesting   pulmonary contusions. No pneumothorax. No acute traumatic injury of the thoracic aorta. Upper abdominal ascites. CTA ABDOMEN PELVIS W CONTRAST   Preliminary Result   No acute traumatic injury of the abdomen or pelvis. Findings of hepatic cirrhosis with abdominal and pelvic ascites possibly   related to the cirrhosis. Mildly prominent loops of bowel throughout the abdomen and pelvis with   mucosal enhancement. This may be secondary to mild ileus or shock bowel. No   evidence of bowel obstruction. No acute traumatic injury of the aorta. No active extravasation of contrast.      See separate CT of the chest for discussion of those findings. CT Head WO Contrast   Final Result   No acute intracranial abnormality. CT Cervical Spine WO Contrast   Final Result   No acute traumatic injury of the cervical spine. Mild multilevel   degenerative changes. Pleural effusions in the lung apices. XR CHEST PORTABLE   Final Result   1. Endotracheal tube terminates 2 cm above the matthew. Enteric tube   terminates at the body of the stomach. 2.  Bilateral airspace disease, which may represent inflammatory process or   multifocal infection.              ASSESSMENT:     Patient Active Problem List    Diagnosis Date Noted    [facility] 03[facility]28/2022    Acute blood loss anemia     Hematemesis     Alcoholic cirrhosis of liver with ascites (HCC)     Alcohol intoxication in alcoholism with blood level over 0.3 without complication ([facility] 03/87/4491        PLAN:      WEAN PER PROTOCOL:  []   No  [x]   Yes []   N/A                         ICU PROPHYLAXIS: Stress ulcer:  [x]   PPI Agent []   [id number] []   Sucralfate []   Other []   None      VTE:  []   Enoxaparin []   SQ Heparin []   EPC Cuffs []  Other                       NUTRITION:   [x]  NPO []   TF:  []   TPN []   PO                       HOME MEDS RECONCILED:  []   No  []   Yes                           CONSULTATION NEEDED:  []   No  [x]   Yes                           FAMILY UPDATED:  []   No  [x]   Yes                           TRANSFER OUT OF ICU:  [x]   No  []   Yes             PLAN/MEDICAL DECISION MAKING:  Neurologic:   · Neuro crit care consult  · LTME  · MRI at 72h  · Neuro checks per protocol  · Palliative care consulted  Cardiovascular:  · Hemodynamically stable  · MAP goal 65  · Dopamine drip at 1mcg  · CT surgery on board   · Repeat CT showed decreased substernal hematoma, CT surg signed off   · Hgb> 7  · Cardiology on board, recommend ischemia work up if meaningful neurological recovery  · Bicarb drip  · Troponin elevated to 108   · Echo F/u read  Pulmonary:  · Maintain oxygen sats >92%  · Pulmonary toilet  Vent Information  $Ventilation: $Subsequent Day  Skin Assessment: Clean, dry, & intact  Equipment ID: [id number]  Equipment Changed: HME  Vent Type: Servo i  Vent Mode: PRVC  Vt Ordered: 560 mL  Rate Set: (S) 14 bmp (Post ABG)  FiO2 : (S) 50 %  SpO2: 100 %  SpO2/FiO2 ratio: 222.22  Sensitivity: 1  PEEP/CPAP: 8  I Time/ I Time %: (S) 0.9 s  Humidification Source: HME  · Nitric Oxide/Epoprostenol In Use?: No   · Increasing right sided pleural effusion, moderate in size, no intervention at this time. GI/Nutrition  · Ulcer Prophylaxis: PPI   · Diet:Diet NPO   · IR for paracentesis, if >500 give albumin  · GI consult  · Bedside endoscopy done yesterday, no varices or acute bleeding seen  · ENT consult recommended  · No colonoscopy, repeat CT abdomen pelvis   Renal/Fluid/Electrolyte  I/O: In: 4716.5 [I.V.:1417.7;  Blood:335; NG/GT:2643]  · Out: 66 [Urine:66]  · Monitor electrolytes, replace PRN · Lasix gtt  ID  WBC:   Lab Results   Component Value Date    WBC 9.9 2022     Tmax: Temp (24hrs), Av.5 °F (36.4 °C), Min:95 °F (35 °C), Max:98.4 °F (36.9 °C)  · Unasyn for aspiration pneumonia   Hematology:  Recent Labs     22  1731 22  2324 22  0536   HGB 7.8* 7.3* 6.9*   ·   4 units PRBC  Endocrine:   glucose controlled - most recent BGL is   Recent Labs     22  0500 22  1731 22  0536   GLUCOSE 98 129* 151*     DVT Prophylaxis  · No chemoprophylaxis anticoagulation at this time. Discharge Needs:  PT, OT, ST, SW and Case Management      CODE STATUS: DNR-CCA      DISPOSITION:  [x] To remain ICU: Intubated  [] OK for out of ICU from [facility], MD  Emergency Medicine Resident  [address]  3/30/2022, 8:38 AM EDT  Attending Physician Statement  I have discussed the care of Arthea Boeck, including pertinent history and exam findings,  with the resident. I have seen and examined the patient and the key elements of all parts of the encounter have been performed by me. I agree with the assessment, plan and orders as documented by the resident with additions . Worsening renal fx   Start lasix gtt   Nephrology eval   Paracentesis today   Prognoses poor        Total critical care time caring for this patient with life threatening, unstable organ failure, including direct patient contact, management of life support systems, review of data including imaging and labs, discussions with other team members and physicians at least 27   Min so far today, excluding procedures. Treatment plan Discussed with nursing staff in detail , all questions answered . Electronically signed by Kiko Mckeon MD on   3/30/22 at 10:03 PM EDT    Please note that this chart was generated using voice recognition Dragon dictation software.   Although every effort was made to ensure the accuracy of this automated transcription, some errors in transcription may have occurred.

## 2022-03-30 NOTE — PROGRESS NOTES
[facility]   Progress Note                   Date:   3/30/2022  Patient name: Ade Melendez  Date of admission:  3/28/2022 10:29 AM  MRN:   [id number]  YOB: 1955  PCP: Nella Scruggs    Reason for Admission: Cardiac arrest [facility]) [I46.9]   Reason for Consult: PEA cardiac arrest    Subjective:         Patient seen and examined bedside  Echo unremarkable  Continues to be on dopamine  Hypokalemic and worsening ROMAN. Neuro critical following for neuro prognosis. I/O last 3 completed shifts: In: 7627.9 [I.V.:3485.9; Blood:630; NG/GT:2643; IV Piggyback:869]  Out: 208 [PGVSL:202]  I/O this shift: In: 721.2 [I.V.:172.1; Blood:495.8; NG/GT:40; IV Piggyback:13.2]  Out: 46 [Urine:51]      Brief History:   Ade Melendez is a 78 y.o. male who presented after a cardiac arrest with prolonged downtime of about 40 minutes. Patient did not receive any CPR by bystanders. Patient was given 4 rounds of epinephrine and 30 minutes of CPR prior to arrival, ROSC was achieved, patient became bradycardic and lost pulses again requiring another round of CPR with plan milligram epinephrine. Patient was subsequently started on epinephrine drip. Patient found to have bright red blood per rectum requiring colonoscopy, CT chest concerning for multifocal pneumonia, substernal hematoma, rib fractures secondary to CPR. Patient underwent EGD which was unremarkable, CT surgery consulted for retrosternal hematoma and recommended monitoring hemoglobin and CT chest.     Reason for Consult: Cardiac arrest and cardiogenic shock     Patient was hypothermic on arrival and was on epinephrine and dopamine drip, was weaned off of epinephrine drip and continued on dopamine drip. Patient currently on propofol and Versed for sedation. Labs at time of admission showed high anion gap metabolic acidosis with lactic acidosis 11.4, potassium 3.5, bicarb 10, anion gap 20. PT/INR 20/2. 0     · TSH pending  · Hemoglobin A1C pending  · Trops 12-42-  · Hemoglobin 5.6, required 1 unit of PRBC, hemoglobin 7.1 this morning. · Leukocytosis 15.1  · ProBNP 1512  · Echo during this admissiondone but pending      Review Of Systems:   Unable to assess    Physical Exam   Vitals: BP (!) 120/52   Pulse 92   Temp 97.9 °F (36.6 °C)   Resp 30   Ht 5' 9\" (1.753 m)   Wt 260 lb 9.3 oz (118.2 kg)   SpO2 95%   BMI 38.48 kg/m²     General:  Intubated and sedated  HEENT: Atraumatic, normocephalic, no throat congestion, moist mucosa. ET tube in situ  Chest:   Ventilated breath sounds  Cardiac:  S1 S2 RR, no gallops, murmur or rubs. Abdomen: Soft, non-tender, no masses or organomegaly, BS present. :   No suprapubic or flank tenderness. Neuro:  Unable to assess  SKIN:  No rashes, good skin turgor. Extremities:  No edema. Medications:   Scheduled Meds:   ampicillin-sulbactam  3,000 mg IntraVENous Q12H    insulin lispro  0-18 Units SubCUTAneous Q4H    pantoprazole (PROTONIX) 40 mg injection  40 mg IntraVENous Q12H    rifaximin  200 mg Per NG tube TID    levetiracetam  1,000 mg IntraVENous Q12H     Continuous Infusions:   sodium chloride      furosemide (LASIX) 1mg/ml infusion 20 mg/hr (03/30/22 1129)    sodium chloride 50 mL/hr at 03/30/22 1102    sodium chloride      dextrose      DOPamine 1 mcg/kg/min (03/30/22 0720)    fentaNYL Stopped (03/29/22 0532)       Labs:     CBC:   Recent Labs     03/28/22  1042 03/28/22  1955 03/29/22  0500 03/29/22  1045 03/29/22  1731 03/29/22  2324 03/30/22  0536   WBC 12.8*  --  15.1*  --   --   --  9.9   HGB 5.6*   < > 7.1*   < > 7.8* 7.3* 6.9*     --  157  --   --   --  92*    < > = values in this interval not displayed.      BMP:    Recent Labs     03/29/22  0500 03/29/22  1731 03/30/22  0536    131* 142   K 3.7 3.6* 3.1*    99 105   CO2 18* 22 24   BUN 18 23 29*   CREATININE 2.08* 2.72* 3.23*   GLUCOSE 98 129* 151*     Hepatic:   Recent Labs     03/28/22  1404 03/29/22  0500 03/30/22  0536   AST 88* 81* 85*   ALT 35 41 37   BILITOT 1.34* 2.08* 1.53*   ALKPHOS 99 82 63     Troponin: No results for input(s): TROPONINI in the last 72 hours. BNP: No results for input(s): BNP in the last 72 hours. Lipids: No results for input(s): CHOL, HDL in the last 72 hours. Invalid input(s): LDLCALCU  INR:   Recent Labs     03/28/22  1042 03/28/22  1404   INR 1.6 2.0       Imaging:   CT ABDOMEN PELVIS WO CONTRAST Additional Contrast? None    Result Date: 3/30/2022  1. Cirrhosis, ascites and anasarca. 2. No bowel obstruction identified. 3. Mild distention of the colon which could indicate an ileus. 4. Worsened bilateral pleural effusions and a worsened mild-to-moderate pericardial effusion. CT Head WO Contrast    Result Date: 3/28/2022  No acute intracranial abnormality. CT CHEST WO CONTRAST    Result Date: 3/29/2022  1. Interval decrease in size of substernal hematoma. 2.  Increasing right pleural effusion, now moderate in size. Blood products in this area appear extrapleural.  Similar appearance of low-density left pleural effusion. 3.  Redemonstration of sternal and bilateral rib fractures with overlying subcutaneous edema. 4.  Cirrhotic liver morphology and ascites. CTA CHEST W WO CONTRAST    Result Date: 3/28/2022  Acute traumatic nondisplaced to minimally displaced fractures of the right anterior 3rd, 4th, 7th and 8th ribs, left anterior 2nd, 3rd, 4th, 5th, 6th and 7th ribs as well as the right anterior 4th costochondral cartilage. Acute traumatic nondisplaced fracture of the body of the sternum. Substernal hematoma with small focus of active extravasation of contrast suggesting arterial bleeding. Hematomas in the subpleural space deep to the right costochondral cartilage fractures and anterior rib fractures with foci of active extravasation of contrast suggesting active bleeding from the right internal thoracic artery. Bilateral pleural effusions.  Right perihilar upper lobe and lower lobe ground-glass opacities suggesting pulmonary contusions. No pneumothorax. No acute traumatic injury of the thoracic aorta. Upper abdominal ascites. CT Cervical Spine WO Contrast    Result Date: 3/28/2022  No acute traumatic injury of the cervical spine. Mild multilevel degenerative changes. Pleural effusions in the lung apices. XR CHEST PORTABLE    Result Date: 3/28/2022  1. Endotracheal tube terminates 2 cm above the matthew. Enteric tube terminates at the body of the stomach. 2.  Bilateral airspace disease, which may represent inflammatory process or multifocal infection. CTA ABDOMEN PELVIS W CONTRAST    Result Date: 3/28/2022  No acute traumatic injury of the abdomen or pelvis. Findings of hepatic cirrhosis with abdominal and pelvic ascites possibly related to the cirrhosis. Mildly prominent loops of bowel throughout the abdomen and pelvis with mucosal enhancement. This may be secondary to mild ileus or shock bowel. No evidence of bowel obstruction. No acute traumatic injury of the aorta. No active extravasation of contrast. See separate CT of the chest for discussion of those findings. XR ABDOMEN FOR NG/OG/NE TUBE PLACEMENT    Result Date: 3/29/2022  Enteric tube looped within the body of the stomach with the tip directed superiorly towards the fundus of the stomach. XR ABDOMEN FOR NG/OG/NE TUBE PLACEMENT    Result Date: 3/29/2022  Enteric tube terminating proximal to the GE junction. This should be advanced approximately 10 cm, and reimaged. XR ABDOMEN FOR NG/OG/NE TUBE PLACEMENT    Result Date: 3/29/2022  No change in position of the tip of endotracheal tube. If this has been advanced., Question it may be coiled off the field of view of the radiograph. .  If warranted, consider chest x-ray or soft tissue neck imaging. Otherwise, consider advancement 10-15 cm.  The findings were sent to the Radiology Results [id number] at 1:09 am on 3/29/2022to be communicated to a licensed caregiver. XR ABDOMEN FOR NG/OG/NE TUBE PLACEMENT    Result Date: 3/28/2022  Enteric tube terminates the level of the GE junction. This needs advanced least 10 cm. The findings were sent to the Radiology Results [address] at 8:49 pm on 3/28/2022to be communicated to a licensed caregiver. Cardiac Testing:     EKG:as above    ECHO:as above    CATH: as above    STRESS: as above    Other Current Problems:   Patient Active Problem List:     Alcohol intoxication in alcoholism with blood level over 0.3 without complication ([address]     Cardiac arrest ([facility]     Acute blood loss anemia     Hematemesis     Alcoholic cirrhosis of liver with ascites ([facility]     Acute kidney injury (ROMAN) with acute tubular necrosis (ATN) (HCC)     Metabolic alkalosis     Metabolic acidosis      Impression:    PEA cardiac arrest with prolonged CPR   ? Cardiogenic shock   Acute hypoxic respiratory failure   Retrosternal hematoma   Cirrhosis with ascites   Alcohol use disorder   High anion gap metabolic acidosis   ROMAN   ? Aspiration pneumonia   Morbid obesity with BMI    Plan:     · Continue dopamine for pressor support, wean off as tolerated  · Echo showed EF 65%  · Keep hemoglobin greater than 7  · Keep potassium greater than 4 and magnesium greater than 2  · Ischemia work-up if meaningful neurological recovery. · Rest of the management per primary  · We will follow along  · Guarded prognosis due to prolonged downtime and CPR      Will discuss with attending physician. Recommendations above pending approval by attending physician. Sukhi Muro  Internal Medicine/ Cardiology Resident  [facility]  3/30/2022 12:52 PM          Attestation signed by      Attending Physician Statement:    I have discussed the care of  Addison Mckenzie , including pertinent history and exam findings, with the Cardiology fellow/resident.      I have seen and examined the patient and the key elements of all parts of the encounter have been performed by me. I agree with the assessment, plan and orders as documented by the fellow/resident, after I modified exam findings and plan of treatments, and the final version is my approved version of the assessment. Additional Comments:     No significant neurological recovery. Discussed with patient's brother and sister in detail. They are leaning towards palliative/comfort care.

## 2022-03-30 NOTE — PROGRESS NOTES
Pharmacy Note     Renal Dose Adjustment    Svetlana Jhaveri is a 78 y.o. male. Pharmacist assessment of renally cleared medications. Recent Labs     03/29/22  1731 03/30/22  0536   BUN 23 29*       Recent Labs     03/29/22  1731 03/30/22  0536   CREATININE 2.72* 3.23*       Estimated Creatinine Clearance: 29 mL/min (A) (based on SCr of 3.23 mg/dL (H)).   Estimated CrCl using Ideal Body Weight: 22 mL/min (based on IBW 70.7 kg)    Height:   Ht Readings from Last 1 Encounters:   03/29/22 5' 9\" (1.753 m)     Weight:  Wt Readings from Last 1 Encounters:   03/30/22 260 lb 9.3 oz (118.2 kg)       The following medication dose has been adjusted based upon renal function per P&T Guidelines:    Unasyn 3000mg IV Q6H to Q12H    Analisa Bray PharmD [facility]  3/30/2022 10:33 AM

## 2022-03-31 NOTE — PROGRESS NOTES
INTENSIVE CARE UNIT  Resident Physician Progress Note    Patient - Marianela Javed  Date of Admission -  3/28/2022 10:29 AM  Date of Evaluation -  3/31/2022  Room and Bed Number -  1106 [facility] 9 Day - 3  Chief Complaint   Patient presents with    Cardiac Arrest      SUBJECTIVE:     HISTORY OF PRESENT ILLNESS:    78-year-old male with a history of alcohol use, hypertension presented to the emergency room by EMS as a postarrest.  Patient had an unknown downtime and was found prone on the ground. No bystander CPR was done. EMS performed CPR for about 40 minutes, patient was initially found to be in PEA arrest.  An eye gel was used to establish airway. He was given 4 rounds of epinephrine during CPR and had ROSC on arrival.  Patient then became bradycardic and CPR is in seconds initiated, ROSC was achieved after 1 round and 1 mg of epi. Patient was started on epinephrine drip and intubated. Patient reportedly had hematemesis before intubation as well as brown/pink fluid return with his OG. He once again lost pulses and wanted to rest.  CPR was initiated and amp of bicarb was given and ROSC was obtained. Patient's hemoglobin was found to be 5.6. Imaging showed bilateral airspace disease, pleural effusions in the apices of the lungs, traumatic nondisplaced rib fractures, traumatic nondisplaced sternal fracture, substernal hematoma with active extravasation resting arterial bleed. 3/28: Epi drip was discontinued, dopamine drip started, sedation discontinued, arterial line placed in the right radial artery, patient placed on bicarb drip. Bedside EGD was done and showed active retropharyngeal bleeding, ENT was consulted. Octreotide drip, Protonix drip were discontinued. 3/29: 1 Unit PRBC overnight, No colonoscopy per GI instead repeat CT abdomen pelvis in AM    3/30:Patient given another unit of blood overnight. No acute events.     Consults: Neuro crit care, palliative care, CT surgery, ENT, GI    OVERNIGHT EVENTS:    No acute events overnight. MRI this AM for neuro prognostication. Family updated. OBJECTIVE:     VITAL SIGNS:  Patient Vitals for the past 8 hrs:   BP Temp Temp src Pulse Resp SpO2 Weight   22 0808     28 100 %    22 0800 (!) 122/50 98.4 °F (36.9 °C) Esophageal 75 10 100 %    22 0759    74 14 100 %    22 0645    75  100 %    22 0630    73  100 %    22 0615    71  100 %    22 0600  97.9 °F (36.6 °C) Esophageal 72  100 %    22 0545    72  100 %    22 0530    72  100 %    22 0515    69  100 %    22 0500    71  100 %    22 0445    71  100 %    22 0430    73  100 %    22 0415    71  99 %    22 0400  97.2 °F (36.2 °C) Esophageal 71  99 %    22 0348       256 lb 13.4 oz (116.5 kg)   22 0345    67  99 %    22 0343    67 14 99 %    22 0330    68  99 %    22 0315    67  100 %    22 0300    66  100 %    22 0245    66  100 %    22 0230    67  100 %    22 0215    68  100 %    22 0200  95.7 °F (35.4 °C) Esophageal 66 14 100 %    22 0145    66 12 100 %    22 0130    68 (!) 7 100 %    22 0115    64 12 100 %    22 0100    68 15 100 %    22 0045    67 28 100 %        Last Body weight:   Wt Readings from Last 3 Encounters:   22 256 lb 13.4 oz (116.5 kg)   17 180 lb (81.6 kg)       Body Mass Index : Body mass index is 37.93 kg/m².       Tmax over 24 hours: Temp (24hrs), Av.1 °F (36.7 °C), Min:95.7 °F (35.4 °C), Max:100.4 °F (38 °C)      Ins/Outs:   In: 2649.5 [I.V.:1609; Blood:495.8; NG/GT:40]  Out: 1311 [Urine:1311]    PHYSICAL EXAM:  Constitutional: Not following commands or responding to pain  EENT: Pinpoint pupils, sclera clear, anicteric, neck supple with midline trachea. Neck: Supple, symmetrical, trachea midline, no adenopathy, thyroid symmetric, no jvd skin normal  Respiratory: Mechanical breath sounds  Cardiovascular: regular rate and rhythm, normal S1, S2, no murmur noted and 2+ pulses throughout  Abdomen: soft, nontender, distended,   Extremities:  peripheral pulses normal, bilateral lower extremity edema. no clubbing or cyanosis    MEDICATIONS:  Scheduled Meds:   ampicillin-sulbactam  3,000 mg IntraVENous Q12H    insulin lispro  0-18 Units SubCUTAneous Q4H    pantoprazole (PROTONIX) 40 mg injection  40 mg IntraVENous Q12H    rifaximin  200 mg Per NG tube TID    levetiracetam  1,000 mg IntraVENous Q12H       Continuous Infusions:   sodium chloride      sodium chloride 50 mL/hr at 03/31/22 0718    furosemide (LASIX) 5mg/ml infusion 40 mg/hr (03/31/22 0818)    sodium chloride      dextrose      DOPamine 1 mcg/kg/min (03/31/22 0816)       PRN Meds:   sodium chloride, , PRN  fentanNYL, 50 mcg, Q1H PRN  sodium chloride, , PRN  ondansetron, 4 mg, Q8H PRN   Or  ondansetron, 4 mg, Q6H PRN  polyethylene glycol, 17 g, Daily PRN  glucose, 15 g, PRN  dextrose, 12.5 g, PRN  glucagon (rDNA), 1 mg, PRN  dextrose, 100 mL/hr, PRN        SUPPORT DEVICES: [x] Ventilator [] BIPAP  [] Nasal Cannula [] Room Air    VENT SETTINGS (Comprehensive) (if applicable):   PRVC mode, FiO2 45%, PEEP 8, Respiratory Rate 20, Tidal Volume 560  Vent Information  $Ventilation: $Subsequent Day  Skin Assessment: Clean, dry, & intact  Equipment ID: [id number]  Equipment Changed: HME  Vent Type: Servo i  Vent Mode: PRVC  Vt Ordered: 500 mL  Rate Set: 14 bmp  FiO2 : 40 %  SpO2: 100 %  SpO2/FiO2 ratio: 250  Sensitivity: 1  PEEP/CPAP: 5  I Time/ I Time %: 0.7 s  Humidification Source: HME  Nitric Oxide/Epoprostenol In Use?: No  Additional Respiratory  Assessments  Pulse: 75  Resp: 28  SpO2: 100 %  End Tidal CO2: 34 (%)  Position: Semi-Pennington's  Humidification Source: HME  Oral Care Completed?: Yes  Oral Care: Mouthwash,Lip moisturizer applied,Mouth swabbed,Mouth moisturizer,Mouth suctioned,Suction toothette  Subglottic Suction Done?: Yes  Cuff Pressure (cm H2O):  (MLt)  Lab Results   Component Value Date    MODE [facility] 03/30/2022       ABGs:   Arterial Blood Gas result:  pH 7.530; pCO2 26.8; pO2 61.4; HCO3 22.4; BE0; %O2 Sat 94. No results found for: PH, PCO2, PO2, HCO3, O2SAT    DATA:  Complete Blood Count:   Recent Labs     03/29/22  0500 03/29/22  1045 03/29/22  2324 03/30/22  0536 03/31/22  0422   WBC 15.1*  --   --  9.9 9.4   RBC 2.96*  --   --  2.73* 2.95*   HGB 7.1*   < > 7.3* 6.9* 7.6*   HCT 22.0*   < > 22.2* 20.4* 22.9*   MCV 74.3*  --   --  74.7* 77.6*   MCH 24.0*  --   --  25.3 25.8   MCHC 32.3  --   --  33.8 33.2   RDW 18.6*  --   --  19.9* 21.2*     --   --  92* 81*   MPV 10.7  --   --  10.3 10.5    < > = values in this interval not displayed.         Last 3 Blood Glucose:   Recent Labs     03/28/22  1042 03/28/22  1404 03/28/22  2303 03/29/22  0500 03/29/22  1731 03/30/22  0536 03/30/22  1831 03/31/22  0422   GLUCOSE 130* 195* 221* 98 129* 151* 110* 117*        PT/INR:    Lab Results   Component Value Date    PROTIME 20.0 03/28/2022    INR 2.0 03/28/2022     PTT:    Lab Results   Component Value Date    APTT 50.9 03/28/2022       Basic Metabolic Profile:   Recent Labs     03/30/22  0536 03/30/22  1831 03/31/22  0422    141 137   K 3.1* 3.6* 3.5*    103 100   CO2 24 24 24   BUN 29* 33* 34*   CREATININE 3.23* 3.96* 4.09*   GLUCOSE 151* 110* 117*   PHOS  --  3.7  --        Liver Function:  Recent Labs     03/31/22  0422   PROT 5.2*   LABALBU 2.3*   ALT 33   AST 92*   ALKPHOS 58   BILITOT 1.47*       Magnesium:   Lab Results   Component Value Date    MG 1.9 03/31/2022    MG 1.8 03/30/2022    MG 1.8 03/30/2022     Phosphorus:   Lab Results   Component Value Date    PHOS 3.7 03/30/2022     Ionized Calcium:   Lab Results   Component Value Date    CAION 0.99 03/31/2022    CAION 0.98 03/30/2022    CAION 1.05 03/29/2022        Urinalysis:   Lab Results   Component Value Date    NITRU NEGATIVE 03/28/2022    COLORU Dark Yellow 03/28/2022    PHUR 5.5 03/28/2022    WBCUA 10 TO 20 03/28/2022    RBCUA 2 TO 5 03/28/2022    SPECGRAV 1.022 03/28/2022    LEUKOCYTESUR TRACE 03/28/2022    UROBILINOGEN Normal 03/28/2022    BILIRUBINUR NEGATIVE 03/28/2022    GLUCOSEU NEGATIVE 03/28/2022    KETUA NEGATIVE 03/28/2022       HgBA1c:  No results found for: LABA1C  TSH:  No results found for: TSH  Lactic Acid: No results found for: LACTA   Troponin: No results for input(s): TROPONINI in the last 72 hours. Other Labs:  Results for orders placed or performed during the hospital encounter of 03/28/22   Culture, Urine    Specimen: Urine, straight catheter   Result Value Ref Range    Specimen Description . URINE,STRAIGHT CATHETER     Culture ENTEROCOCCUS FAECALIS >410590 CFU/ML (A)        Susceptibility    Enterococcus  faecalis - BACTERIAL SUSCEPTIBILITY PANEL CARL     ampicillin <=2 Sensitive      ciprofloxacin <=0.5 Sensitive      levofloxacin 1 Sensitive      nitrofurantoin <=16 Sensitive      tetracycline >=16 Resistant      vancomycin 1 Sensitive    COVID-19, Rapid    Specimen: Nasopharyngeal Swab   Result Value Ref Range    Specimen Description . NASOPHARYNGEAL SWAB     SARS-CoV-2, Rapid Not Detected Not Detected   Culture, Respiratory    Specimen: Sputum, Suctioned   Result Value Ref Range    Specimen Description . SUCTIONED SPUTUM     Direct Exam < 10 EPITHELIAL CELLS/LPF     Direct Exam >10, <25 NEUTROPHILS/LPF     Direct Exam (A)      PREDOMINANT ORGANISM: GRAM POSITIVE COCCI IN CLUSTERS    Culture NORMAL RESPIRATORY ANNAMARIA MODERATE GROWTH    MRSA DNA Probe, Nasal    Specimen: Nasal   Result Value Ref Range    Specimen Description . NASAL SWAB     MRSA, DNA, Nasal NEGATIVE NEGATIVE   Culture, Body Fluid    Specimen: Ascitic Fluid; Body Fluid   Result Value Ref Range    Specimen Description . ASCITIC FLUID Direct Exam MANY NEUTROPHILS (A)     Direct Exam NO ORGANISMS SEEN     Direct Exam       Gram stain made from cytocentrifuged specimen. Organisms and cells will be concentrated.     Culture NO GROWTH 15 HOURS    CBC with Auto Differential   Result Value Ref Range    WBC 12.8 (H) 3.5 - 11.3 k/uL    RBC 2.61 (L) 4.21 - 5.77 m/uL    Hemoglobin 5.6 (LL) 13.0 - 17.0 g/dL    Hematocrit 20.0 (L) 40.7 - 50.3 %    MCV 76.6 (L) 82.6 - 102.9 fL    MCH 21.5 (L) 25.2 - 33.5 pg    MCHC 28.0 (L) 28.4 - 34.8 g/dL    RDW 17.9 (H) 11.8 - 14.4 %    Platelets 166 390 - 543 k/uL    MPV 11.2 8.1 - 13.5 fL    NRBC Automated 0.8 (H) 0.0 per 100 WBC    Immature Granulocytes 4 (H) 0 %    Seg Neutrophils 52 36 - 66 %    Lymphocytes 35 24 - 44 %    Monocytes 7 1 - 7 %    Eosinophils % 1 1 - 4 %    Basophils 1 0 - 2 %    nRBC 1 (H) 0 per 100 WBC    Absolute Immature Granulocyte 0.51 (H) 0.00 - 0.30 k/uL    Segs Absolute 6.65 1.8 - 7.7 k/uL    Absolute Lymph # 4.48 1.0 - 4.8 k/uL    Absolute Mono # 0.90 (H) 0.1 - 0.8 k/uL    Absolute Eos # 0.13 0.0 - 0.4 k/uL    Basophils Absolute 0.13 0.0 - 0.2 k/uL    Morphology ANISOCYTOSIS PRESENT     Morphology MICROCYTOSIS PRESENT     Morphology HYPOCHROMIA PRESENT     Morphology 1+ POLYCHROMASIA    Comprehensive Metabolic Panel w/ Reflex to MG   Result Value Ref Range    Glucose 130 (H) 70 - 99 mg/dL    BUN 12 8 - 23 mg/dL    CREATININE 0.96 0.70 - 1.20 mg/dL    Calcium 8.0 (L) 8.6 - 10.4 mg/dL    Sodium 136 135 - 144 mmol/L    Potassium 4.1 3.7 - 5.3 mmol/L    Chloride 108 (H) 98 - 107 mmol/L    CO2 12 (L) 20 - 31 mmol/L    Anion Gap 16 9 - 17 mmol/L    Alkaline Phosphatase 101 40 - 129 U/L    ALT 16 5 - 41 U/L    AST 38 <40 U/L    Total Bilirubin 0.89 0.3 - 1.2 mg/dL    Total Protein 6.0 (L) 6.4 - 8.3 g/dL    Albumin 2.6 (L) 3.5 - 5.2 g/dL    Albumin/Globulin Ratio 0.8 (L) 1.0 - 2.5    GFR Non-African American >60 >60 mL/min    GFR African American >60 >60 mL/min    GFR Comment         Troponin   Result Value Ref Range    Troponin, High Sensitivity 12 0 - 22 ng/L   Brain Natriuretic Peptide   Result Value Ref Range    Pro-BNP 1,512 (H) <300 pg/mL   Protime-INR   Result Value Ref Range    Protime 16.3 (H) 9.1 - 12.3 sec    INR 1.6    APTT   Result Value Ref Range    PTT 81.1 (H) 20.5 - 30.5 sec   Lactic Acid   Result Value Ref Range    Lactic Acid, Whole Blood 11.2 (H) 0.7 - 2.1 mmol/L   Urinalysis with Microscopic   Result Value Ref Range    Color, UA Dark Yellow (A) Yellow    Turbidity UA Cloudy (A) Clear    Glucose, Ur NEGATIVE NEGATIVE    Bilirubin Urine NEGATIVE NEGATIVE    Ketones, Urine NEGATIVE NEGATIVE    Specific Gravity, UA 1.022 1.005 - 1.030    Urine Hgb MODERATE (A) NEGATIVE    pH, UA 5.5 5.0 - 8.0    Protein, UA 2+ (A) NEGATIVE    Urobilinogen, Urine Normal Normal    Nitrite, Urine NEGATIVE NEGATIVE    Leukocyte Esterase, Urine TRACE (A) NEGATIVE    -          WBC, UA 10 TO 20 0 - 5 /HPF    RBC, UA 2 TO 5 0 - 4 /HPF    Casts UA  0 - 8 /LPF     20 TO 50 Reference range defined for non-centrifuged specimen.     Epithelial Cells UA 10 TO 20 0 - 5 /HPF   Blood Gas, Venous   Result Value Ref Range    pH, Jez 6.850 (LL) 7.320 - 7.420    pCO2, Jez 78.2 (H) 39 - 55    pO2, Jez 92.2 (H) 30 - 50    HCO3, Venous 12.9 (L) 24 - 30 mmol/L    Negative Base Excess, Jez 19.0 (H) 0.0 - 2.0 mmol/L    O2 Sat, Jez 85.9 (H) 60.0 - 85.0 %    Carboxyhemoglobin 2.3 0 - 5 %    Pt Temp 37.0     FIO2 INFORMATION NOT PROVIDED    ELECTROLYTES PLUS   Result Value Ref Range    POC Sodium 141 138 - 146 mmol/L    POC Potassium 4.1 3.5 - 4.5 mmol/L    POC Chloride 108 (H) 98 - 107 mmol/L    POC TCO2 17 (L) 22 - 30 mmol/L    Anion Gap 17 (H) 7 - 16 mmol/L   Hemoglobin and hematocrit, blood   Result Value Ref Range    POC Hemoglobin 7.7 (L) 13.5 - 17.5 g/dL    POC Hematocrit 23 (L) 41 - 53 %   CALCIUM, IONIC (POC)   Result Value Ref Range    POC Ionized Calcium 1.22 1.15 - 1.33 mmol/L   TOX SCR, BLD, ED   Result Value Ref Range Acetaminophen Level <5 (L) 10 - 30 ug/mL    Ethanol <10 <10 mg/dL    Ethanol percent <7.874 <9.892 %    Salicylate Lvl <1 (L) 3 - 10 mg/dL    Toxic Tricyclic Sc,Blood NEGATIVE NEGATIVE   ELECTROLYTES PLUS   Result Value Ref Range    POC Sodium 141 138 - 146 mmol/L    POC Potassium 3.6 3.5 - 4.5 mmol/L    POC Chloride 106 98 - 107 mmol/L    POC TCO2 15 (L) 22 - 30 mmol/L    Anion Gap 21 (H) 7 - 16 mmol/L   Hemoglobin and hematocrit, blood   Result Value Ref Range    POC Hemoglobin 7.2 (L) 13.5 - 17.5 g/dL    POC Hematocrit 21 (L) 41 - 53 %   CALCIUM, IONIC (POC)   Result Value Ref Range    POC Ionized Calcium 1.14 (L) 1.15 - 1.33 mmol/L   Basic Metabolic Panel w/ Reflex to MG   Result Value Ref Range    Glucose 195 (H) 70 - 99 mg/dL    BUN 13 8 - 23 mg/dL    CREATININE 1.12 0.70 - 1.20 mg/dL    Calcium 7.5 (L) 8.6 - 10.4 mg/dL    Sodium 134 (L) 135 - 144 mmol/L    Potassium 3.5 (L) 3.7 - 5.3 mmol/L    Chloride 104 98 - 107 mmol/L    CO2 10 (L) 20 - 31 mmol/L    Anion Gap 20 (H) 9 - 17 mmol/L    GFR Non-African American >60 >60 mL/min    GFR African American >60 >60 mL/min    GFR Comment         Hepatic function panel   Result Value Ref Range    Albumin 2.5 (L) 3.5 - 5.2 g/dL    Alkaline Phosphatase 99 40 - 129 U/L    ALT 35 5 - 41 U/L    AST 88 (H) <40 U/L    Total Bilirubin 1.34 (H) 0.3 - 1.2 mg/dL    Bilirubin, Direct 0.87 (H) <0.31 mg/dL    Bilirubin, Indirect 0.47 0.00 - 1.00 mg/dL    Total Protein 5.7 (L) 6.4 - 8.3 g/dL    Albumin/Globulin Ratio 0.8 (L) 1.0 - 2.5   Calcium, Ionized   Result Value Ref Range    Calcium, Ion 1.01 (L) 1.13 - 1.33 mmol/L   Troponin   Result Value Ref Range    Troponin, High Sensitivity 42 (H) 0 - 22 ng/L   Protime-INR   Result Value Ref Range    Protime 20.0 (H) 9.1 - 12.3 sec    INR 2.0    APTT   Result Value Ref Range    PTT 50.9 (H) 20.5 - 30.5 sec   Ammonia   Result Value Ref Range    Ammonia 64 (H) 16 - 60 umol/L   Magnesium   Result Value Ref Range    Magnesium 2.2 1.6 - 2.6 mg/dL   Ammonia   Result Value Ref Range    Ammonia 65 (H) 16 - 60 umol/L   Basic Metabolic Panel w/ Reflex to MG   Result Value Ref Range    Glucose 98 70 - 99 mg/dL    BUN 18 8 - 23 mg/dL    CREATININE 2.08 (H) 0.70 - 1.20 mg/dL    Calcium 7.3 (L) 8.6 - 10.4 mg/dL    Sodium 138 135 - 144 mmol/L    Potassium 3.7 3.7 - 5.3 mmol/L    Chloride 104 98 - 107 mmol/L    CO2 18 (L) 20 - 31 mmol/L    Anion Gap 16 9 - 17 mmol/L    GFR Non-African American 32 (L) >60 mL/min    GFR  39 (L) >60 mL/min    GFR Comment         Calcium, Ionized   Result Value Ref Range    Calcium, Ion 1.05 (L) 1.13 - 1.33 mmol/L   CBC with Auto Differential   Result Value Ref Range    WBC 15.1 (H) 3.5 - 11.3 k/uL    RBC 2.96 (L) 4.21 - 5.77 m/uL    Hemoglobin 7.1 (L) 13.0 - 17.0 g/dL    Hematocrit 22.0 (L) 40.7 - 50.3 %    MCV 74.3 (L) 82.6 - 102.9 fL    MCH 24.0 (L) 25.2 - 33.5 pg    MCHC 32.3 28.4 - 34.8 g/dL    RDW 18.6 (H) 11.8 - 14.4 %    Platelets 689 760 - 399 k/uL    MPV 10.7 8.1 - 13.5 fL    NRBC Automated 0.1 (H) 0.0 per 100 WBC    Immature Granulocytes 0 0 %    Seg Neutrophils 83 (H) 36 - 66 %    Lymphocytes 10 (L) 24 - 44 %    Monocytes 7 1 - 7 %    Eosinophils % 0 (L) 1 - 4 %    Basophils 0 0 - 2 %    Absolute Immature Granulocyte 0.00 0.00 - 0.30 k/uL    Segs Absolute 12.53 (H) 1.8 - 7.7 k/uL    Absolute Lymph # 1.51 1.0 - 4.8 k/uL    Absolute Mono # 1.06 (H) 0.1 - 0.8 k/uL    Absolute Eos # 0.00 0.0 - 0.4 k/uL    Basophils Absolute 0.00 0.0 - 0.2 k/uL    Morphology ANISOCYTOSIS PRESENT     Morphology MICROCYTOSIS PRESENT     Morphology 1+ POLYCHROMASIA    Hemoglobin and Hematocrit   Result Value Ref Range    Hemoglobin 6.6 (LL) 13.0 - 17.0 g/dL    Hematocrit 22.1 (L) 40.7 - 50.3 %   Hepatic function panel   Result Value Ref Range    Albumin 2.5 (L) 3.5 - 5.2 g/dL    Alkaline Phosphatase 82 40 - 129 U/L    ALT 41 5 - 41 U/L    AST 81 (H) <40 U/L    Total Bilirubin 2.08 (H) 0.3 - 1.2 mg/dL    Bilirubin, Direct 0.78 (H) <0.31 mg/dL    Bilirubin, Indirect 1.30 (H) 0.00 - 1.00 mg/dL    Total Protein 5.7 (L) 6.4 - 8.3 g/dL    Albumin/Globulin Ratio 0.8 (L) 1.0 - 2.5   Lactic Acid   Result Value Ref Range    Lactic Acid, Whole Blood 11.4 (H) 0.7 - 2.1 mmol/L   Lactic Acid   Result Value Ref Range    Lactic Acid, Whole Blood 5.3 (H) 0.7 - 2.1 mmol/L   Troponin   Result Value Ref Range    Troponin, High Sensitivity 83 (HH) 0 - 22 ng/L   Troponin   Result Value Ref Range    Troponin, High Sensitivity 94 (HH) 0 - 22 ng/L   Lactic Acid   Result Value Ref Range    Lactic Acid, Whole Blood 3.7 (H) 0.7 - 2.1 mmol/L   Hemoglobin and Hematocrit   Result Value Ref Range    Hemoglobin 7.4 (L) 13.0 - 17.0 g/dL    Hematocrit 23.6 (L) 40.7 - 50.3 %   Hemoglobin and Hematocrit   Result Value Ref Range    Hemoglobin 8.3 (L) 13.0 - 17.0 g/dL    Hematocrit 24.9 (L) 40.7 - 50.3 %   Basic Metabolic Panel   Result Value Ref Range    Glucose 221 (H) 70 - 99 mg/dL    BUN 15 8 - 23 mg/dL    CREATININE 1.62 (H) 0.70 - 1.20 mg/dL    Calcium 7.3 (L) 8.6 - 10.4 mg/dL    Sodium 134 (L) 135 - 144 mmol/L    Potassium 3.7 3.7 - 5.3 mmol/L    Chloride 101 98 - 107 mmol/L    CO2 14 (L) 20 - 31 mmol/L    Anion Gap 19 (H) 9 - 17 mmol/L    GFR Non-African American 43 (L) >60 mL/min    GFR  52 (L) >60 mL/min    GFR Comment         Lactic Acid   Result Value Ref Range    Lactic Acid, Whole Blood 2.3 (H) 0.7 - 2.1 mmol/L   Hemoglobin and Hematocrit   Result Value Ref Range    Hemoglobin 7.8 (L) 13.0 - 17.0 g/dL    Hematocrit 23.3 (L) 40.7 - 50.3 %   Troponin   Result Value Ref Range    Troponin, High Sensitivity 108 (HH) 0 - 22 ng/L   Lactic Acid   Result Value Ref Range    Lactic Acid, Whole Blood 2.2 (H) 0.7 - 2.1 mmol/L   Basic Metabolic Panel   Result Value Ref Range    Glucose 129 (H) 70 - 99 mg/dL    BUN 23 8 - 23 mg/dL    CREATININE 2.72 (H) 0.70 - 1.20 mg/dL    Calcium 7.2 (L) 8.6 - 10.4 mg/dL    Sodium 131 (L) 135 - 144 mmol/L    Potassium 3.6 (L) 3.7 - 5.3 mmol/L    Chloride 99 98 - 107 mmol/L    CO2 22 20 - 31 mmol/L    Anion Gap 10 9 - 17 mmol/L    GFR Non-African American 24 (L) >60 mL/min    GFR  29 (L) >60 mL/min    GFR Comment         Hemoglobin and Hematocrit   Result Value Ref Range    Hemoglobin 7.3 (L) 13.0 - 17.0 g/dL    Hematocrit 22.2 (L) 40.7 - 50.3 %   Basic Metabolic Panel w/ Reflex to MG   Result Value Ref Range    Glucose 151 (H) 70 - 99 mg/dL    BUN 29 (H) 8 - 23 mg/dL    CREATININE 3.23 (H) 0.70 - 1.20 mg/dL    Calcium 7.0 (L) 8.6 - 10.4 mg/dL    Sodium 142 135 - 144 mmol/L    Potassium 3.1 (L) 3.7 - 5.3 mmol/L    Chloride 105 98 - 107 mmol/L    CO2 24 20 - 31 mmol/L    Anion Gap 13 9 - 17 mmol/L    GFR Non-African American 19 (L) >60 mL/min    GFR  23 (L) >60 mL/min    GFR Comment         Calcium, Ionized   Result Value Ref Range    Calcium, Ion 0.98 (L) 1.13 - 1.33 mmol/L   CBC with Auto Differential   Result Value Ref Range    WBC 9.9 3.5 - 11.3 k/uL    RBC 2.73 (L) 4.21 - 5.77 m/uL    Hemoglobin 6.9 (LL) 13.0 - 17.0 g/dL    Hematocrit 20.4 (L) 40.7 - 50.3 %    MCV 74.7 (L) 82.6 - 102.9 fL    MCH 25.3 25.2 - 33.5 pg    MCHC 33.8 28.4 - 34.8 g/dL    RDW 19.9 (H) 11.8 - 14.4 %    Platelets 92 (L) 926 - 453 k/uL    MPV 10.3 8.1 - 13.5 fL    NRBC Automated 0.0 0.0 per 100 WBC    RBC Morphology ANISOCYTOSIS PRESENT     Seg Neutrophils 80 (H) 36 - 65 %    Lymphocytes 10 (L) 24 - 43 %    Monocytes 9 3 - 12 %    Eosinophils % 0 (L) 1 - 4 %    Basophils 0 0 - 2 %    Immature Granulocytes 0 0 %    Segs Absolute 7.95 1.50 - 8.10 k/uL    Absolute Lymph # 1.03 (L) 1.10 - 3.70 k/uL    Absolute Mono # 0.86 0.10 - 1.20 k/uL    Absolute Eos # <0.03 0.00 - 0.44 k/uL    Basophils Absolute <0.03 0.00 - 0.20 k/uL    Absolute Immature Granulocyte 0.04 0.00 - 0.30 k/uL   Hepatic function panel   Result Value Ref Range    Albumin 2.1 (L) 3.5 - 5.2 g/dL    Alkaline Phosphatase 63 40 - 129 U/L    ALT 37 5 - 41 U/L    AST 85 (H) <40 U/L    Total Bilirubin 1.53 (H) 0.3 - 1.2 mg/dL    Bilirubin, Direct 0.93 (H) <0.31 mg/dL    Bilirubin, Indirect 0.60 0.00 - 1.00 mg/dL    Total Protein 5.1 (L) 6.4 - 8.3 g/dL    Albumin/Globulin Ratio 0.7 (L) 1.0 - 2.5   Lactic Acid   Result Value Ref Range    Lactic Acid, Whole Blood 2.0 0.7 - 2.1 mmol/L   Magnesium   Result Value Ref Range    Magnesium 1.8 1.6 - 2.6 mg/dL   Albumin, Body Fluid   Result Value Ref Range    Specimen Type . ASCITIC FLUID     Albumin, Fluid 0.8 g/dL   Protein, Body Fluid   Result Value Ref Range    Specimen Type . ASCITIC FLUID     Total Protein, Body Fluid 1.3 g/dL   Lactate Dehydrogenase   Result Value Ref Range     (H) 135 - 225 U/L   Triglyceride   Result Value Ref Range    Triglycerides 50 <150 mg/dL   Magnesium   Result Value Ref Range    Magnesium 1.8 1.6 - 2.6 mg/dL   Phosphorus   Result Value Ref Range    Phosphorus 3.7 2.5 - 4.5 mg/dL   Basic Metabolic Panel   Result Value Ref Range    Glucose 110 (H) 70 - 99 mg/dL    BUN 33 (H) 8 - 23 mg/dL    CREATININE 3.96 (H) 0.70 - 1.20 mg/dL    Calcium 7.2 (L) 8.6 - 10.4 mg/dL    Sodium 141 135 - 144 mmol/L    Potassium 3.6 (L) 3.7 - 5.3 mmol/L    Chloride 103 98 - 107 mmol/L    CO2 24 20 - 31 mmol/L    Anion Gap 14 9 - 17 mmol/L    GFR Non-African American 15 (L) >60 mL/min    GFR  19 (L) >60 mL/min    GFR Comment         Basic Metabolic Panel w/ Reflex to MG   Result Value Ref Range    Glucose 117 (H) 70 - 99 mg/dL    BUN 34 (H) 8 - 23 mg/dL    CREATININE 4.09 (H) 0.70 - 1.20 mg/dL    Calcium 7.2 (L) 8.6 - 10.4 mg/dL    Sodium 137 135 - 144 mmol/L    Potassium 3.5 (L) 3.7 - 5.3 mmol/L    Chloride 100 98 - 107 mmol/L    CO2 24 20 - 31 mmol/L    Anion Gap 13 9 - 17 mmol/L    GFR Non-African American 15 (L) >60 mL/min    GFR  18 (L) >60 mL/min    GFR Comment         Calcium, Ionized   Result Value Ref Range    Calcium, Ion 0.99 (L) 1.13 - 1.33 mmol/L   CBC with Auto Differential   Result Value Ref Range    WBC 9.4 3.5 - 11.3 k/uL    RBC 2.95 (L) 4.21 - 5.77 m/uL    Hemoglobin 7.6 (L) 13.0 - 17.0 g/dL    Hematocrit 22.9 (L) 40.7 - 50.3 %    MCV 77.6 (L) 82.6 - 102.9 fL    MCH 25.8 25.2 - 33.5 pg    MCHC 33.2 28.4 - 34.8 g/dL    RDW 21.2 (H) 11.8 - 14.4 %    Platelets 81 (L) 238 - 453 k/uL    MPV 10.5 8.1 - 13.5 fL    NRBC Automated 0.0 0.0 per 100 WBC    Immature Granulocytes 0 0 %    Seg Neutrophils 72 (H) 36 - 66 %    Lymphocytes 15 (L) 24 - 44 %    Monocytes 11 (H) 1 - 7 %    Eosinophils % 2 1 - 4 %    Basophils 0 0 - 2 %    Absolute Immature Granulocyte 0.00 0.00 - 0.30 k/uL    Segs Absolute 6.77 1.8 - 7.7 k/uL    Absolute Lymph # 1.41 1.0 - 4.8 k/uL    Absolute Mono # 1.03 (H) 0.1 - 0.8 k/uL    Absolute Eos # 0.19 0.0 - 0.4 k/uL    Basophils Absolute 0.00 0.0 - 0.2 k/uL    Morphology ANISOCYTOSIS PRESENT     Morphology MICROCYTOSIS PRESENT     Morphology 1+ POLYCHROMASIA    Hepatic function panel   Result Value Ref Range    Albumin 2.3 (L) 3.5 - 5.2 g/dL    Alkaline Phosphatase 58 40 - 129 U/L    ALT 33 5 - 41 U/L    AST 92 (H) <40 U/L    Total Bilirubin 1.47 (H) 0.3 - 1.2 mg/dL    Bilirubin, Direct 0.89 (H) <0.31 mg/dL    Bilirubin, Indirect 0.58 0.00 - 1.00 mg/dL    Total Protein 5.2 (L) 6.4 - 8.3 g/dL    Albumin/Globulin Ratio 0.8 (L) 1.0 - 2.5   Magnesium   Result Value Ref Range    Magnesium 1.9 1.6 - 2.6 mg/dL   Venous Blood Gas, POC   Result Value Ref Range    pH, Jez 6.772 (LL) 7.320 - 7.430    pCO2, Jez 96.4 (HH) 41.0 - 51.0 mm Hg    pO2, Jez 40.4 30.0 - 50.0 mm Hg    HCO3, Venous 14.1 (L) 22.0 - 29.0 mmol/L    Negative Base Excess, Jez 20 (H) 0.0 - 2.0    O2 Sat, Jez 34 (L) 60.0 - 85.0 %   Creatinine W/GFR Point of Care   Result Value Ref Range    POC Creatinine 1.22 (H) 0.51 - 1.19 mg/dL    GFR Comment >60 >60 mL/min    GFR Non- 59 (L) >60 mL/min    GFR Comment         POCT urea (BUN)   Result Value Ref Range    POC BUN 12 8 - 26 mg/dL   Lactic Acid, POC   Result Value Ref Range POC Lactic Acid 10.97 (H) 0.56 - 1.39 mmol/L   POCT Glucose   Result Value Ref Range    POC Glucose 130 (H) 74 - 100 mg/dL   Arterial Blood Gas, POC   Result Value Ref Range    POC pH 7.098 (LL) 7.350 - 7.450    POC pCO2 44.3 35.0 - 48.0 mm Hg    POC PO2 71.5 (L) 83.0 - 108.0 mm Hg    POC HCO3 13.7 (L) 21.0 - 28.0 mmol/L    Negative Base Excess, Art 15 (H) 0.0 - 2.0    POC O2 SAT 87 (L) 94.0 - 98.0 %    O2 Device/Flow/% Adult Ventilator     Sample Site Right Radial Artery     FIO2 100.0    Creatinine W/GFR Point of Care   Result Value Ref Range    POC Creatinine 1.19 0.51 - 1.19 mg/dL    GFR Comment >60 >60 mL/min    GFR Non-African American >60 >60 mL/min    GFR Comment         POCT urea (BUN)   Result Value Ref Range    POC BUN 11 8 - 26 mg/dL   Lactic Acid, POC   Result Value Ref Range    POC Lactic Acid 11.73 (H) 0.56 - 1.39 mmol/L   POCT Glucose   Result Value Ref Range    POC Glucose 131 (H) 74 - 100 mg/dL   POC Glucose Fingerstick   Result Value Ref Range    POC Glucose 180 (H) 75 - 110 mg/dL   POC Glucose Fingerstick   Result Value Ref Range    POC Glucose 208 (H) 75 - 110 mg/dL   Arterial Blood Gas, POC   Result Value Ref Range    POC pH 7.304 (L) 7.350 - 7.450    POC pCO2 25.1 (L) 35.0 - 48.0 mm Hg    POC PO2 376.2 (H) 83.0 - 108.0 mm Hg    POC HCO3 12.5 (L) 21.0 - 28.0 mmol/L    Negative Base Excess, Art 13 (H) 0.0 - 2.0    POC O2  (H) 94.0 - 98.0 %    Nagi Test NOT APPLICABLE     Sample Site Arterial Line     FIO2 100.0     Pt Temp 34.5     POC pH Temp 7.34     POC pCO2 Temp 23 mm Hg    POC pO2 Temp 363 mm Hg   Arterial Blood Gas, POC   Result Value Ref Range    POC pH 7.310 (L) 7.350 - 7.450    POC pCO2 26.6 (L) 35.0 - 48.0 mm Hg    POC PO2 95.2 83.0 - 108.0 mm Hg    POC HCO3 13.4 (L) 21.0 - 28.0 mmol/L    Negative Base Excess, Art 12 (H) 0.0 - 2.0    POC O2 SAT 97 94.0 - 98.0 %    O2 Device/Flow/% Adult Ventilator     Nagi Test NOT APPLICABLE     Sample Site Arterial Line     Mode PRVC FIO2 50.0     Pt Temp 35.4     POC pH Temp 7.33     POC pCO2 Temp 25 mm Hg    POC pO2 Temp 86 mm Hg   Lactic Acid, POC   Result Value Ref Range    POC Lactic Acid 10.45 (H) 0.56 - 1.39 mmol/L   POCT Glucose   Result Value Ref Range    POC Glucose 238 (H) 74 - 100 mg/dL   POC Glucose Fingerstick   Result Value Ref Range    POC Glucose 174 (H) 75 - 110 mg/dL   Arterial Blood Gas, POC   Result Value Ref Range    POC pH 7.406 7.350 - 7.450    POC pCO2 28.9 (L) 35.0 - 48.0 mm Hg    POC PO2 93.7 83.0 - 108.0 mm Hg    POC HCO3 18.2 (L) 21.0 - 28.0 mmol/L    Negative Base Excess, Art 6 (H) 0.0 - 2.0    POC O2 SAT 98 94.0 - 98.0 %    O2 Device/Flow/% Adult Ventilator     Nagi Test NOT APPLICABLE     Sample Site Arterial Line     Mode PRVC     FIO2 50.0     Pt Temp 38.0     POC pH Temp 7.39     POC pCO2 Temp 30 mm Hg    POC pO2 Temp 100 mm Hg   Lactic Acid, POC   Result Value Ref Range    POC Lactic Acid 6.10 (H) 0.56 - 1.39 mmol/L   POCT Glucose   Result Value Ref Range    POC Glucose 200 (H) 74 - 100 mg/dL   POC Glucose Fingerstick   Result Value Ref Range    POC Glucose 103 75 - 110 mg/dL   Arterial Blood Gas, POC   Result Value Ref Range    POC pH 7.416 7.350 - 7.450    POC pCO2 33.1 (L) 35.0 - 48.0 mm Hg    POC PO2 74.2 (L) 83.0 - 108.0 mm Hg    POC HCO3 21.3 21.0 - 28.0 mmol/L    Negative Base Excess, Art 3 (H) 0.0 - 2.0    POC O2 SAT 95 94.0 - 98.0 %    O2 Device/Flow/% Adult Ventilator     Nagi Test NOT APPLICABLE     Sample Site Arterial Line     Mode PRVC     FIO2 50.0     Pt Temp 38.3     POC pH Temp 7.40     POC pCO2 Temp 35 mm Hg    POC pO2 Temp 81 mm Hg   POC Glucose Fingerstick   Result Value Ref Range    POC Glucose 82 75 - 110 mg/dL   POC Glucose Fingerstick   Result Value Ref Range    POC Glucose 89 75 - 110 mg/dL   POC Glucose Fingerstick   Result Value Ref Range    POC Glucose 124 (H) 75 - 110 mg/dL   POC Glucose Fingerstick   Result Value Ref Range    POC Glucose 119 (H) 75 - 110 mg/dL   POC Glucose Fingerstick   Result Value Ref Range    POC Glucose 133 (H) 75 - 110 mg/dL   POC Glucose Fingerstick   Result Value Ref Range    POC Glucose 116 (H) 75 - 110 mg/dL   Arterial Blood Gas, POC   Result Value Ref Range    POC pH 7.530 (H) 7.350 - 7.450    POC pCO2 26.8 (L) 35.0 - 48.0 mm Hg    POC PO2 61.4 (L) 83.0 - 108.0 mm Hg    POC HCO3 22.4 21.0 - 28.0 mmol/L    Positive Base Excess, Art 0 0.0 - 3.0    POC O2 SAT 94 94.0 - 98.0 %    Sample Site Arterial Line     Mode PRVC     FIO2 45.0     Pt Temp 35.0     POC pH Temp 7.56     POC pCO2 Temp 25 mm Hg    POC pO2 Temp 54 mm Hg   POCT Glucose   Result Value Ref Range    POC Glucose 143 (H) 74 - 100 mg/dL   POC Glucose Fingerstick   Result Value Ref Range    POC Glucose 127 (H) 75 - 110 mg/dL   POC Glucose Fingerstick   Result Value Ref Range    POC Glucose 106 75 - 110 mg/dL   POC Glucose Fingerstick   Result Value Ref Range    POC Glucose 93 75 - 110 mg/dL   POC Glucose Fingerstick   Result Value Ref Range    POC Glucose 100 75 - 110 mg/dL   POC Glucose Fingerstick   Result Value Ref Range    POC Glucose 113 (H) 75 - 110 mg/dL   Arterial Blood Gas, POC   Result Value Ref Range    POC pH 7.415 7.350 - 7.450    POC pCO2 39.8 35.0 - 48.0 mm Hg    POC PO2 72.7 (L) 83.0 - 108.0 mm Hg    POC HCO3 25.5 21.0 - 28.0 mmol/L    Positive Base Excess, Art 1 0.0 - 3.0    POC O2 SAT 95 94.0 - 98.0 %    Sample Site Arterial Line     FIO2 60.0     Pt Temp 36.3    POCT Glucose   Result Value Ref Range    POC Glucose 107 (H) 74 - 100 mg/dL   EKG 12 Lead   Result Value Ref Range    Ventricular Rate 64 BPM    Atrial Rate 64 BPM    P-R Interval 176 ms    QRS Duration 98 ms    Q-T Interval 452 ms    QTc Calculation (Bazett) 466 ms    P Axis 17 degrees    R Axis -19 degrees    T Axis 96 degrees   TYPE AND SCREEN   Result Value Ref Range    Expiration Date 03/29/2022,2359     Arm Band Number [id number]     ABO/Rh O POSITIVE     Antibody Screen NEGATIVE     Unit Number [id number]     Product Code Leukocyte Reduced Red Cell     Unit Divison 00     Dispense Status TRANSFUSED     Unit Issue Date/Time 331080783676     Blood Bank Unit Type and Rh O NEG     Blood Bank ISBT Product Blood Type 9500     Blood Bank Blood Product Expiration Date 773907670011     Transfusion Status OK TO TRANSFUSE     Crossmatch Result COMPATIBLE     Unit Number [id number]     Product Code Leukocyte Reduced Red Cell     Unit Divison 00     Dispense Status REL FROM [facility]     Transfusion Status OK TO TRANSFUSE     Crossmatch Result COMPATIBLE     Unit Number [id number]     Product Code Leukocyte Reduced Red Cell     Unit Divison 00     Dispense Status TRANSFUSED     Unit Issue Date/Time 361869418666     Blood Bank Unit Type and Rh O NEG     Blood Bank ISBT Product Blood Type 9500     Blood Bank Blood Product Expiration Date 903846731658     Transfusion Status OK TO TRANSFUSE     Crossmatch Result COMPATIBLE     Unit Number [id number]     Product Code Leukocyte Reduced Red Cell     Unit Divison 00     Dispense Status TRANSFUSED     Unit Issue Date/Time 437461969612     Blood Bank Unit Type and Rh O POS     Blood Bank ISBT Product Blood Type 5100     Blood Bank Blood Product Expiration Date 643241931669     Transfusion Status OK TO TRANSFUSE     Crossmatch Result COMPATIBLE     Unit Number [id number]     Product Code Leukocyte Reduced Red Cell     Unit Divison 00     Dispense Status TRANSFUSED     Unit Issue Date/Time 774706937610     Blood Bank Unit Type and Rh O POS     Blood Bank ISBT Product Blood Type 5100     Blood Bank Blood Product Expiration Date 079428218972     Transfusion Status OK TO TRANSFUSE     Crossmatch Result COMPATIBLE     Unit Number [id number]     Product Code Leukocyte Reduced Red Cell     Unit Divison 00     Dispense Status REL FROM [facility]     Transfusion Status OK TO TRANSFUSE     Crossmatch Result COMPATIBLE        Radiology/Imaging:  US GUIDED PARACENTESIS   Final Result   Successful ultrasound-guided paracentesis. US RETROPERITONEAL LIMITED   Final Result   Small nonobstructing right renal calculus. Otherwise unremarkable renal ultrasound. No hydronephrosis. CT ABDOMEN PELVIS WO CONTRAST Additional Contrast? None   Final Result   1. Cirrhosis, ascites and anasarca. 2. No bowel obstruction identified. 3. Mild distention of the colon which could indicate an ileus. 4. Worsened bilateral pleural effusions and a worsened mild-to-moderate   pericardial effusion. CT CHEST WO CONTRAST   Final Result   1. Interval decrease in size of substernal hematoma. 2.  Increasing right pleural effusion, now moderate in size. Blood products   in this area appear extrapleural.  Similar appearance of low-density left   pleural effusion. 3.  Redemonstration of sternal and bilateral rib fractures with overlying   subcutaneous edema. 4.  Cirrhotic liver morphology and ascites. XR ABDOMEN FOR NG/OG/NE TUBE PLACEMENT   Final Result   Enteric tube looped within the body of the stomach with the tip directed   superiorly towards the fundus of the stomach. XR ABDOMEN FOR NG/OG/NE TUBE PLACEMENT   Final Result   No change in position of the tip of endotracheal tube. If this has been   advanced., Question it may be coiled off the field of view of the   radiograph. .  If warranted, consider chest x-ray or soft tissue neck imaging. Otherwise, consider advancement 10-15 cm. The findings were sent to the Radiology Results [address] at 1:09   am on 3/29/2022to be communicated to a licensed caregiver. XR ABDOMEN FOR NG/OG/NE TUBE PLACEMENT   Final Result   Enteric tube terminating proximal to the GE junction. This should be   advanced approximately 10 cm, and reimaged. XR ABDOMEN FOR NG/OG/NE TUBE PLACEMENT   Final Result   Enteric tube terminates the level of the GE junction. This needs advanced   least 10 cm.       The findings were sent to the Radiology Results [id number] at 8:49   pm on 3/28/2022to be communicated to a licensed caregiver. CTA CHEST W WO CONTRAST   Final Result   Acute traumatic nondisplaced to minimally displaced fractures of the right   anterior 3rd, 4th, 7th and 8th ribs, left anterior 2nd, 3rd, 4th, 5th, 6th   and 7th ribs as well as the right anterior 4th costochondral cartilage. Acute traumatic nondisplaced fracture of the body of the sternum. Substernal hematoma with small focus of active extravasation of contrast   suggesting arterial bleeding. Hematomas in the subpleural space deep to the right costochondral cartilage   fractures and anterior rib fractures with foci of active extravasation of   contrast suggesting active bleeding from the right internal thoracic artery. Bilateral pleural effusions. Right perihilar upper lobe and lower lobe ground-glass opacities suggesting   pulmonary contusions. No pneumothorax. No acute traumatic injury of the thoracic aorta. Upper abdominal ascites. CTA ABDOMEN PELVIS W CONTRAST   Preliminary Result   No acute traumatic injury of the abdomen or pelvis. Findings of hepatic cirrhosis with abdominal and pelvic ascites possibly   related to the cirrhosis. Mildly prominent loops of bowel throughout the abdomen and pelvis with   mucosal enhancement. This may be secondary to mild ileus or shock bowel. No   evidence of bowel obstruction. No acute traumatic injury of the aorta. No active extravasation of contrast.      See separate CT of the chest for discussion of those findings. CT Head WO Contrast   Final Result   No acute intracranial abnormality. CT Cervical Spine WO Contrast   Final Result   No acute traumatic injury of the cervical spine. Mild multilevel   degenerative changes. Pleural effusions in the lung apices. XR CHEST PORTABLE   Final Result   1.   Endotracheal tube PRVC  Vt Ordered: 500 mL  Rate Set: 14 bmp  FiO2 : 40 %  SpO2: 100 %  SpO2/FiO2 ratio: 250  Sensitivity: 1  PEEP/CPAP: 5  I Time/ I Time %: 0.7 s  Humidification Source: HME  · Nitric Oxide/Epoprostenol In Use?: No   · Increasing right sided pleural effusion, moderate in size, no intervention at this time. GI/Nutrition  · Ulcer Prophylaxis: PPI   · Diet:Diet NPO   · IR for paracentesis, if >500 give albumin  · GI consult  · Bedside endoscopy done yesterday, no varices or acute bleeding seen  · ENT consult recommended  · No colonoscopy, repeat CT abdomen pelvis   · GI signed off   Renal/Fluid/Electrolyte  I/O: In: 2649.5 [I.V.:1609; Blood:495.8; NG/GT:40]  · Out: 1311 [Urine:1311]  · Monitor electrolytes, replace PRN   · Lasix gtt  · Nephrology consult follow recommendations   ID  WBC:   Lab Results   Component Value Date    WBC 9.4 2022     Tmax: Temp (24hrs), Av.1 °F (36.7 °C), Min:95.7 °F (35.4 °C), Max:100.4 °F (38 °C)  · Unasyn for aspiration pneumonia   Hematology:  Recent Labs     22  2324 22  0536 22  0422   HGB 7.3* 6.9* 7.6*   ·   4 units PRBC  Endocrine:   glucose controlled - most recent BGL is   Recent Labs     22  0536 22  1831 22  0422   GLUCOSE 151* 110* 117*     DVT Prophylaxis  · No chemoprophylaxis anticoagulation at this time. Discharge Needs:  PT, OT, ST, SW and Case Management      CODE STATUS: DNR-CCA      DISPOSITION:  [x] To remain ICU: Intubated  [] OK for out of ICU from [facility], MD  Emergency Medicine Resident  [address]  3/31/2022, 8:38 AM EDT       Attending Physician Statement  I have discussed the care of Bryant Langston, including pertinent history and exam findings,  with the resident. I have seen and examined the patient and the key elements of all parts of the encounter have been performed by me.   I agree with the assessment, plan and orders as documented by the resident with additions . MRI brain today to evaluate for anoxic brain injury. Will follow neuro critical care recommendation. Continue Lasix drip- discussed with nephrology  Continue ventilator support and supportive care. Family updated at bedside         Total critical care time caring for this patient with life threatening, unstable organ failure, including direct patient contact, management of life support systems, review of data including imaging and labs, discussions with other team members and physicians at least 27   Min so far today, excluding procedures. Treatment plan Discussed with nursing staff in detail , all questions answered . Electronically signed by Chilo Carlisle MD on   3/31/22 at 2:40 PM EDT    Please note that this chart was generated using voice recognition Dragon dictation software. Although every effort was made to ensure the accuracy of this automated transcription, some errors in transcription may have occurred.

## 2022-03-31 NOTE — FLOWSHEET NOTE
Assessment: Patient was intubated and unresponsive when  visited. Family was present and open to spiritual care. Per family, patient was not doing well. Per family, patient was raised [place]. Ni Aguilar anointed patient on March 29, 2022. Intervention:  provided presence, offered support and prayed with family at patient's bedside. Outcome: Family expressed appreciation for the spiritual and emotional support they received. Plan: Follow up visits recommended for ongoing assessment of patient's condition and for more prayers and support. 03/31/22 1405   Encounter Summary   Services provided to: Patient and family together   Support System Family members   Place of [address]   (03/31/2022)   Complexity of Encounter High   Length of Encounter 15 minutes   Spiritual Assessment Completed Yes   Routine   Type Follow up   Assessment Approachable; Anxious; Fearful   Intervention Active listening;Prayer;[place]   Outcome Expressed gratitude   Spiritual/Mandaen   Type Spiritual support   Sacraments   Sacrament of Sick-Anointing Anointed  (Anointed by Fr. Bettie Dee on March 29)

## 2022-03-31 NOTE — PROGRESS NOTES
Palliative Care Progress Note    NAME:  Ade Melendez  MEDICAL RECORD NUMBER:  [id number]  AGE: 78 y.o. GENDER: male  : 1955  TODAY'S DATE:  3/31/2022    Reason for Consult:  goals of care  History of Present Illness     The patient is a 78 y.o. Non- / non  male who presents with Cardiac Arrest      Referred to Palliative Care by  [x] Physician   [] Nursing  [] Family Request   [] Other:       He was admitted to the ICU service for Cardiac arrest ([facility] [address] [I46.9]. His hospital course has been associated with cardiac arrest, intubation and on ventilator. The patient has a complicated medical history and has been hospitalized since 3/28/2022 10:29 AM.    Lamont York to bedside to see patient and his brother Rg Bowens and sister were at bedside. I asked both of siblings if they had any questions. They both stated no that they were waiting neuro to come back and talk with them again. I did discuss comfort measures and Rg Gogo stated that they were leaning towards that decision. I explained terminal extubation and comfort medications. I also discussed if patient were to stabilize and not pass away we would discuss hospice to provide care for their brother. They thanked me for the information and I encouraged them to call me for any other questions or concerns. Rg Gogo had my contact information. I also spoke with shana and updated her on my conversation with the family.      Palliative care will continue to follow     OVERNIGHT EVENTS:  Patient family deciding on comfort care measures   Emotional support and education provided to family        BP (!) 122/50   Pulse 156   Temp 98.4 °F (36.9 °C) (Esophageal)   Resp (!) 39   Ht 5' 9\" (1.753 m)   Wt 256 lb 13.4 oz (116.5 kg)   SpO2 (!) 79%   BMI 37.93 kg/m²     Assessment        REVIEW OF SYSTEMS    [x]   UNABLE TO OBTAIN:     Patient on ventilator and unable to answer questions or follow commands unable to assess ROS        PHYSICAL ASSESSMENT: General: []  Oriented x3      [] well appearing      [x] Intubated      [x] ill appearing      [] Other:    Mental Status: [] normal mental status exam      [] drowsy      [] Confused      [] Other: non responsive     Cardiovascular: [x]  Regular rate/rhythm      [] Arrhythmia      [] Other:     Chest: [] Effort normal      [] lungs clear      [] respiratory distress      [] Tachypnea      []  Other:Patient on ventilator   Abdomen: [x] Soft/non-tender      []  Normal appearance      [] Distended      [] Ascites      [] Other:    Neurological: [] Normal Speech      [] Normal Sensation      []  Deficits present: patient on ventilator and non responsive      Extremity:  [x] normal skin color/temp      [] clubbing/cyanosis      []  No edema      [] Other:     Palliative Performance Scale:  ___60%  Ambulation reduced; Significant disease; Can't do hobbies/housework; intake normal or reduced; occasional assist; LOC full/confusion  ___50%  Mainly sit/lie; Extensive disease; Can't do any work; Considerable assist; intake normal or reduced; LOC full/confusion  ___40%  Mainly in bed; Extensive disease; Mainly assist; intake normal or reduced; LOC full/confusion   ___30%  Bed Bound; Extensive disease; Total care; intake reduced; LOCfull/confusion  __x_20%  Bed Bound; Extensive disease; Total care; intake minimal; Drowsy/coma  ___10%  Bed Bound; Extensive disease; Total care; Mouth care only; Drowsy/coma  ___0       Death      Plan      Palliative Interaction:  Went to bedside and spoke with patient siblings and provided them with emotional support and education on comfort care. I also encouraged them to call me for any questions or concerns and they have my contact number.  Palliative care will continue to follow   Education/support to family  Discharge planning/helping to coordinate care  Communications with primary service  Pharmacologic pain management  Providing support for coping/adaptation/distress of family  Discussing meaning/purpose   Caregiver support/education  Continue with current plan of care  Code status clarified: [facility]  Code status clarified: DNRCCA  Principle Problem/Diagnosis:  Cardiac arrest ([address]    Additional Assessments:    1- Symptom management/ pain control     Pain Assessment:  Comfort care meds                Anxiety:  comfort care meds                           Dyspnea:  comfort care conversations                           Fatigue:  comfort care conversations     Other:        2- Goals of care evaluation   The patient goals of care are improve or maintain function/quality of life   Goals of care discussed with:    [] Patient independently    [] Patient and Family    [x] Family or Healthcare DPOA independently    [] Unable to discuss with patient, family/DPOA not present    3- Code Status  DNR-CC    4- Other recommendations  - We will continue to provide comfort and support to the patient and the family          Palliative Care will continue to follow Mr. Beverley Martins care as needed. The note has been dictated by dragon, typing errors may be a possibility     Thank you for allowing Palliative Care to participate in the care of Mr. Dayron Doss . Electronically signed by   Brant Bernheim, APRN - NP  Palliative Care Team  on 3/31/2022 at 6:00 PM    Palliative care number [phone]    Please call with any palliative questions or concerns. Palliative Care Team is available via perfect serve or via phone.

## 2022-03-31 NOTE — PLAN OF CARE
Problem: OXYGENATION/RESPIRATORY FUNCTION  Goal: Patient will maintain patent airway  3/30/2022 2014 by Tiara Reyes RN  Outcome: Ongoing     Problem: OXYGENATION/RESPIRATORY FUNCTION  Goal: Patient will achieve/maintain normal respiratory rate/effort  Description: Respiratory rate and effort will be within normal limits for the patient  3/30/2022 2014 by Tiara Reyes RN  Outcome: Ongoing     Problem: MECHANICAL VENTILATION  Goal: Patient will maintain patent airway  3/30/2022 2014 by Tiara Reyes RN  Outcome: Ongoing     Problem: MECHANICAL VENTILATION  Goal: Oral health is maintained or improved  3/30/2022 2014 by Tiara Reyes RN  Outcome: Ongoing     Problem: MECHANICAL VENTILATION  Goal: ET tube will be managed safely  3/30/2022 2014 by Tiara Reyes RN  Outcome: Ongoing     Problem: MECHANICAL VENTILATION  Goal: Ability to express needs and understand communication  3/30/2022 2014 by Tiara Reyes RN  Outcome: Ongoing     Problem: MECHANICAL VENTILATION  Goal: Mobility/activity is maintained at optimum level for patient  3/30/2022 2014 by Tiara Reyes RN  Outcome: Ongoing     Problem: SKIN INTEGRITY  Goal: Skin integrity is maintained or improved  Outcome: Ongoing     Problem: Falls - Risk of:  Goal: Will remain free from falls  Description: Will remain free from falls  Outcome: Ongoing     Problem: Falls - Risk of:  Goal: Absence of physical injury  Description: Absence of physical injury  Outcome: Ongoing     Problem: Skin Integrity:  Goal: Will show no infection signs and symptoms  Description: Will show no infection signs and symptoms  Outcome: Ongoing     Problem: Skin Integrity:  Goal: Absence of new skin breakdown  Description: Absence of new skin breakdown  Outcome: Ongoing     Problem: Confusion - Acute:  Goal: Absence of continued neurological deterioration signs and symptoms  Description: Absence of continued neurological deterioration signs and symptoms  Outcome: Ongoing     Problem: Confusion - Acute:  Goal: Mental status will be restored to baseline  Description: Mental status will be restored to baseline  Outcome: Ongoing     Problem: Discharge Planning:  Goal: Ability to perform activities of daily living will improve  Description: Ability to perform activities of daily living will improve  Outcome: Ongoing     Problem: Discharge Planning:  Goal: Participates in care planning  Description: Participates in care planning  Outcome: Ongoing     Problem: Injury - Risk of, Physical Injury:  Goal: Will remain free from falls  Description: Will remain free from falls  Outcome: Ongoing     Problem: Injury - Risk of, Physical Injury:  Goal: Absence of physical injury  Description: Absence of physical injury  Outcome: Ongoing     Problem: Mood - Altered:  Goal: Mood stable  Description: Mood stable  Outcome: Ongoing     Problem: Mood - Altered:  Goal: Absence of abusive behavior  Description: Absence of abusive behavior  Outcome: Ongoing     Problem: Mood - Altered:  Goal: Verbalizations of feeling emotionally comfortable while being cared for will increase  Description: Verbalizations of feeling emotionally comfortable while being cared for will increase  Outcome: Ongoing     Problem: Psychomotor Activity - Altered:  Goal: Absence of psychomotor disturbance signs and symptoms  Description: Absence of psychomotor disturbance signs and symptoms  Outcome: Ongoing     Problem: Sensory Perception - Impaired:  Goal: Demonstrations of improved sensory functioning will increase  Description: Demonstrations of improved sensory functioning will increase  Outcome: Ongoing     Problem: Sensory Perception - Impaired:  Goal: Decrease in sensory misperception frequency  Description: Decrease in sensory misperception frequency  Outcome: Ongoing     Problem: Sensory Perception - Impaired:  Goal: Able to refrain from responding to false sensory perceptions  Description: Able to refrain from responding to false sensory perceptions  Outcome: Ongoing     Problem: Sensory Perception - Impaired:  Goal: Demonstrates accurate environmental perceptions  Description: Demonstrates accurate environmental perceptions  Outcome: Ongoing     Problem: Sensory Perception - Impaired:  Goal: Able to distinguish between reality-based and nonreality-based thinking  Description: Able to distinguish between reality-based and nonreality-based thinking  Outcome: Ongoing     Problem: Sensory Perception - Impaired:  Goal: Able to interrupt nonreality-based thinking  Description: Able to interrupt nonreality-based thinking  Outcome: Ongoing     Problem: Sleep Pattern Disturbance:  Goal: Appears well-rested  Description: Appears well-rested  Outcome: Ongoing     Problem: Nutrition  Goal: Optimal nutrition therapy  Outcome: Ongoing

## 2022-03-31 NOTE — PLAN OF CARE
Problem: OXYGENATION/RESPIRATORY FUNCTION  Goal: Patient will maintain patent airway  3/31/2022 0803 by Lisa Freed RN  Outcome: Ongoing  3/30/2022 2214 by Yazmin Guzman RCP  Outcome: Ongoing  3/30/2022 2014 by Evi Mae RN  Outcome: Ongoing  Goal: Patient will achieve/maintain normal respiratory rate/effort  Description: Respiratory rate and effort will be within normal limits for the patient  3/31/2022 0803 by Lisa Freed RN  Outcome: Ongoing  3/30/2022 2214 by JUSTINO HiltonP  Outcome: Ongoing  3/30/2022 2014 by Evi Mae RN  Outcome: Ongoing     Problem: MECHANICAL VENTILATION  Goal: Patient will maintain patent airway  3/31/2022 0803 by Lisa Freed RN  Outcome: Ongoing  3/30/2022 2214 by Yazmin Guzman RCP  Outcome: Ongoing  3/30/2022 2014 by Evi Mae RN  Outcome: Ongoing  Goal: Oral health is maintained or improved  3/31/2022 0803 by Lisa Freed RN  Outcome: Ongoing  3/30/2022 2214 by Yazmin Guzman RCP  Outcome: Ongoing  3/30/2022 2014 by Evi Mae RN  Outcome: Ongoing  Goal: ET tube will be managed safely  3/31/2022 0803 by Lisa Freed RN  Outcome: Ongoing  3/30/2022 2214 by Yazmin Guzman RCP  Outcome: Ongoing  3/30/2022 2014 by Evi Mae RN  Outcome: Ongoing  Goal: Ability to express needs and understand communication  3/31/2022 0803 by Lisa Freed RN  Outcome: Ongoing  3/30/2022 2214 by JUSTINO HiltonP  Outcome: Ongoing  3/30/2022 2014 by Evi Mae RN  Outcome: Ongoing  Goal: Mobility/activity is maintained at optimum level for patient  3/31/2022 0803 by Lisa Freed RN  Outcome: Ongoing  3/30/2022 2214 by Yazmin Guzman RCP  Outcome: Ongoing  3/30/2022 2014 by Evi Mae RN  Outcome: Ongoing     Problem: SKIN INTEGRITY  Goal: Skin integrity is maintained or improved  3/31/2022 0803 by Lisa Freed RN  Outcome: Ongoing  3/30/2022 2214 by Yazmin Guzman RCP  Outcome: Ongoing  3/30/2022 2014 by Evi Mae, from falls  Description: Will remain free from falls  3/31/2022 0803 by Fern Vaughn RN  Outcome: Ongoing  3/30/2022 2014 by Surya Valenzuela RN  Outcome: Ongoing  Goal: Absence of physical injury  Description: Absence of physical injury  3/31/2022 0803 by Fern Vaughn RN  Outcome: Ongoing  3/30/2022 2014 by Surya Valenzuela RN  Outcome: Ongoing     Problem: Mood - Altered:  Goal: Mood stable  Description: Mood stable  3/31/2022 0803 by Fern Vaughn RN  Outcome: Ongoing  3/30/2022 2014 by Surya Valenzuela RN  Outcome: Ongoing  Goal: Absence of abusive behavior  Description: Absence of abusive behavior  3/31/2022 0803 by Fern Vaughn RN  Outcome: Ongoing  3/30/2022 2014 by Sruya Valenzuela RN  Outcome: Ongoing  Goal: Verbalizations of feeling emotionally comfortable while being cared for will increase  Description: Verbalizations of feeling emotionally comfortable while being cared for will increase  3/31/2022 0803 by Fern Vaughn RN  Outcome: Ongoing  3/30/2022 2014 by Surya Valenzuela RN  Outcome: Ongoing     Problem: Psychomotor Activity - Altered:  Goal: Absence of psychomotor disturbance signs and symptoms  Description: Absence of psychomotor disturbance signs and symptoms  3/31/2022 0803 by Fern Vaughn RN  Outcome: Ongoing  3/30/2022 2014 by Surya Valenzuela RN  Outcome: Ongoing     Problem: Sensory Perception - Impaired:  Goal: Demonstrations of improved sensory functioning will increase  Description: Demonstrations of improved sensory functioning will increase  3/31/2022 0803 by Fern Vaughn RN  Outcome: Ongoing  3/30/2022 2014 by Surya Valenzuela RN  Outcome: Ongoing  Goal: Decrease in sensory misperception frequency  Description: Decrease in sensory misperception frequency  3/31/2022 0803 by Fern Vaughn RN  Outcome: Ongoing  3/30/2022 2014 by Surya Valenzuela RN  Outcome: Ongoing  Goal: Able to refrain from responding to false sensory perceptions  Description: Able to refrain from responding to false sensory perceptions  3/31/2022 0803 by Jannie Schmidt RN  Outcome: Ongoing  3/30/2022 2014 by Kate Anders RN  Outcome: Ongoing  Goal: Demonstrates accurate environmental perceptions  Description: Demonstrates accurate environmental perceptions  3/31/2022 0803 by Jannie Schmidt RN  Outcome: Ongoing  3/30/2022 2014 by Kate Anders RN  Outcome: Ongoing  Goal: Able to distinguish between reality-based and nonreality-based thinking  Description: Able to distinguish between reality-based and nonreality-based thinking  3/31/2022 0803 by Jannie Schmidt RN  Outcome: Ongoing  3/30/2022 2014 by Kate Anders RN  Outcome: Ongoing  Goal: Able to interrupt nonreality-based thinking  Description: Able to interrupt nonreality-based thinking  3/31/2022 0803 by Jannie Schmidt RN  Outcome: Ongoing  3/30/2022 2014 by Kate Anders RN  Outcome: Ongoing     Problem: Sleep Pattern Disturbance:  Goal: Appears well-rested  Description: Appears well-rested  3/31/2022 0803 by Jannie Schmidt RN  Outcome: Ongoing  3/30/2022 2014 by Kate Anders RN  Outcome: Ongoing     Problem: Nutrition  Goal: Optimal nutrition therapy  3/31/2022 0803 by Jannie Schmidt RN  Outcome: Ongoing  3/30/2022 2014 by Kate Anders RN  Outcome: Ongoing

## 2022-03-31 NOTE — PROGRESS NOTES
Physician Progress Note      Reid Stovall  CSN #:                  [id number]  :                       1955  ADMIT DATE:       3/28/2022 10:29 AM  DISCH DATE:  RESPONDING  PROVIDER #:        Carmelita Tom MD          QUERY TEXT:    Patient admitted following cardiac arrest. per lab trop 12>42>83>108  EKG st   and t wave abnormality consider lateral ischemia. Intubated PTA cxr with   bilateral airspace disease. noted hgb 5.6 on admission with hematemesis. Neuro   consult documenting concern for possible GI bleed as etiology of anemia and   subsequent cardiac arrest    If possible, please clarify one of the following    The medical record reflects the following:  Risk Factors: age, alcohol abuse, cirrhosis of liver  Clinical Indicators: admitted following cardiac arrest. per lab trop   12>42>83>108  EKG st and t wave abnormality consider lateral ischemia. Intubated PTA cxr with bilateral airspace disease. noted hgb 5.6 on admission   with hematemesis. Neuro consult documenting concern for possible GI bleed as   etiology of anemia and subsequent cardiac arrest  Treatment: mechanical ventilation, PRBC, EKG, cardiology consult, ICU   monitoring    Thank Beverly Patricia RN BSN  CCDS  Email [email or web address]  Cell [phone]  office hours M-F 6am to 2:30pm  Options provided:  -- Cardiac Arrest due to AMI  -- Cardiac Arrest due to Acute Respiratory Failure  -- Cardiac Arrest due to anemia from hematemesis  -- Other - I will add my own diagnosis  -- Disagree - Not applicable / Not valid  -- Disagree - Clinically unable to determine / Unknown  -- Refer to Clinical Documentation Reviewer    PROVIDER RESPONSE TEXT:    This patient had cardiac arrest due to anemia from hematemesis . Query created by:  Sushant Shetty on 3/30/2022 11:24 AM      Electronically signed by:  Carmelita Tom MD 3/30/2022 10:02 PM

## 2022-03-31 NOTE — PROGRESS NOTES
Daily Progress Note  Neuro Critical Care    Patient Name: Zohaib Mae  Patient : 1955  Room/Bed: 9371/4921-79  Code Status: DNR-CCA  Allergies: No Known Allergies    CHIEF COMPLAINT:      Post Cardiac arrest     INTERVAL HISTORY    Initial Presentation (Admitted 3/28/22): The patient is a 78 y.o. male admitted on 3/28 after cardiac arrest with prolonged downtime. According to the medical record, the patient was walking to the grocery store, which he does every day, when he collapsed to the ground and was in cardiac arrest.  No bystander CPR until EMS arrival.  In the field, the patient underwent CPR with PEA throughout, epinephrine x4. Igel was placed by EMS and patient arrived to ER with ROSC, however was bradycardic and initiated on epinephrine. Approximately 40 minutes of downtime. Shortly after arrival in the emergency department, the patient again lost pulses, underwent 1 round of CPR, epi x1, ROSC. Was intubated, coded again, CPR x1 round, bicarb x1, continuous epinephrine infusion.      While in the emergency department, the patient was found to have a mixed metabolic acidosis with pH 6.7 and CO2 of 95, hemoglobin 5.6. Status post 1 unit transfusion PRBC. CXR showed bilateral airspace disease that may represent inflammatory process or multifocal infection. CT head was unremarkable. Ct cervical showed pleural effusions in the apices of the lungs. CTA chest showed traumatic nondisplaced rib fractures and a traumatic nondisplaced fracture of the body of the sternum. A substernal hematoma with small focus of active extravasation of contrast suggesting arterial bleed and multiple hematomas in the subpleural space with active extravasation suggesting bleeding from the right internal thoracic artery. Ground glass opacities suggesting pulmonary contusions with upper abdominal ascites.      He was admitted to the medical ICU for post cardiac arrest management.  Myoclonic jerking noted, which terminated after 4mg Versed.     His medical history significant for hypertension and alcohol use disorder.     The patient is 1 of 7 siblings, family requesting neurological prognostication. Hospital Course:   3/29: Concern for seizures with tongue twitching, given Versed 10 mg x 1 with no change, did not correlate on LTME. Versed infusion discontinued. Last 24h:   No acute events overnight. No further seizures noted. CURRENT MEDICATIONS:  SCHEDULED MEDICATIONS:    CONTINUOUS INFUSIONS:    PRN MEDICATIONS:   morphine, LORazepam, glycopyrrolate, fentanNYL, [DISCONTINUED] ondansetron **OR** ondansetron    VITALS:  Temperature Range: Temp: 98.4 °F (36.9 °C) Temp  Av.5 °F (36.4 °C)  Min: 95.7 °F (35.4 °C)  Max: 99 °F (37.2 °C)  BP Range: Systolic (14VOH), WTK:577 , Min:122 , MTU:320     Diastolic (20NEH), RBJ:59, Min:50, Max:50    Pulse Range: Pulse  Av.1  Min: 64  Max: 92  Respiration Range: Resp  Av.5  Min: 7  Max: 33  Current Pulse Ox: SpO2: 96 %  24HR Pulse Ox Range: SpO2  Av.6 %  Min: 96 %  Max: 100 %  Patient Vitals for the past 12 hrs:   BP Temp Temp src Pulse Resp SpO2   22 1520    67 18 96 %   22 1233    65 18 99 %   22 1029    73 14 99 %   22 1028    75 14 99 %   22 0808     28 100 %   22 0800 (!) 122/50 98.4 °F (36.9 °C) Esophageal 75 10 100 %   22 0759    74 14 100 %   22 0645    75  100 %   22 0630    73  100 %   22 0615    71  100 %   22 0600  97.9 °F (36.6 °C) Esophageal 72  100 %   22 0545    72  100 %   22 0530    72  100 %   22 0515    69  100 %   22 0500    71  100 %     Estimated body mass index is 37.93 kg/m² as calculated from the following:    Height as of this encounter: 5' 9\" (1.753 m).     Weight as of this encounter: 256 lb 13.4 oz (116.5 kg).  []<16 Severe malnutrition  []1616.99 Moderate malnutrition  []1718.49 Mild malnutrition  []18.524.9 Normal  []2529.9 Overweight (not obese)  []3034.9 Obese class 1 (Low Risk)  [x]3539.9 Obese class 2 (Moderate Risk)  []?40 Obese class 3 (High Risk)    RECENT LABS:   Lab Results   Component Value Date    WBC 9.4 03/31/2022    HGB 7.6 (L) 03/31/2022    HCT 22.9 (L) 03/31/2022    PLT 81 (L) 03/31/2022    TRIG 50 03/30/2022    ALT 33 03/31/2022    AST 92 (H) 03/31/2022     03/31/2022    K 3.5 (L) 03/31/2022     03/31/2022    CREATININE 4.09 (H) 03/31/2022    BUN 34 (H) 03/31/2022    CO2 24 03/31/2022    INR 2.0 03/28/2022     24 HOUR INTAKE/OUTPUT:    Intake/Output Summary (Last 24 hours) at 3/31/2022 1655  Last data filed at 3/31/2022 1018  Gross per 24 hour   Intake 1928.35 ml   Output 1360 ml   Net 568.35 ml       IMAGING:   MRI BRAIN WO CONTRAST   Final Result   Abnormal signal and restricted diffusion within the cerebral hemispheres,   including the basal ganglia and thalamus, as well as the cerebellar   hemispheres bilaterally as described above consistent with anoxic/hypoxic   injury. US GUIDED PARACENTESIS   Final Result   Successful ultrasound-guided paracentesis. US RETROPERITONEAL LIMITED   Final Result   Small nonobstructing right renal calculus. Otherwise unremarkable renal ultrasound. No hydronephrosis. CT ABDOMEN PELVIS WO CONTRAST Additional Contrast? None   Final Result   1. Cirrhosis, ascites and anasarca. 2. No bowel obstruction identified. 3. Mild distention of the colon which could indicate an ileus. 4. Worsened bilateral pleural effusions and a worsened mild-to-moderate   pericardial effusion. CT CHEST WO CONTRAST   Final Result   1. Interval decrease in size of substernal hematoma. 2.  Increasing right pleural effusion, now moderate in size. Blood products   in this area appear extrapleural.  Similar appearance of low-density left   pleural effusion.       3.  Redemonstration of sternal and bilateral rib fractures with overlying   subcutaneous edema. 4.  Cirrhotic liver morphology and ascites. XR ABDOMEN FOR NG/OG/NE TUBE PLACEMENT   Final Result   Enteric tube looped within the body of the stomach with the tip directed   superiorly towards the fundus of the stomach. XR ABDOMEN FOR NG/OG/NE TUBE PLACEMENT   Final Result   No change in position of the tip of endotracheal tube. If this has been   advanced., Question it may be coiled off the field of view of the   radiograph. .  If warranted, consider chest x-ray or soft tissue neck imaging. Otherwise, consider advancement 10-15 cm. The findings were sent to the Radiology Results [id number] at 1:09   am on 3/29/2022to be communicated to a licensed caregiver. XR ABDOMEN FOR NG/OG/NE TUBE PLACEMENT   Final Result   Enteric tube terminating proximal to the GE junction. This should be   advanced approximately 10 cm, and reimaged. XR ABDOMEN FOR NG/OG/NE TUBE PLACEMENT   Final Result   Enteric tube terminates the level of the GE junction. This needs advanced   least 10 cm. The findings were sent to the Radiology Results [id number] at 8:49   pm on 3/28/2022to be communicated to a licensed caregiver. CTA CHEST W WO CONTRAST   Final Result   Acute traumatic nondisplaced to minimally displaced fractures of the right   anterior 3rd, 4th, 7th and 8th ribs, left anterior 2nd, 3rd, 4th, 5th, 6th   and 7th ribs as well as the right anterior 4th costochondral cartilage. Acute traumatic nondisplaced fracture of the body of the sternum. Substernal hematoma with small focus of active extravasation of contrast   suggesting arterial bleeding. Hematomas in the subpleural space deep to the right costochondral cartilage   fractures and anterior rib fractures with foci of active extravasation of   contrast suggesting active bleeding from the right internal thoracic artery.       Bilateral pleural effusions. Right perihilar upper lobe and lower lobe ground-glass opacities suggesting   pulmonary contusions. No pneumothorax. No acute traumatic injury of the thoracic aorta. Upper abdominal ascites. CTA ABDOMEN PELVIS W CONTRAST   Final Result   No acute traumatic injury of the abdomen or pelvis. Findings of hepatic cirrhosis with abdominal and pelvic ascites possibly   related to the cirrhosis. Mildly prominent loops of bowel throughout the abdomen and pelvis with   mucosal enhancement. This may be secondary to mild ileus or shock bowel. No   evidence of bowel obstruction. No acute traumatic injury of the aorta. No active extravasation of contrast.      See separate CT of the chest for discussion of those findings. CT Head WO Contrast   Final Result   No acute intracranial abnormality. CT Cervical Spine WO Contrast   Final Result   No acute traumatic injury of the cervical spine. Mild multilevel   degenerative changes. Pleural effusions in the lung apices. XR CHEST PORTABLE   Final Result   1. Endotracheal tube terminates 2 cm above the matthew. Enteric tube   terminates at the body of the stomach. 2.  Bilateral airspace disease, which may represent inflammatory process or   multifocal infection. Labs and Images reviewed with:  [x] Dr. Amy Ernst. Tino    [] Dr. Tushar Biswas  [] Dr. Willi Devine  [] There are no new interval images to review. PHYSICAL EXAM       CONSTITUTIONAL:  Intubated, off sedation. Opens left eye briefly to deep noxious stimulation. Dysconjugate gaze. Does not track or follow commands. No spontaneous movement.     HEAD:  normocephalic   EYES:  Pupils 1mm, brisk bilaterally   ENT:  moist mucous membranes   NECK:  supple, symmetric   LUNGS:  Equal air entry bilaterally   CARDIOVASCULAR:  normal s1 / s2, RRR, distal pulses intact   ABDOMEN:  Soft, no rigidity   NEUROLOGIC:  Mental Status: Intubated, off sedation. Does not open eyes or follow commands             Cranial Nerves:    III: Pupils:  equal, round, reactive to light  V: Corneal reflex:  completed. abnormal absent bilaterally  Weak cough    Motor Exam:    Extensor posturing noted to right upper extremity. Withdraw noted to left upper extremity. No movement to bilateral lower extremities. DRAINS:  [x] There are no drains for Neuro Critical Care to monitor at this time. ASSESSMENT AND PLAN:       This is a 78 y.o. male admitted on 3/28 after cardiac arrest with downtime of approximately 40 minutes. No bystander CPR until EMS arrived, approximately 10 minutes after resting. Patient with 40 minutes of PEA arrest, followed by PEA arrest x2 with eventual ROSC in the emergency department with continuous epinephrine drip, bicarbonate and 1 unit PRBC transfused due to hemoglobin of 5.6. Concern for possible GI bleed as etiology of anemia and subsequent cardiac arrest.  Given his prolonged downtime and poor neurological examination, requesting neuro prognostication. Concern for anoxic brain injury, given prolonged downtime. Also concern for subclinical status epilepticus, given myoclonic jerking prior to my evaluation.     PLAN/MEDICAL DECISION MAKING:     - LTME with no seizures, attenuated  - Loaded with 4g Keppra x1; 1g BID maintenance dose  - Neuron-specific enolase ordered. - MRI brain showing diffuse anoxic injury, results discussed with brother ans sister at length 3/31/22  - Recommend palliative care consult for family support. - Remainder of management per primary.     We will continue to follow along.      For any changes in exam or patient status please contact Neuro Critical Care.         Lala Still MD, [id number]  PGY-4 Neurology   3/31/2022 at 4:55 PM

## 2022-03-31 NOTE — PROGRESS NOTES
NEPHROLOGY PROGRESS NOTE      SUBJECTIVE     All events noted. Status post cardiac arrest and ROMAN. On Lasix to 40 mg an hour. Making around 50 to 75 cc of urine per hour. Continues to be on low-dose pressors. On mechanical ventilation. Plans for MRI today for prognostication. Renal function continues to decline. OBJECTIVE     Vitals:    03/31/22 0800 03/31/22 0808 03/31/22 1028 03/31/22 1029   BP: (!) 122/50      Pulse: 75  75 73   Resp: 10 28 14 14   Temp: 98.4 °F (36.9 °C)      TempSrc: Esophageal      SpO2: 100% 100% 99% 99%   Weight:       Height:         24HR INTAKE/OUTPUT:      Intake/Output Summary (Last 24 hours) at 3/31/2022 1055  Last data filed at 3/31/2022 1018  Gross per 24 hour   Intake 2424.18 ml   Output 1430 ml   Net 994.18 ml       General appearance: Mechanical ventilation  Respiratory::vesicular breath sounds,no wheeze/crackles  Cardiovascular:S1 S2 normal,no gallop or organic murmur.   Abdomen: Distended  Extremities: No Cyanosis or Clubbing, present lower extremity edema  Neurological: Mechanical ventilator      MEDICATIONS     Scheduled Meds:    rocuronium        ampicillin-sulbactam  3,000 mg IntraVENous Q12H    insulin lispro  0-18 Units SubCUTAneous Q4H    pantoprazole (PROTONIX) 40 mg injection  40 mg IntraVENous Q12H    rifaximin  200 mg Per NG tube TID    levetiracetam  1,000 mg IntraVENous Q12H     Continuous Infusions:    sodium chloride      sodium chloride 50 mL/hr at 03/31/22 0718    furosemide (LASIX) 5mg/ml infusion 40 mg/hr (03/31/22 0818)    sodium chloride      dextrose      DOPamine 1 mcg/kg/min (03/31/22 0816)     PRN Meds:  rocuronium, sodium chloride, fentanNYL, sodium chloride, ondansetron **OR** ondansetron, polyethylene glycol, glucose, dextrose, glucagon (rDNA), dextrose  Home Meds:                Medications Prior to Admission: metoprolol succinate (TOPROL XL) 50 MG extended release tablet, Take 200 mg by mouth daily     INVESTIGATIONS     Last 3 CMP:    Recent Labs     03/29/22  0500 03/29/22  1731 03/30/22  0536 03/30/22  1831 03/31/22  0422      < > 142 141 137   K 3.7   < > 3.1* 3.6* 3.5*      < > 105 103 100   CO2 18*   < > 24 24 24   BUN 18   < > 29* 33* 34*   CREATININE 2.08*   < > 3.23* 3.96* 4.09*   CALCIUM 7.3*   < > 7.0* 7.2* 7.2*   PROT 5.7*  --  5.1*  --  5.2*   LABALBU 2.5*  --  2.1*  --  2.3*   BILITOT 2.08*  --  1.53*  --  1.47*   ALKPHOS 82  --  63  --  58   AST 81*  --  85*  --  92*   ALT 41  --  37  --  33    < > = values in this interval not displayed. Last 3 CBC:  Recent Labs     03/29/22  0500 03/29/22  1045 03/29/22  2324 03/30/22  0536 03/31/22  0422   WBC 15.1*  --   --  9.9 9.4   RBC 2.96*  --   --  2.73* 2.95*   HGB 7.1*   < > 7.3* 6.9* 7.6*   HCT 22.0*   < > 22.2* 20.4* 22.9*   MCV 74.3*  --   --  74.7* 77.6*   MCH 24.0*  --   --  25.3 25.8   MCHC 32.3  --   --  33.8 33.2   RDW 18.6*  --   --  19.9* 21.2*     --   --  92* 81*   MPV 10.7  --   --  10.3 10.5    < > = values in this interval not displayed. ASSESSMENT     #1 acute kidney injury nonoliguric secondary to ischemic(status post cardiac arrest/hypotension) and nephrotoxic (contras) ATN -evolving  Baseline creatinine normal  #2 status post cardiac arrest  #3 circulatory shock on pressors  #4 acute hypoxic respiratory failure  #5 anoxic encephalopathy  #6 cirrhosis of liver  #7 morbid obesity    PLAN     #1 continue Lasix drip. BMP every 8. Discussed with brother option of dialysis based upon MRI brain/prognosis.   #2 avoid gadolinium with MRI  #3 poor prognosis      Please do not hesitate to call with questions    This note is created with the assistance of a speech-recognition program. While intending to generate a document that actually reflects the content of the visit, no guarantees can be provided that every mistake has been identified and corrected by editing    Phoebe Gupta MD MD, MRCP Bunny Hernandez   3/31/2022 10:55 AM  [facility]

## 2022-03-31 NOTE — PLAN OF CARE
Problem: OXYGENATION/RESPIRATORY FUNCTION  Goal: Patient will maintain patent airway  3/30/2022 2214 by Elliott Pinon RCP  Outcome: Ongoing     Problem: OXYGENATION/RESPIRATORY FUNCTION  Goal: Patient will achieve/maintain normal respiratory rate/effort  Description: Respiratory rate and effort will be within normal limits for the patient  3/30/2022 2214 by Elliott Pinon RCP  Outcome: Ongoing     Problem: MECHANICAL VENTILATION  Goal: Patient will maintain patent airway  3/30/2022 2214 by Elliott Pinon RCP  Outcome: Ongoing     Problem: MECHANICAL VENTILATION  Goal: Oral health is maintained or improved  3/30/2022 2214 by Elliott Pinon RCP  Outcome: Ongoing     Problem: MECHANICAL VENTILATION  Goal: ET tube will be managed safely  3/30/2022 2214 by Elliott Pinon RCP  Outcome: Ongoing     Problem: MECHANICAL VENTILATION  Goal: Ability to express needs and understand communication  3/30/2022 2214 by Elliott Pinon RCP  Outcome: Ongoing     Problem: MECHANICAL VENTILATION  Goal: Mobility/activity is maintained at optimum level for patient  3/30/2022 2214 by Elliott Pinon RCP  Outcome: Ongoing     Problem: SKIN INTEGRITY  Goal: Skin integrity is maintained or improved  3/30/2022 2214 by Elliott Pinon RCP  Outcome: Ongoing

## 2022-04-01 NOTE — PROGRESS NOTES
Dr. Carol Rosa at bedside [address]. Patient family at bedside- [address] and Ady's wife Tasha Garrett.

## 2022-04-01 NOTE — PROGRESS NOTES
Critical care team - Resident sign-out to medicine service      Date and time: 3/31/2022 9:18 PM  Patient's name:  Trinidad Cornell  Medical Record Number: [id number]  Patient's account/billing number: [de-identified]  Patient's YOB: 1955  Age: 78 y.o. Date of Admission: 3/28/2022 10:29 AM  Length of stay during current admission: 3    Primary Care Physician: Sayra Dickens    Code Status: DNR-CC    Mode of physician to physician communication:        [x] Via telephone   [] In person     Date and time of sign-out: 3/31/2022 9:18 PM    Accepting Internal Medicine resident: Mariola Hamilton NP    Accepting Medicine team: IM Team Intermed    Accepting team's attending: Dr. Alka Benjamin     Patient's current ICU Bed:  119     Patient's assigned bed on floor:  234        [x] Med-Surg  [] Step-down       [] Psychiatry ICU       [] Psych floor     Reason for ICU admission:     Cardiac Arrest     ICU course summary:     67yoM, cardiac arrest at grocery store; no bystander CPR. PEA arrest w/ CPR x40min by EMS, epi x4. ROSC. Coded twice more in ED. Hematemasis, red blood per rectum. Myoclonic jerking requiring Versed 4mg. Imaging showed substernal hematoma with active arterial extrav. Consults: ct surg, NCC, cardio, palliative, ENT,     3/28: Bedside EGD negative for variceal bleed, ulcers or gastritis. ENT says nothing to do for retropharyngeal bleed. Transfused 2U PRBCs. CODE STATUS DNR-CCA    3/29: Continued to require blood products, taken off versed drip. 3/30: Started on lasix drip, IR did bedside paracentesis  3/31: anoxic injury on MRI. Discussed with family. Terminally extubated.      Procedures during patient's ICU stay:     CVC, Arterial Line     Current Vitals:     BP (!) 122/50   Pulse 148   Temp 99 °F (37.2 °C) (Axillary)   Resp (!) 39   Ht 5' 9\" (1.753 m)   Wt 256 lb 13.4 oz (116.5 kg)   SpO2 (!) 86%   BMI 37.93 kg/m²       Cultures:     Blood cultures:                 [x] None drawn        Urine Culture:                   [x]  Positive (Details: E. Fecalis  )  Sputum Culture:              [x]  Positive (Details: Gram Positive Cocci in Pairs  )         Consults: 1. Nephro  2. Neuro Crit Care   3. GI   4. CT Surgery     Assessment:     Patient Active Problem List    Diagnosis Date Noted    ACP (advance care planning)     Goals of care, counseling/discussion     Encounter for palliative care     Acute kidney injury (ROMAN) with acute tubular necrosis (ATN) ([facility]     Metabolic alkalosis     Metabolic acidosis     Cardiac arrest ([facility] 03/28/2022    Acute blood loss anemia     Hematemesis     Alcoholic cirrhosis of liver with ascites (HCC)     Alcohol intoxication in alcoholism with blood level over 0.3 without complication ([facility] 20/53/7273       Additional assessment:    1. Recommended Follow-up:     1. Palliative Care Measures  2. Hospice         Above mentioned assessment and plan was discussed by me with the admitting medicine resident. The medicine team assigned to the patient by medicine admitting resident will be following up the patient from now onwards on the floor.        Bryan Bruce MD  Emergency Medicine Resident  [address]  3/31/2022, 9:18 PM EDT

## 2022-04-01 NOTE — PROGRESS NOTES
Nutrition Note    Pt has been made [facility] and was terminally extubated yesterday. Will continue to monitor. RD will sign off at this time. Please consult as needed.     Electronically signed by Sukhi Harrington RD, LD on 4/1/22 at 11:35 AM EDT    Contact: [phone]

## 2022-04-01 NOTE — CONSULTS
[facility]  Office: [address], DO, Otis Larson, DO, Clay Galindo, DO, Librado Sunshine Blood, DO, Judit Lopez MD, Andreea Allen MD, Clark Reyes MD, Daly Wilson MD, Rosita Morrison MD, Loreto Mead MD, London Wilkinson MD, Sara Pearson, DO, Media Sensor, DO, El Do MD,  Marcos Ruelas, DO, Isha Farfan MD, Fabiano Ventura MD, Татьяна Mcpherson MD, Milla Fuentes DO, Zina Ashley MD, Emma Casanova MD, Sultana Veronica, [facility], [facility], CNP, Eddie Pimentel, CNP, Wendy Reese, CNS, Anastacio Cassidy, CNP, Cris Conde, CNP, Magali Brown, CNP, Reyes Aguilar, CNP, Nash Isbell, CNP, Namrata Michael PA-C, Herminia Vera, [facility], Damaris Serrano, [facility], Chana Bennett, CNP, Billie Dickinson, CNP, Melvi Saravia, [facility]         [address] / HISTORY AND PHYSICAL EXAMINATION            Date:   4/1/2022  Patient name:  Arthea Boeck  Date of admission:  3/28/2022 10:29 AM  MRN:   [id number]  Account:  [de-identified]  YOB: 1955  PCP:    Jaida Houston  Room:   9453/6861-73  Code Status:    DNR-CC    Physician Requesting Consult: Daly Wilson MD    Reason for Consult:  Transition of care    Chief Complaint:     Chief Complaint   Patient presents with    Cardiac Arrest       History Obtained From:     Cardiac arrest    History of Present Illness:     Per critical care     72-year-old male with a history of alcohol use, hypertension presented to the emergency room by EMS as a postarrest.  Patient had an unknown downtime and was found prone on the ground. No bystander CPR was done. EMS performed CPR for about 40 minutes, patient was initially found to be in PEA arrest.  An eye gel was used to establish airway. He was given 4 rounds of epinephrine during CPR and had ROSC on arrival.  Patient then became bradycardic and CPR is in seconds initiated, ROSC was achieved after 1 round and 1 mg of epi.   Patient was started on epinephrine drip and intubated. Patient reportedly had hematemesis before intubation as well as brown/pink fluid return with his OG. He once again lost pulses and wanted to rest.  CPR was initiated and amp of bicarb was given and ROSC was obtained. Patient's hemoglobin was found to be 5.6. Imaging showed bilateral airspace disease, pleural effusions in the apices of the lungs, traumatic nondisplaced rib fractures, traumatic nondisplaced sternal fracture, substernal hematoma with active extravasation resting arterial bleed.      3/28: Epi drip was discontinued, dopamine drip started, sedation discontinued, arterial line placed in the right radial artery, patient placed on bicarb drip. Bedside EGD was done and showed active retropharyngeal bleeding, ENT was consulted. Octreotide drip, Protonix drip were discontinued.      3/29: 1 Unit PRBC overnight, No colonoscopy per GI instead repeat CT abdomen pelvis in AM     3/30: Started on lasix drip, IR did bedside paracentesis  3/31: anoxic injury on MRI. Discussed with family. Terminally extubated. Patient transferred to the floor after terminal extubation code status is now [facility]     Consults: Neuro crit care, palliative care, CT surgery, ENT, GI    Past Medical History:     Past Medical History:   Diagnosis Date    Hypertension         Past Surgical History:     Past Surgical History:   Procedure Laterality Date    UPPER GASTROINTESTINAL ENDOSCOPY N/A 3/28/2022    ** BED SIDE** EGD ESOPHAGOGASTRODUODENOSCOPY performed by José Miguel Mcbride MD at [facility]        Medications Prior to Admission:     Prior to Admission medications    Medication Sig Start Date End Date Taking? Authorizing Provider   metoprolol succinate (TOPROL XL) 50 MG extended release tablet Take 200 mg by mouth daily     Historical Provider, MD        Allergies:     Patient has no known allergies. Social History:     Tobacco:    reports that he has never smoked.  He has never used smokeless tobacco.  Alcohol:      reports current alcohol use of about 6.0 standard drinks of alcohol per week. Drug Use:  reports no history of drug use. Family History:     History reviewed. No pertinent family history. Review of Systems:     Positive and Negative as described in HPI. Review of Systems   Unable to perform ROS: Patient unresponsive       Physical Exam:     BP (!) 88/54   Pulse 121   Temp 97.5 °F (36.4 °C) (Axillary)   Resp (!) 34   Ht 5' 9\" (1.753 m)   Wt 256 lb 13.4 oz (116.5 kg)   SpO2 (!) 88%   BMI 37.93 kg/m²   Temp (24hrs), Av.6 °F (37 °C), Min:97.5 °F (36.4 °C), Max:99.2 °F (37.3 °C)    Recent Labs     22  1622 228 22  2344 22  0417   POCGLU 93 100 113* 107*       Intake/Output Summary (Last 24 hours) at 2022 0751  Last data filed at 3/31/2022 2120  Gross per 24 hour   Intake    Output 420 ml   Net -420 ml       Physical Exam  Vitals and nursing note reviewed. Constitutional:       General: He is not in acute distress. Appearance: He is well-developed. He is not diaphoretic. HENT:      Head: Normocephalic. Right Ear: Hearing normal.      Left Ear: Hearing normal.      Nose: Nose normal. No rhinorrhea. Eyes:      General: Lids are normal.      Extraocular Movements:      Right eye: Normal extraocular motion. Left eye: Normal extraocular motion. Conjunctiva/sclera: Conjunctivae normal.      Right eye: Right conjunctiva is not injected. Left eye: Left conjunctiva is not injected. Pupils: Pupils are equal, round, and reactive to light. Pupils are equal.      Right eye: Pupil is reactive. Left eye: Pupil is reactive. Neck:      Thyroid: No thyromegaly. Vascular: No carotid bruit. Trachea: Trachea and phonation normal. No tracheal deviation. Cardiovascular:      Rate and Rhythm: Regular rhythm. Bradycardia present. Heart sounds: Normal heart sounds. No murmur heard.       Pulmonary: Effort: Pulmonary effort is normal. Bradypnea present. No respiratory distress. Breath sounds: Normal breath sounds. No stridor. No decreased breath sounds. Abdominal:      General: Bowel sounds are normal. There is no distension. Palpations: Abdomen is soft. There is no mass. Tenderness: There is no abdominal tenderness. There is no guarding. Musculoskeletal:         General: No tenderness. Cervical back: Neck supple. Skin:     General: Skin is warm. Findings: No erythema, lesion or rash. Neurological:      Mental Status: He is unresponsive. He is not disoriented. Investigations:      Laboratory Testing:  No results found for this or any previous visit (from the past 24 hour(s)). Imaging/[employer]:  CT ABDOMEN PELVIS WO CONTRAST Additional Contrast? None    Result Date: 3/30/2022  1. Cirrhosis, ascites and anasarca. 2. No bowel obstruction identified. 3. Mild distention of the colon which could indicate an ileus. 4. Worsened bilateral pleural effusions and a worsened mild-to-moderate pericardial effusion. CT Head WO Contrast    Result Date: 3/28/2022  No acute intracranial abnormality. CT CHEST WO CONTRAST    Result Date: 3/29/2022  1. Interval decrease in size of substernal hematoma. 2.  Increasing right pleural effusion, now moderate in size. Blood products in this area appear extrapleural.  Similar appearance of low-density left pleural effusion. 3.  Redemonstration of sternal and bilateral rib fractures with overlying subcutaneous edema. 4.  Cirrhotic liver morphology and ascites. CTA CHEST W WO CONTRAST    Result Date: 3/28/2022  Acute traumatic nondisplaced to minimally displaced fractures of the right anterior 3rd, 4th, 7th and 8th ribs, left anterior 2nd, 3rd, 4th, 5th, 6th and 7th ribs as well as the right anterior 4th costochondral cartilage. Acute traumatic nondisplaced fracture of the body of the sternum.  Substernal hematoma with small focus of active extravasation of contrast suggesting arterial bleeding. Hematomas in the subpleural space deep to the right costochondral cartilage fractures and anterior rib fractures with foci of active extravasation of contrast suggesting active bleeding from the right internal thoracic artery. Bilateral pleural effusions. Right perihilar upper lobe and lower lobe ground-glass opacities suggesting pulmonary contusions. No pneumothorax. No acute traumatic injury of the thoracic aorta. Upper abdominal ascites. CT Cervical Spine WO Contrast    Result Date: 3/28/2022  No acute traumatic injury of the cervical spine. Mild multilevel degenerative changes. Pleural effusions in the lung apices. XR CHEST PORTABLE    Result Date: 3/28/2022  1. Endotracheal tube terminates 2 cm above the matthew. Enteric tube terminates at the body of the stomach. 2.  Bilateral airspace disease, which may represent inflammatory process or multifocal infection. CTA ABDOMEN PELVIS W CONTRAST    Result Date: 3/31/2022  No acute traumatic injury of the abdomen or pelvis. Findings of hepatic cirrhosis with abdominal and pelvic ascites possibly related to the cirrhosis. Mildly prominent loops of bowel throughout the abdomen and pelvis with mucosal enhancement. This may be secondary to mild ileus or shock bowel. No evidence of bowel obstruction. No acute traumatic injury of the aorta. No active extravasation of contrast. See separate CT of the chest for discussion of those findings. XR ABDOMEN FOR NG/OG/NE TUBE PLACEMENT    Result Date: 3/29/2022  Enteric tube looped within the body of the stomach with the tip directed superiorly towards the fundus of the stomach. XR ABDOMEN FOR NG/OG/NE TUBE PLACEMENT    Result Date: 3/29/2022  Enteric tube terminating proximal to the GE junction. This should be advanced approximately 10 cm, and reimaged.      XR ABDOMEN FOR NG/OG/NE TUBE PLACEMENT    Result Date: 3/29/2022  No change in position of the tip of endotracheal tube. If this has been advanced., Question it may be coiled off the field of view of the radiograph. .  If warranted, consider chest x-ray or soft tissue neck imaging. Otherwise, consider advancement 10-15 cm. The findings were sent to the Radiology Results [address] at 1:09 am on 3/29/2022to be communicated to a licensed caregiver. XR ABDOMEN FOR NG/OG/NE TUBE PLACEMENT    Result Date: 3/28/2022  Enteric tube terminates the level of the GE junction. This needs advanced least 10 cm. The findings were sent to the Radiology Results [address] at 8:49 pm on 3/28/2022to be communicated to a licensed caregiver. US GUIDED PARACENTESIS    Result Date: 3/30/2022  Successful ultrasound-guided paracentesis. MRI BRAIN WO CONTRAST    Result Date: 3/31/2022  Abnormal signal and restricted diffusion within the cerebral hemispheres, including the basal ganglia and thalamus, as well as the cerebellar hemispheres bilaterally as described above consistent with anoxic/hypoxic injury. US RETROPERITONEAL LIMITED    Result Date: 3/30/2022  Small nonobstructing right renal calculus. Otherwise unremarkable renal ultrasound. No hydronephrosis.        Assessment :      Hospital Problems           Last Modified POA    * (Principal) Cardiac arrest ([address] 3/28/2022 Yes    Acute blood loss anemia 3/28/2022 Yes    Hematemesis 1/10/1697 Yes    Alcoholic cirrhosis of liver with ascites ([address] 3/28/2022 Yes    Acute kidney injury (ROMAN) with acute tubular necrosis (ATN) ([address] 9/98/2060 Yes    Metabolic alkalosis 7/75/4720 Yes    Metabolic acidosis 4/41/7955 Yes    ACP (advance care planning) 3/31/2022 Yes    Goals of care, counseling/discussion 3/31/2022 Yes    Encounter for palliative care 3/31/2022 Yes          Plan:     S/P terminal extubation  Palliative measures including Ativan and Morphine are in place  Oxygen for symptomatic relief  Palliative care following  Discussed with brother at bedside    Consultations:   IP CONSULT TO CRITICAL CARE  IP CONSULT TO CARDIOLOGY  IP CONSULT TO GI  IP CONSULT TO PALLIATIVE CARE  IP CONSULT TO [facility]  IP CONSULT TO OTOLARYNGOLOGY  IP CONSULT TO NEUROCRITICAL CARE  IP CONSULT TO NEPHROLOGY  IP CONSULT TO HOSPICE  IP CONSULT TO HOSPITALIST      Kenna Rodriguez MD  4/1/2022  7:51 AM    Copy sent to Dr. Teto Nicholson

## 2022-04-01 NOTE — DISCHARGE SUMMARY
[facility]  Office: [address], DO, Mendy Bailey, DO, Jaspal Shown, DO, [employer], DO, Kannan Roberts MD, Kiko Brown MD, Brennan Pedro MD, Nakia Weston MD, Donna Mcgee MD, John Paul Lacy MD, Lotus Trejo MD, Terri Tracy, DO, Edgard Maki, DO, Bella Tinoco MD,  Jose A Jimenes, DO, Lucita Crocker MD, Melvern Essex, MD, Brandt Badillo MD, Rufino Kapadia, DO, Sandee Perea MD, Myron Cordoba MD, Katty Garcia [facility], [facility], CNP, Allison Holder, CNP, Cece Callahan, CNS, Chucky Peters, CNP, Isabella Lobo, CNP, Janice Beltran, CNP, Sierra Hutchison, CNP, Gracie Doss PA-C, Sudhir Jaimes [facility], Ned Almonte, CNP, Gita Schultz, CNP, Major Mclain, [facility], Ranjit Velasco, CNP, Leana Savage, CNP, Tsering Hubbard, [facility]    Discharge Summary     Patient ID: Dick Hollingsworth  :  1955   MRN: [id number]     ACCOUNT:  [de-identified]   Patient's PCP: Abner Fowler  Admit Date: 3/28/2022   Discharge Date: 2022     Length of Stay: 4  Code Status:  DNR-CC  Admitting Physician: No admitting provider for patient encounter. Discharge Physician: Nakia Weston MD     Active Discharge Diagnoses:     Hospital Problem Lists:  Principal Problem:    Cardiac arrest [facility])  Active Problems:    Acute blood loss anemia    Hematemesis    Alcoholic cirrhosis of liver with ascites ([facility] [address]    Acute kidney injury ([facility]AN) with acute tubular necrosis (ATN) (HCC)    Metabolic alkalosis    Metabolic acidosis    ACP (advance care planning)    Goals of care, counseling/discussion    Encounter for palliative care  Resolved Problems:    * No resolved hospital problems.  *      Admission Condition:  critical     Discharged Condition:     Hospital Stay:     Hospital Course:     Per critical care     64-year-old male with a history of alcohol use, hypertension presented to the emergency room by EMS as a postarrest.  Patient had an unknown downtime and was found prone on the ground.  No bystander CPR was done.  EMS performed CPR for about 40 minutes, patient was initially found to be in PEA arrest.  An eye gel was used to establish airway.  He was given 4 rounds of epinephrine during CPR and had ROSC on arrival.  Patient then became bradycardic and CPR is in seconds initiated, ROSC was achieved after 1 round and 1 mg of epi.  Patient was started on epinephrine drip and intubated. Asim Eaton reportedly had hematemesis before intubation as well as brown/pink fluid return with his OG.  He once again lost pulses and wanted to rest.  CPR was initiated and amp of bicarb was given and ROSC was obtained.  Patient's hemoglobin was found to be 5.6.  Imaging showed bilateral airspace disease, pleural effusions in the apices of the lungs, traumatic nondisplaced rib fractures, traumatic nondisplaced sternal fracture, substernal hematoma with active extravasation resting arterial bleed.      3/28: Epi drip was discontinued, dopamine drip started, sedation discontinued, arterial line placed in the right radial artery, patient placed on bicarb drip.  Bedside EGD was done and showed active retropharyngeal bleeding, ENT was consulted.  Octreotide drip, Protonix drip were discontinued.      3/29: 1 Unit PRBC overnight, No colonoscopy per GI instead repeat CT abdomen pelvis in AM     3/30: Started on lasix drip, IR did bedside paracentesis  3/31: anoxic injury on MRI. Discussed with family.  Terminally extubated.   Patient transferred to the floor after terminal extubation code status is now [facility]     Consults: Neuro crit care, palliative care, CT surgery, ENT, GI    4/1 : Patient was examined and the following were absent: Respiratory effort     I declared the patient dead on 4/1/2022 at 2:50 PM  Preliminary cause is cardiac arrest    Significant therapeutic interventions: as above    Significant Diagnostic Studies:   Labs / Micro:  CBC:   Lab Results   Component Value Date    WBC 9.4 03/31/2022    RBC 2.95 03/31/2022    HGB 7.6 03/31/2022    HCT 22.9 03/31/2022    MCV 77.6 03/31/2022    MCH 25.8 03/31/2022    MCHC 33.2 03/31/2022    RDW 21.2 03/31/2022    PLT 81 03/31/2022     BMP:    Lab Results   Component Value Date    GLUCOSE 117 03/31/2022     03/31/2022    K 3.5 03/31/2022     03/31/2022    CO2 24 03/31/2022    ANIONGAP 13 03/31/2022    BUN 34 03/31/2022    CREATININE 4.09 03/31/2022    CALCIUM 7.2 03/31/2022    LABGLOM 15 03/31/2022    GFRAA 18 03/31/2022    GFR      03/31/2022     HFP:    Lab Results   Component Value Date    PROT 5.2 03/31/2022     CMP:    Lab Results   Component Value Date    GLUCOSE 117 03/31/2022     03/31/2022    K 3.5 03/31/2022     03/31/2022    CO2 24 03/31/2022    BUN 34 03/31/2022    CREATININE 4.09 03/31/2022    ANIONGAP 13 03/31/2022    ALKPHOS 58 03/31/2022    ALT 33 03/31/2022    AST 92 03/31/2022    BILITOT 1.47 03/31/2022    LABALBU 2.3 03/31/2022    ALBUMIN 0.8 03/31/2022    LABGLOM 15 03/31/2022    GFRAA 18 03/31/2022    GFR      03/31/2022    PROT 5.2 03/31/2022    CALCIUM 7.2 03/31/2022     PT/INR:    Lab Results   Component Value Date    PROTIME 20.0 03/28/2022    INR 2.0 03/28/2022     PTT:   Lab Results   Component Value Date    APTT 50.9 03/28/2022     FLP:    Lab Results   Component Value Date    TRIG 50 03/30/2022     U/A:    Lab Results   Component Value Date    COLORU Dark Yellow 03/28/2022    TURBIDITY Cloudy 03/28/2022    SPECGRAV 1.022 03/28/2022    HGBUR MODERATE 03/28/2022    PHUR 5.5 03/28/2022    PROTEINU 2+ 03/28/2022    GLUCOSEU NEGATIVE 03/28/2022    KETUA NEGATIVE 03/28/2022    BILIRUBINUR NEGATIVE 03/28/2022    UROBILINOGEN Normal 03/28/2022    NITRU NEGATIVE 03/28/2022    LEUKOCYTESUR TRACE 03/28/2022     TSH:  No results found for: TSH     Radiology:  CT ABDOMEN PELVIS WO CONTRAST Additional Contrast? None    Result Date: 3/30/2022  1.  Cirrhosis, ascites and anasarca. 2. No bowel obstruction identified. 3. Mild distention of the colon which could indicate an ileus. 4. Worsened bilateral pleural effusions and a worsened mild-to-moderate pericardial effusion. CT Head WO Contrast    Result Date: 3/28/2022  No acute intracranial abnormality. CT CHEST WO CONTRAST    Result Date: 3/29/2022  1. Interval decrease in size of substernal hematoma. 2.  Increasing right pleural effusion, now moderate in size. Blood products in this area appear extrapleural.  Similar appearance of low-density left pleural effusion. 3.  Redemonstration of sternal and bilateral rib fractures with overlying subcutaneous edema. 4.  Cirrhotic liver morphology and ascites. CTA CHEST W WO CONTRAST    Result Date: 3/28/2022  Acute traumatic nondisplaced to minimally displaced fractures of the right anterior 3rd, 4th, 7th and 8th ribs, left anterior 2nd, 3rd, 4th, 5th, 6th and 7th ribs as well as the right anterior 4th costochondral cartilage. Acute traumatic nondisplaced fracture of the body of the sternum. Substernal hematoma with small focus of active extravasation of contrast suggesting arterial bleeding. Hematomas in the subpleural space deep to the right costochondral cartilage fractures and anterior rib fractures with foci of active extravasation of contrast suggesting active bleeding from the right internal thoracic artery. Bilateral pleural effusions. Right perihilar upper lobe and lower lobe ground-glass opacities suggesting pulmonary contusions. No pneumothorax. No acute traumatic injury of the thoracic aorta. Upper abdominal ascites. CT Cervical Spine WO Contrast    Result Date: 3/28/2022  No acute traumatic injury of the cervical spine. Mild multilevel degenerative changes. Pleural effusions in the lung apices. XR CHEST PORTABLE    Result Date: 3/28/2022  1. Endotracheal tube terminates 2 cm above the matthew. Enteric tube terminates at the body of the stomach.  2. Bilateral airspace disease, which may represent inflammatory process or multifocal infection. CTA ABDOMEN PELVIS W CONTRAST    Result Date: 3/31/2022  No acute traumatic injury of the abdomen or pelvis. Findings of hepatic cirrhosis with abdominal and pelvic ascites possibly related to the cirrhosis. Mildly prominent loops of bowel throughout the abdomen and pelvis with mucosal enhancement. This may be secondary to mild ileus or shock bowel. No evidence of bowel obstruction. No acute traumatic injury of the aorta. No active extravasation of contrast. See separate CT of the chest for discussion of those findings. XR ABDOMEN FOR NG/OG/NE TUBE PLACEMENT    Result Date: 3/29/2022  Enteric tube looped within the body of the stomach with the tip directed superiorly towards the fundus of the stomach. XR ABDOMEN FOR NG/OG/NE TUBE PLACEMENT    Result Date: 3/29/2022  Enteric tube terminating proximal to the GE junction. This should be advanced approximately 10 cm, and reimaged. XR ABDOMEN FOR NG/OG/NE TUBE PLACEMENT    Result Date: 3/29/2022  No change in position of the tip of endotracheal tube. If this has been advanced., Question it may be coiled off the field of view of the radiograph. .  If warranted, consider chest x-ray or soft tissue neck imaging. Otherwise, consider advancement 10-15 cm. The findings were sent to the Radiology Results [address] at 1:09 am on 3/29/2022to be communicated to a licensed caregiver. XR ABDOMEN FOR NG/OG/NE TUBE PLACEMENT    Result Date: 3/28/2022  Enteric tube terminates the level of the GE junction. This needs advanced least 10 cm. The findings were sent to the Radiology Results [address] at 8:49 pm on 3/28/2022to be communicated to a licensed caregiver. US GUIDED PARACENTESIS    Result Date: 3/30/2022  Successful ultrasound-guided paracentesis.      MRI BRAIN WO CONTRAST    Result Date: 3/31/2022  Abnormal signal and restricted diffusion within the cerebral hemispheres, including the basal ganglia and thalamus, as well as the cerebellar hemispheres bilaterally as described above consistent with anoxic/hypoxic injury. US RETROPERITONEAL LIMITED    Result Date: 3/30/2022  Small nonobstructing right renal calculus. Otherwise unremarkable renal ultrasound. No hydronephrosis. Consultations:    Consults:     Final Specialist Recommendations/Findings:   IP CONSULT TO CRITICAL CARE  IP CONSULT TO CARDIOLOGY  IP CONSULT TO GI  IP CONSULT TO PALLIATIVE CARE  IP CONSULT TO CARDIOTHORACIC SURGERY  IP CONSULT TO OTOLARYNGOLOGY  IP CONSULT TO NEUROCRITICAL CARE  IP CONSULT TO NEPHROLOGY  IP CONSULT TO HOSPICE  IP CONSULT TO HOSPITALIST      The patient was seen and examined on day of discharge and this discharge summary is in conjunction with any daily progress note from day of discharge. Discharge plan:     Disposition:       Electronically signed by   Kirstin Silva MD  2022  4:58 PM      Thank you Dr. Moira Goodwin for the opportunity to be involved in this patient's care.

## 2022-04-01 NOTE — FLOWSHEET NOTE
Pt. Extubated with sister Logan Arroyo and brother at bedside.  led prayer at bedside. Pt. Continued to breathe on his own and  offered companionship and courtesy cart. Cori Pool will continue to check in through out the evening. 03/31/22 1416   Encounter Summary   Services provided to: Patient and family together   Referral/Consult From: Multi-disciplinary team   Support System Family members   Continue Visiting   (03/31/2022)   Complexity of Encounter High   Length of Encounter 1 hour;45 minutes   Spiritual Assessment Completed Yes   Grief and Life Adjustment   Type End of life;Grief and loss   Assessment Calm; Approachable;Coping   Intervention Active listening;Explored feelings, thoughts, concerns;Explored coping resources;Nurtured hope;Prayer;Sustaining presence/ Ministry of presence;Grief care; End of life care; Discussed death;Discussed afterlife; Discussed meaning/purpose;Discussed relationship with God;Discussed belief system/Judaism practices/quintin;Discussed illness/injury and it's impact; Facilitated family conference   Outcome Comfort;Expressed gratitude;Expressed feelings/needs/concerns; Tearful;Grieving;Receptive

## 2022-04-01 NOTE — SIGNIFICANT EVENT
I had a long discussion with the patient's family in person, in presence of the RN taking care of the patient. Patient's current clinical condition, laboratory and radiographic findings as well as recommendations of physicians consulted on the case were discussed with the patient's family in detail. All questions and concerns of family were addressed, and appropriate emotional support was provided. After understanding patient's current medical condition, family decided that they did not want any lab draws or treatments aimed at prolonging the life of patient, at the cost of patient's overall comfort. All 7 siblings expressed their wishes to make the patient comfortable, stop all lab draws and not to do any resuscitative procedures on the patient. They requested patient's code status to be changed to [facility], and 2 siblings signed the [facility] order form in my presence, the other 5 were spoken to over the phone. This was witnessed by RN, Aleksandar Fong. Will honor family's wishes, and will change patient's code status to [facility].         Dusty Schmid MD  Department of Internal Medicine,  [facility]             3/31/2022 04:38PM

## 2022-04-01 NOTE — FLOWSHEET NOTE
SPIRITUAL CARE DEPARTMENT - [address]  PROGRESS NOTE    Shift date: 4/1/2022    Shift day: Friday   Shift # 1    Room # 3975/2810-44   Name: Eliane Orellana            Age: 78 y.o. Gender: male          Scientologist: Maye [address] of Gnosticist: [place]    Referral: End of life follow up    Admit Date & Time: 3/28/2022 10:29 AM    PATIENT/EVENT DESCRIPTION:  Eliane Orellana is a 78 y.o. male   (Description of condition/event). SPIRITUAL ASSESSMENT/INTERVENTION:  Assessment: Brother Iván Casas and his wife, Kristina Mcdaniel, were present. Iván Casas was on phone with patient's sister, Terris Canavan. Iván Casas was tearful at times. Intervention:  provided a listening and supportive presence and words of comfort. Outcome: Family reminisced and expressed emotions surrounded patient's anticipated death. Family expressed appreciation for support. SPIRITUAL CARE FOLLOW-UP PLAN:  Chaplains will await notice of patient's death and follow up as needed. 04/01/22 1022   Encounter Summary   Services provided to: Family   Referral/Consult From: [facility]il[facility] Angel Visiting   (4/1/22)   Complexity of Encounter Moderate   Length of Encounter 30 minutes   Grief and Life Adjustment   Type End of life   Assessment Calm; Approachable;Tearful; Anticipatory grief   Intervention Active listening;Explored feelings, thoughts, concerns   Outcome Comfort;Expressed gratitude;Engaged in conversation;Expressed feelings/needs/concerns; Shared reminiscences; Encouraged;Receptive         Electronically signed by Margorie Crigler, on 4/1/2022 at 10:24 AM.  [facility]  [phone]

## 2022-04-01 NOTE — DEATH NOTES
Death Pronouncement Note  Patient's Name: Addison Mckenzie   Patient's YOB: 1955  MRN Number: [id number]    Admitting Provider: No admitting provider for patient encounter.   Attending Provider: No att. providers found    Patient was examined and the following were absent: Respiratory effort    I declared the patient dead on 4/1/2022 at 2:50 PM    Preliminary Cause of Death:      Electronically signed by Emile Robertson MD on 4/1/22 at 4:57 PM EDT

## 2022-04-01 NOTE — FLOWSHEET NOTE
[facility] [address]   Patient Death Note  DEATH   Shift date: 2022    Shift day: Friday  Shift #: 1                 Room # 2906/2501-56   Name: Dejon Floresr            Age: 78 y.o. Gender: male          Mu-ism: [address] of Zoroastrianism:   Admit Date & Time: 3/28/2022 10:29 AM     Referral: ODALIS Stubbs   Actual date of death: 2022 1450       SITUATION AT DEATH:  Pt. Extubated yesterday and continued to breath. Today brother Leo Pak and sister in law Leigh Bolaños at bedside. Relieved that pt. Has finally \"let go\". IS THIS A 'S CASE? No    SPIRITUAL/EMOTIONAL INTERVENTION:   offered prayer and emotional support for family at bedside. Family tearful but coping.  assisted ODALIS Stubbs with completion of release of body form and she will alert  when pt. Is taken to morgue. Family Received Grief Packet? Yes       NAME AND PHONE NUMBER OF DOCTOR SIGNING DEATH CERTIFICATE:  Dr. Silviano Larose are now contacting the  home after a patient death.  will call  home once RN takes body to the morgue. Copy of COMPLETED Release of Body Form Received? Yes    Patient's belongings: None     HOME:  Name: Tank Garcia: [place]  Phone Number: [phone]    NEXT OF KIN:  Name: Antonino Marie  Relationship: Broth  Street Address: [address] [place] [address]. City: [place]: [place]  Zip code: [facility]   Phone Number: [phone]    ANY FOLLOW-UP NEEDED? No    IF SO, WHAT?   None    Electronically signed by Alesha Del Rosario on 2022 at 3:31 PM.  [facility]  [phone]     22 1529   Encounter Summary   Services provided to: Patient and family together   Referral/Consult From: Nurse   Support System Family members   Continue Visiting   (2022)   Complexity of Encounter High   Length of Encounter 1 hour;30 minutes   Spiritual Assessment Completed Yes   Grief and Life Adjustment   Type Death   Assessment Calm; Approachable;Tearful;Grieving   Intervention Active listening;Explored feelings, thoughts, concerns;Explored coping resources;Nurtured hope;Prayer;Sustaining presence/ Ministry of presence;Grief care; End of life care   Outcome Comfort;Expressed gratitude;Coping;Tearful;Grieving;Receptive

## 2022-04-01 NOTE — CARE COORDINATION
Transitional planning. Pt terminally extubated last night. Now DNR-CC. Follow needs. Hospice vs Palliative    1415 Hospice order noted. Went to pt's room, family not at bedside. Bedside RN stated they stepped out  And will return shortly. 1450 Bedside RN states pt has passed away. Family not contacted by CM.

## 2022-04-01 NOTE — PROGRESS NOTES
Renal Progress Note    Patient :  Trinidad Cornell; 78 y.o. MRN# [id number]  Location:  5871/2968-00  Attending:  Orestes Gomez MD  Admit Date:  3/28/2022   Hospital Day: 4      Subjective:     Patient was seen and examined. Patient's CODE STATUS was changed to [facility] yesterday and was extubated. Family at bedside. Comfort measures being followed. Outpatient Medications:     Medications Prior to Admission: metoprolol succinate (TOPROL XL) 50 MG extended release tablet, Take 200 mg by mouth daily     Current Medications:     Scheduled Meds:   Continuous Infusions:   PRN Meds:  morphine, LORazepam, glycopyrrolate, fentanNYL, [DISCONTINUED] ondansetron **OR** ondansetron    Input/Output:       I/O last 3 completed shifts: In: 1141.2 [I.V.:849.5; IV Piggyback:291.6]  Out: 1515 [Urine:1515]. Patient Vitals for the past 96 hrs (Last 3 readings):   Weight   22 0348 256 lb 13.4 oz (116.5 kg)   22 0408 260 lb 9.3 oz (118.2 kg)   22 0449 254 lb 3.1 oz (115.3 kg)       Vital Signs:   Temperature:  Temp: 97.5 °F (36.4 °C)  TMax:   Temp (24hrs), Av.7 °F (37.1 °C), Min:97.5 °F (36.4 °C), Max:99.2 °F (37.3 °C)    Respirations:  Resp: (!) 34  Pulse:   Pulse: 128  BP:    BP: (!) 88/54  BP Range: Systolic (62NTS), PIF:089 , Min:88 , ATJ:756       Diastolic (65TON), FRANCINE:06, Min:54, Max:74      Physical Examination:     General:  Sleeping comfortably, did not answer any questions. HEENT: Atraumatic, normocephalic, no throat congestion, moist mucosa. Eyes:   Eyes closed. Neck:   Supple  Chest:   Bilateral vesicular breath sounds, no rales or wheezes. Cardiac:  S1 S2 RR, no murmurs, gallops or rubs. Abdomen: Soft, non-tender, no masses or organomegaly, BS audible. :   No suprapubic or flank tenderness. Neuro:  Sleeping comfortably, did not answer any questions. SKIN:  No rashes, good skin turgor. Extremities:  Positive edema.     Labs:       Recent Labs     22  2324 22  0536 22  0422 WBC  --  9.9 9.4   RBC  --  2.73* 2.95*   HGB 7.3* 6.9* 7.6*   HCT 22.2* 20.4* 22.9*   MCV  --  74.7* 77.6*   MCH  --  25.3 25.8   MCHC  --  33.8 33.2   RDW  --  19.9* 21.2*   PLT  --  92* 81*   MPV  --  10.3 10.5      BMP:   Recent Labs     03/30/22  0536 03/30/22  1831 03/31/22  0422    141 137   K 3.1* 3.6* 3.5*    103 100   CO2 24 24 24   BUN 29* 33* 34*   CREATININE 3.23* 3.96* 4.09*   GLUCOSE 151* 110* 117*   CALCIUM 7.0* 7.2* 7.2*      Phosphorus:     Recent Labs     03/30/22  1831   PHOS 3.7     Magnesium:    Recent Labs     03/30/22  0536 03/30/22  1831 03/31/22  0422   MG 1.8 1.8 1.9     Albumin:    Recent Labs     03/30/22  0536 03/31/22  0422   LABALBU 2.1* 2.3*     SPEP:  Lab Results   Component Value Date    PROT 5.2 03/31/2022     Urinalysis/Chemistries:      Lab Results   Component Value Date    NITRU NEGATIVE 03/28/2022    COLORU Dark Yellow 03/28/2022    PHUR 5.5 03/28/2022    WBCUA 10 TO 20 03/28/2022    RBCUA 2 TO 5 03/28/2022    SPECGRAV 1.022 03/28/2022    LEUKOCYTESUR TRACE 03/28/2022    UROBILINOGEN Normal 03/28/2022    BILIRUBINUR NEGATIVE 03/28/2022    GLUCOSEU NEGATIVE 03/28/2022    [address] NEGATIVE 03/28/2022     Radiology:     Reviewed. Assessment:     1. Acute kidney injury nonoliguric secondary to ATN status post cardiac arrest, hypotension and contrast exposure. Baseline creatinine normal.  2.  Status post cardiac arrest.  3.  Circulatory shock required pressor support. 4.  Acute hypoxic respiratory failure requiring mechanical ventilation. 5.  Anoxic encephalopathy. 6.  Cirrhosis of liver. 7.  Morbid obesity. 8.  [address]:   1. Family did decide to make patient [facility] and comfort measures in place. 2.  Will respect family's wishes and nephrology will sign off. Please call with any questions. Nutrition   Please ensure that patient is on a renal diet/TF. Avoid nephrotoxic drugs/contrast exposure. We will continue to follow along with you.      Paras Yuki Maciel MD  [facility]     This note is created with the assistance of a speech-recognition program. While intending to generate a document that actually reflects the content of the visit, no guarantees can be provided that every mistake has been identified and corrected by editing.

## 2022-04-06 LAB
CULTURE: ABNORMAL
DIRECT EXAM: ABNORMAL
SPECIMEN DESCRIPTION: ABNORMAL

## 2023-09-14 NOTE — BRIEF OP NOTE
Brief Postoperative Note for Paracentesis    Patti Parker  YOB: 1955  [id number]    Pre-operative Diagnosis:  Ascites     Post-operative Diagnosis: Same    Procedure: Ultrasound guided Paracentesis     Anesthesia: 1% Lidocaine     Surgeons/Assistants: Ethan Cuenca PA-C    Complications: none    EBL: Minimal    Specimens: Were obtained    Ultrasound guided paracentesis performed. 3300 ml clear yellow fluid obtained. Dressing applied.      Electronically signed by JAVED Duran on 3/30/2022 at 3:28 PM Order entered.

## 2023-11-17 NOTE — PROGRESS NOTES
Patient extubated per physician order. Patient extubated in usual fashion. Patient placed on  2 liters/min via nasal cannula.      Adrianna Jc RCP   5:00 PM none

## 2024-10-19 NOTE — FLOWSHEET NOTE
SPIRITUAL CARE DEPARTMENT - [address]  PROGRESS NOTE    Shift date: 3/30/2022  Shift day: Wednesday   Shift # 2    Room # 0119/0119-01   Name: Addison Mckenzie            Age: 78 y.o. Gender: male          Adventism:    Place of Baptism:     Referral: Routine Visit    Admit Date & Time: 3/28/2022 10:29 AM    PATIENT/EVENT DESCRIPTION:  Addison Mckenzie is a 78 y.o. male   (Description of condition/event). SPIRITUAL ASSESSMENT/INTERVENTION:  Anne Marx was getting off the elevator on 1st floors and [place] pt brother ask to speak with te . Bella Leal was tearful about brother.  allowed pt to vent about pt and family dynamics   sustained ministry of presence.  prayed. Pt brother was relieved. SPIRITUAL CARE FOLLOW-UP PLAN:  Chaplains will remain available to offer spiritual and emotional support as needed.       Electronically signed by Clint Hensley on 3/30/2022 at 10:12 PM.  [facility]  [phone]         03/30/22 1515   Encounter Summary   Services provided to: Family   Referral/Consult From: [facility] Family members   Continue Visiting   (3/30/22)   Complexity of Encounter Moderate   Length of Encounter 45 minutes   Spiritual Assessment Completed Yes   Routine   Type Follow up   Assessment Tearful   Intervention Prayer;Sustaining presence/ Ministry of presence;Empowerment   Outcome Acceptance;Expressed gratitude set-up required/supervision/verbal cues/nonverbal cues (demo/gestures)/2 person assist